# Patient Record
Sex: FEMALE | Race: WHITE | NOT HISPANIC OR LATINO | Employment: OTHER | URBAN - METROPOLITAN AREA
[De-identification: names, ages, dates, MRNs, and addresses within clinical notes are randomized per-mention and may not be internally consistent; named-entity substitution may affect disease eponyms.]

---

## 2019-09-11 ENCOUNTER — TRANSCRIBE ORDERS (OUTPATIENT)
Dept: ADMINISTRATIVE | Facility: HOSPITAL | Age: 74
End: 2019-09-11

## 2019-09-11 DIAGNOSIS — R94.39 ABNORMAL STRESS TEST: Primary | ICD-10-CM

## 2019-09-16 ENCOUNTER — HOSPITAL ENCOUNTER (OUTPATIENT)
Facility: HOSPITAL | Age: 74
Discharge: HOME | End: 2019-09-16
Attending: INTERNAL MEDICINE | Admitting: INTERNAL MEDICINE

## 2019-09-16 VITALS
BODY MASS INDEX: 30.55 KG/M2 | OXYGEN SATURATION: 92 % | RESPIRATION RATE: 16 BRPM | TEMPERATURE: 97.1 F | HEIGHT: 63 IN | WEIGHT: 172.4 LBS | HEART RATE: 75 BPM | SYSTOLIC BLOOD PRESSURE: 129 MMHG | DIASTOLIC BLOOD PRESSURE: 85 MMHG

## 2019-09-16 DIAGNOSIS — R94.39 ABNORMAL STRESS TEST: ICD-10-CM

## 2019-09-16 LAB
ANION GAP SERPL CALCULATED.3IONS-SCNC: 12 MMOL/L (ref 5–15)
BUN BLD-MCNC: 13 MG/DL (ref 8–23)
BUN/CREAT SERPL: 15.9 (ref 7–25)
CALCIUM SPEC-SCNC: 9.9 MG/DL (ref 8.6–10.5)
CHLORIDE SERPL-SCNC: 103 MMOL/L (ref 98–107)
CO2 SERPL-SCNC: 25 MMOL/L (ref 22–29)
CREAT BLD-MCNC: 0.82 MG/DL (ref 0.57–1)
DEPRECATED RDW RBC AUTO: 45.3 FL (ref 37–54)
ERYTHROCYTE [DISTWIDTH] IN BLOOD BY AUTOMATED COUNT: 13 % (ref 12.3–15.4)
GFR SERPL CREATININE-BSD FRML MDRD: 68 ML/MIN/1.73
GLUCOSE BLD-MCNC: 117 MG/DL (ref 65–99)
HCT VFR BLD AUTO: 40.8 % (ref 34–46.6)
HGB BLD-MCNC: 13.1 G/DL (ref 12–15.9)
MCH RBC QN AUTO: 31.2 PG (ref 26.6–33)
MCHC RBC AUTO-ENTMCNC: 32.1 G/DL (ref 31.5–35.7)
MCV RBC AUTO: 97.1 FL (ref 79–97)
PLATELET # BLD AUTO: 225 10*3/MM3 (ref 140–450)
PMV BLD AUTO: 11.8 FL (ref 6–12)
POTASSIUM BLD-SCNC: 4.3 MMOL/L (ref 3.5–5.2)
RBC # BLD AUTO: 4.2 10*6/MM3 (ref 3.77–5.28)
SODIUM BLD-SCNC: 140 MMOL/L (ref 136–145)
WBC NRBC COR # BLD: 8.51 10*3/MM3 (ref 3.4–10.8)

## 2019-09-16 PROCEDURE — C1769 GUIDE WIRE: HCPCS | Performed by: INTERNAL MEDICINE

## 2019-09-16 PROCEDURE — 80048 BASIC METABOLIC PNL TOTAL CA: CPT

## 2019-09-16 PROCEDURE — 0 IOPAMIDOL PER 1 ML: Performed by: INTERNAL MEDICINE

## 2019-09-16 PROCEDURE — 85027 COMPLETE CBC AUTOMATED: CPT

## 2019-09-16 PROCEDURE — 25010000002 HEPARIN (PORCINE) PER 1000 UNITS: Performed by: INTERNAL MEDICINE

## 2019-09-16 PROCEDURE — 25010000002 MIDAZOLAM PER 1 MG: Performed by: INTERNAL MEDICINE

## 2019-09-16 PROCEDURE — 85014 HEMATOCRIT: CPT

## 2019-09-16 PROCEDURE — A9270 NON-COVERED ITEM OR SERVICE: HCPCS | Performed by: PHYSICIAN ASSISTANT

## 2019-09-16 PROCEDURE — 93459 L HRT ART/GRFT ANGIO: CPT | Performed by: INTERNAL MEDICINE

## 2019-09-16 PROCEDURE — 80047 BASIC METABLC PNL IONIZED CA: CPT

## 2019-09-16 PROCEDURE — 36415 COLL VENOUS BLD VENIPUNCTURE: CPT

## 2019-09-16 PROCEDURE — C1894 INTRO/SHEATH, NON-LASER: HCPCS | Performed by: INTERNAL MEDICINE

## 2019-09-16 PROCEDURE — 63710000001 ASPIRIN EC 325 MG TABLET DELAYED-RELEASE: Performed by: PHYSICIAN ASSISTANT

## 2019-09-16 PROCEDURE — 25010000002 FENTANYL CITRATE (PF) 100 MCG/2ML SOLUTION: Performed by: INTERNAL MEDICINE

## 2019-09-16 RX ORDER — LIDOCAINE HYDROCHLORIDE 10 MG/ML
INJECTION, SOLUTION EPIDURAL; INFILTRATION; INTRACAUDAL; PERINEURAL AS NEEDED
Status: DISCONTINUED | OUTPATIENT
Start: 2019-09-16 | End: 2019-09-16 | Stop reason: HOSPADM

## 2019-09-16 RX ORDER — ACETAMINOPHEN 325 MG/1
650 TABLET ORAL EVERY 4 HOURS PRN
Status: DISCONTINUED | OUTPATIENT
Start: 2019-09-16 | End: 2019-09-16 | Stop reason: HOSPADM

## 2019-09-16 RX ORDER — SPIRONOLACTONE 25 MG/1
25 TABLET ORAL DAILY
Qty: 30 TABLET | Refills: 11 | Status: SHIPPED | OUTPATIENT
Start: 2019-09-16

## 2019-09-16 RX ORDER — ASPIRIN 325 MG
325 TABLET, DELAYED RELEASE (ENTERIC COATED) ORAL DAILY
Status: DISCONTINUED | OUTPATIENT
Start: 2019-09-16 | End: 2019-09-16 | Stop reason: HOSPADM

## 2019-09-16 RX ORDER — ALPRAZOLAM 0.25 MG/1
0.25 TABLET ORAL 3 TIMES DAILY PRN
Status: DISCONTINUED | OUTPATIENT
Start: 2019-09-16 | End: 2019-09-16 | Stop reason: HOSPADM

## 2019-09-16 RX ORDER — FENTANYL CITRATE 50 UG/ML
INJECTION, SOLUTION INTRAMUSCULAR; INTRAVENOUS AS NEEDED
Status: DISCONTINUED | OUTPATIENT
Start: 2019-09-16 | End: 2019-09-16 | Stop reason: HOSPADM

## 2019-09-16 RX ORDER — HYDROCODONE BITARTRATE AND ACETAMINOPHEN 5; 325 MG/1; MG/1
1 TABLET ORAL EVERY 4 HOURS PRN
Status: DISCONTINUED | OUTPATIENT
Start: 2019-09-16 | End: 2019-09-16 | Stop reason: HOSPADM

## 2019-09-16 RX ORDER — OXYBUTYNIN CHLORIDE 5 MG/1
5 TABLET ORAL DAILY
COMMUNITY
End: 2020-02-17

## 2019-09-16 RX ORDER — MORPHINE SULFATE 2 MG/ML
1 INJECTION, SOLUTION INTRAMUSCULAR; INTRAVENOUS EVERY 4 HOURS PRN
Status: DISCONTINUED | OUTPATIENT
Start: 2019-09-16 | End: 2019-09-16 | Stop reason: HOSPADM

## 2019-09-16 RX ORDER — CARVEDILOL 3.12 MG/1
3.12 TABLET ORAL 2 TIMES DAILY
Qty: 180 TABLET | Refills: 3 | Status: SHIPPED | OUTPATIENT
Start: 2019-09-16

## 2019-09-16 RX ORDER — MIDAZOLAM HYDROCHLORIDE 1 MG/ML
INJECTION INTRAMUSCULAR; INTRAVENOUS AS NEEDED
Status: DISCONTINUED | OUTPATIENT
Start: 2019-09-16 | End: 2019-09-16 | Stop reason: HOSPADM

## 2019-09-16 RX ORDER — NALOXONE HCL 0.4 MG/ML
0.4 VIAL (ML) INJECTION
Status: DISCONTINUED | OUTPATIENT
Start: 2019-09-16 | End: 2019-09-16 | Stop reason: HOSPADM

## 2019-09-16 RX ORDER — TEMAZEPAM 7.5 MG/1
7.5 CAPSULE ORAL NIGHTLY PRN
Status: DISCONTINUED | OUTPATIENT
Start: 2019-09-16 | End: 2019-09-16 | Stop reason: HOSPADM

## 2019-09-16 RX ORDER — SODIUM CHLORIDE 9 MG/ML
250 INJECTION, SOLUTION INTRAVENOUS CONTINUOUS
Status: ACTIVE | OUTPATIENT
Start: 2019-09-16 | End: 2019-09-16

## 2019-09-16 RX ADMIN — ASPIRIN 325 MG: 325 TABLET, DELAYED RELEASE ORAL at 14:22

## 2019-09-16 NOTE — H&P
Pre-Cardiac Catheterization Report  Cardiovascular Laboratory        Patient:  Alicia Young  :  1945  PCP:  Shamir Morales MD  PHONE:  [phone]    DATE: 2019    BRIEF HPI:  Alicia Young is a 73 y.o. female with hypertension, hypercholesterolemia, known coronary artery disease.  She is post previous three-vessel CABG from .  She is complaining of a one-month history of chest pain which she describes a tightness.  He states it is moderate severity lasting minutes with radiation to her jaw.  Associated symptoms include palpitations, dizziness, and fatigue.  Her symptoms increased with stress and are decreased with rest and nitroglycerin.  She recently underwent an abnormal stress test and now presents for left heart catheterization with possible intervention.    Cardiac Risk Factors:  advanced age (older than 55 for men, 65 for women), dyslipidemia, family history of premature cardiovascular disease, hypertension, obesity (BMI >= 30 kg/m2), sedentary lifestyle    Anginal class in last 2 weeks:  CCS class III    CHF Class in last 2 weeks:  NYHA Class II    Cardiogenic shock:  no    Cardiac arrest <24 hours:  no    Stress test within last 6 months:   yes   Details:    Previous cardiac catheterization:  yes  Details:     Previous CABG:  yes  Details:      Allergies:     IV contrast allergy:  no  No Known Allergies    MEDICATIONS:  Prior to Admission medications    Medication Sig Start Date End Date Taking? Authorizing Provider   aspirin 81 MG chewable tablet Chew 81 mg Daily.    Emergency, Nurse CRISTINO Barrios   FLUoxetine (PROzac) 20 MG capsule Take 20 mg by mouth Daily.    Emergency, Nurse CRISTINO Barrios   HYDROcodone-acetaminophen (NORCO) 5-325 MG per tablet Take 1 tablet by mouth Every 4 (Four) Hours As Needed for pain. 18   Prvaeen Flowers MD   losartan (COZAAR) 50 MG tablet Take 50 mg by mouth Daily.    Emergency, Nurse CRISTINO Barrios   pantoprazole (PROTONIX) 40 MG  EC tablet Take 40 mg by mouth Daily.    Emergency, Nurse Epic, RN       Past medical & surgical history, social and family history reviewed in the electronic medical record.    ROS:  Cardiovascular ROS: positive for - chest pain, dyspnea on exertion, palpitations and shortness of breath    Physical Exam:    Vitals: There were no vitals filed for this visit. There were no vitals filed for this visit.    General Appearance:    Alert, cooperative, in no acute distress   Head:    Normocephalic, without obvious abnormality, atraumatic   Eyes:            Lids and lashes normal, conjunctivae and sclerae normal, no   icterus, no pallor, corneas clear, PERRLA   Ears:    Ears appear intact with no abnormalities noted   Neck:   No adenopathy, supple, trachea midline, no thyromegaly, +   carotid bruits, no JVD   Back:     No kyphosis present, no scoliosis present, range of motion normal   Lungs:     Clear to auscultation,respirations regular, even and                  unlabored    Heart:    Regular rhythm and normal rate, normal S1 and S2, 2/6            murmur, no gallop, no rub, no click   Chest Wall:    No abnormalities observed   Abdomen:     Normal bowel sounds, no masses, no organomegaly, soft        non-tender, non-distended, no guarding, no rebound                tenderness   Rectal:     Deferred   Extremities:   Moves all extremities well, no edema, no cyanosis, no             redness   Pulses:   Pulses palpable and equal bilaterally   Skin:   No bleeding, bruising or rash   Neurologic:   Cranial nerves 2 - 12 grossly intact, sensation intact     Barbaeu Test:  Left: Normal  (oxymetric Allens) Right: Not Assessed             No results found for: CHLPL, TRIG, HDL, LDLDIRECT, AST, ALT        Impression      · Abnormal stress test    Plan     · Procedure to perform: [facility]  · Planned access: Left radial artery              BRIGITTE West  09/16/19  12:13 PM

## 2019-09-17 LAB
BUN BLDA-MCNC: 13 MG/DL (ref 8–26)
CA-I BLDA-SCNC: 1.34 MMOL/L (ref 1.2–1.32)
CHLORIDE BLDA-SCNC: 103 MMOL/L (ref 98–109)
CO2 BLDA-SCNC: 28 MMOL/L (ref 24–29)
CREAT BLDA-MCNC: 0.9 MG/DL (ref 0.6–1.3)
GLUCOSE BLDC GLUCOMTR-MCNC: 114 MG/DL (ref 70–130)
HCT VFR BLDA CALC: 37 % (ref 38–51)
HGB BLDA-MCNC: 12.6 G/DL (ref 12–17)
POTASSIUM BLDA-SCNC: 4.1 MMOL/L (ref 3.5–4.9)
SODIUM BLDA-SCNC: 141 MMOL/L (ref 138–146)

## 2019-09-19 ENCOUNTER — TRANSCRIBE ORDERS (OUTPATIENT)
Dept: CARDIAC REHAB | Facility: HOSPITAL | Age: 74
End: 2019-09-19

## 2019-09-19 ENCOUNTER — DOCUMENTATION (OUTPATIENT)
Dept: CARDIAC REHAB | Facility: HOSPITAL | Age: 74
End: 2019-09-19

## 2019-09-19 DIAGNOSIS — Z00.00 PREVENTATIVE HEALTH CARE: Primary | ICD-10-CM

## 2019-09-19 NOTE — PROGRESS NOTES
Pt.'s daughter called in regards to Phase III Cardiac Rehab. Staff discussed benefits of exercise, program protocol, and educational material provided. Teach back verified.  Patient scheduled for orientation at [facility] on Tuesday, October 8th at 0900. Teach back verified.

## 2019-10-01 ENCOUNTER — TREATMENT (OUTPATIENT)
Dept: CARDIAC REHAB | Facility: HOSPITAL | Age: 74
End: 2019-10-01

## 2019-10-01 DIAGNOSIS — Z00.00 PREVENTATIVE HEALTH CARE: Primary | ICD-10-CM

## 2019-10-01 NOTE — PROGRESS NOTES
2Cardiac Rehab Initial Assessment      Name: Alicia Young  :1945 Allergies:Lipitor [atorvastatin]   MRN: [id number] 73 y.o. Physician: Shamir Morales MD   Primary Diagnosis:    Diagnosis Plan   1. Preventative health care      Event Date: 2019 Specialist: Ronny Hardin MD   Secondary Diagnosis: CAD, HTN, HLP Risk Stratification:High Risk Note Author: Deborah Fuentes RN     Cardiovascular History: Previous CABS     EXERCISE AT HOME  no  N/A    EF:  %      Source:            Ambulatory Status:Independent  Ambulatory Fall Risk Assessed on Initial Visit: yes 6 Minute Walk Pre- Cardiac Rehab:  Distance: ft      RPE:  Max. HR:        SPO2:    MET:   MPH:              RPD:   Resting BP:  LA,  RA    Peak BP:   Recovery BP:   Comments:       NUTRITION  Lipids:yes If yes, labs as follows;  Total: No components found for: CHOLESTEROL 212  HDL: No results found for: HDL Lipids continued:  LDL:No results found for: LDL  Triglyceride: No components found for: TRIGLYCERIDE   Weight Management:                 Weight:   Height:                                    BMI: There is no height or weight on file to calculate BMI.  Waist Circumference:   inches   Alcohol Use:  Diabetes:No    Last HGBA1C with date if applicable:No components found for: A1C         SOCIAL HISTORY  Social History     Socioeconomic History   • Marital status:      Spouse name: Not on file   • Number of children: Not on file   • Years of education: Not on file   • Highest education level: Not on file   Tobacco Use   • Smoking status: Former Smoker     Last attempt to quit: 1988     Years since quittin.7   • Smokeless tobacco: Never Used   Substance and Sexual Activity   • Alcohol use: No   • Drug use: No   • Sexual activity: Defer       Educational Level (choose one that applies) . Learning Barriers:Ready to Learn    Family Support:yes    Living Arrangement: lives with their family    Risk Factors: Clinical Depression  Yes  and Hyperlipidemia  Yes     Tobacco Adjunct: N/A        Comorbidities:      PSYCHOSOCIAL  Clinical Depression: yes    Stress: no     Assess presence or absence of depression using a valid screening tool: yes      PHYSICAL ASSESSMENT  Influenza vaccine: yes  Pneumococcal vaccine: yes          Angina: no    Describe angina scale of 0 - 4: 0 = none    Today are you having incisional pain? N/A. If, Yes, Scale: .        Today are you having any other pain? N/A. If, Yes, Scale: .     Diagnosed with Hypertension:yes    Heart Sounds:  S1 S2     Lung Sounds: normal air entry, lungs clear to auscultation         Assessment:  Orthopedic Problems: knee    Are you being hurt, hit, or frightened by anyone at home or in your life? no    Are you being neglected by a caregiver? N/A Shoulder flexibility/Range of motion: Average     Recommended arm activity: Any    Chair sit and reach within: 0 inches   Leg flexibility: Average    Leg Strength/Balance/Five times sit to stand: 0 seconds.     Chose one: Average    Recommended stretching: Standing    Assessment:     Family attends IA: no Time of arrival: 1300  Time of departure: 1400     Patient Goals: To increase strength and to eat healthier.          10/1/2019  2:30 PM  Deborah Fuentes RN

## 2019-10-03 ENCOUNTER — TREATMENT (OUTPATIENT)
Dept: CARDIAC REHAB | Facility: HOSPITAL | Age: 74
End: 2019-10-03

## 2019-10-03 DIAGNOSIS — Z00.00 PREVENTATIVE HEALTH CARE: Primary | ICD-10-CM

## 2019-10-08 ENCOUNTER — TREATMENT (OUTPATIENT)
Dept: CARDIAC REHAB | Facility: HOSPITAL | Age: 74
End: 2019-10-08

## 2019-10-08 ENCOUNTER — APPOINTMENT (OUTPATIENT)
Dept: CARDIAC REHAB | Facility: HOSPITAL | Age: 74
End: 2019-10-08

## 2019-10-08 DIAGNOSIS — Z00.00 PREVENTATIVE HEALTH CARE: Primary | ICD-10-CM

## 2019-10-10 ENCOUNTER — TREATMENT (OUTPATIENT)
Dept: CARDIAC REHAB | Facility: HOSPITAL | Age: 74
End: 2019-10-10

## 2019-10-10 DIAGNOSIS — Z00.00 PREVENTATIVE HEALTH CARE: Primary | ICD-10-CM

## 2019-10-15 ENCOUNTER — TREATMENT (OUTPATIENT)
Dept: CARDIAC REHAB | Facility: HOSPITAL | Age: 74
End: 2019-10-15

## 2019-10-15 DIAGNOSIS — Z00.00 PREVENTATIVE HEALTH CARE: Primary | ICD-10-CM

## 2019-10-17 ENCOUNTER — TREATMENT (OUTPATIENT)
Dept: CARDIAC REHAB | Facility: HOSPITAL | Age: 74
End: 2019-10-17

## 2019-10-17 DIAGNOSIS — Z00.00 PREVENTATIVE HEALTH CARE: Primary | ICD-10-CM

## 2019-10-22 ENCOUNTER — TREATMENT (OUTPATIENT)
Dept: CARDIAC REHAB | Facility: HOSPITAL | Age: 74
End: 2019-10-22

## 2019-10-22 DIAGNOSIS — Z00.00 PREVENTATIVE HEALTH CARE: Primary | ICD-10-CM

## 2019-10-24 ENCOUNTER — TREATMENT (OUTPATIENT)
Dept: CARDIAC REHAB | Facility: HOSPITAL | Age: 74
End: 2019-10-24

## 2019-10-24 DIAGNOSIS — Z00.00 PREVENTATIVE HEALTH CARE: Primary | ICD-10-CM

## 2019-10-29 ENCOUNTER — TREATMENT (OUTPATIENT)
Dept: CARDIAC REHAB | Facility: HOSPITAL | Age: 74
End: 2019-10-29

## 2019-10-29 DIAGNOSIS — Z00.00 PREVENTATIVE HEALTH CARE: Primary | ICD-10-CM

## 2019-10-31 ENCOUNTER — TREATMENT (OUTPATIENT)
Dept: CARDIAC REHAB | Facility: HOSPITAL | Age: 74
End: 2019-10-31

## 2019-10-31 DIAGNOSIS — Z00.00 PREVENTATIVE HEALTH CARE: Primary | ICD-10-CM

## 2019-11-05 ENCOUNTER — TREATMENT (OUTPATIENT)
Dept: CARDIAC REHAB | Facility: HOSPITAL | Age: 74
End: 2019-11-05

## 2019-11-05 DIAGNOSIS — Z00.00 PREVENTATIVE HEALTH CARE: Primary | ICD-10-CM

## 2019-11-06 ENCOUNTER — TREATMENT (OUTPATIENT)
Dept: CARDIAC REHAB | Facility: HOSPITAL | Age: 74
End: 2019-11-06

## 2019-11-06 DIAGNOSIS — Z00.00 PREVENTATIVE HEALTH CARE: Primary | ICD-10-CM

## 2019-11-06 NOTE — PROGRESS NOTES
"CARDIAC/PULMONARY REHAB NUTRITION EDUCATION/ASSESSMENT      73 y.o.         Height: 63.5  in    Weight: 163  lb     BMI: 28.4 IBW:  117.5lb     %IBW: 139              Time seen: 11:00a-12:00pm   Dx: cath with stent. H/o CAD, GERD, CABG 2011, HF  Cardiac Risk Factors: HTN, HLP Weight Assessment: Obese, based on IBW>130%             Appetite: good   Taste/smell changes:  No Food records reviewed? Yes                Who does the patient live with: alone  Who does the cooking at home:  pt    Patient actively receiving lifestyle support from others at home? Yes; dtr lives near       Pertinent Lab Values:   Total: No components found for: CHOLESTEROL  HDL: No results found for: HDL  LDL:No results found for: LDL  Triglyceride: No components found for: TRIGLYCERIDE  Last HGBA1C with date if applicable:No components found for: A1C  Glucose:   Glucose   Date Value Ref Range Status   09/16/2019 117 (H) 65 - 99 mg/dL Final                      Assessment / Recommendations: Rec that pt follow 1400 calories, and 39-54g total fat/d and no more than 8g saturated fat/d plan. RD encouraged wt management and advised pt that target wt of 130lb would be more in ideal wt range; pt plans to work toward wt loss.  Pt states she has not been cooking as much since she has been living alone, but when she does cook, pt describes many foods she makes/eats as \"Casseroles\" made with creamed soups/sauces etc. RD advised pt of high Na content of many of the foods she is currently using, and suggested food/product alternatives which would support compliance with HF diet guidelines. RD explained rationale for HF diet, and potential consequences of noncompliance. Pt expresses some verbal reluctance to making many of changes suggested by RD, and pt with someone negative attitude towards change at this point. RD assesses pt is pre-contemplation to contemplation stage of change; Pt with approp questions while she considers how/whether she may best move " "toward compliance. Pt able to grasp concepts taught. Pt states she has been taking metameucil recently, which she perceives helps minimize sx of GERD. Pt currently using many canned foods; RD recs [place] versions of canned vegetables. RD encouraged pt to increase fruit/vegetable consumption daily, and rec change from using butter to a vegetable-based spread. Pt states she \"loves salt.\" RD will continue edu with pt at grocery store tour in 2 weeks.  Motivation level toward diet compliance: strong       Education:    Instructed on:  Cardiac/mediterranean diet  Weight management  Lipid management  HF diet  7695-7907 mg/day Na restriction  Label reading for heart health  Written materials given:  yes         Plan: RD avail for f/u prn. Pt plans to attend grocery store tour with RD in November @ [employer].                 1:04 PM  11/6/2019  Yakelin Collins, MS,RD,LD                        "

## 2019-11-11 ENCOUNTER — TREATMENT (OUTPATIENT)
Dept: CARDIAC REHAB | Facility: HOSPITAL | Age: 74
End: 2019-11-11

## 2019-11-11 ENCOUNTER — OFFICE VISIT (OUTPATIENT)
Dept: ORTHOPEDIC SURGERY | Facility: CLINIC | Age: 74
End: 2019-11-11

## 2019-11-11 VITALS — OXYGEN SATURATION: 98 % | HEART RATE: 65 BPM | WEIGHT: 166 LBS | HEIGHT: 63 IN | BODY MASS INDEX: 29.41 KG/M2

## 2019-11-11 DIAGNOSIS — Z00.00 PREVENTATIVE HEALTH CARE: Primary | ICD-10-CM

## 2019-11-11 DIAGNOSIS — M17.12 PRIMARY OSTEOARTHRITIS OF LEFT KNEE: Primary | ICD-10-CM

## 2019-11-11 PROCEDURE — 99203 OFFICE O/P NEW LOW 30 MIN: CPT | Performed by: ORTHOPAEDIC SURGERY

## 2019-11-11 RX ORDER — PHENOL 1.4 %
600 AEROSOL, SPRAY (ML) MUCOUS MEMBRANE DAILY
COMMUNITY
End: 2020-02-17

## 2019-11-11 RX ORDER — MELOXICAM 7.5 MG/1
7.5 TABLET ORAL DAILY
COMMUNITY
End: 2020-05-04

## 2019-11-11 RX ORDER — ALPRAZOLAM 0.5 MG/1
0.5 TABLET ORAL 2 TIMES DAILY PRN
COMMUNITY
End: 2020-02-17

## 2019-11-11 NOTE — PROGRESS NOTES
[facility] [place] [facility] Note    Subjective     Chief Complaint   Patient presents with   • Left Knee - Pain        HPI    Alicia Young is a 74 y.o. female.  She presents today for evaluation of left knee pain.  Pain is been present for over 20 years, following no injury.  The pain is associated with swelling, popping, stiffness and giving way.  It worsens with walking, standing and climbing stairs.  The pain is 2 out of 10 today, but she had a steroid injection last week with Dr. Real.      Patient Active Problem List   Diagnosis   • Abnormal stress test     Past Medical History:   Diagnosis Date   • Arthritis    • Depression    • GERD (gastroesophageal reflux disease)    • Hyperlipidemia    • Hypertension    • Macular degeneration       Past Surgical History:   Procedure Laterality Date   • CARDIAC CATHETERIZATION     • CARDIAC CATHETERIZATION N/A 2019    Procedure: Left Heart Cath (61269);  Surgeon: Ronny Hardin MD;  Location: [facility] CATH INVASIVE LOCATION;  Service: Cardiovascular   • CHOLECYSTECTOMY     • CORONARY ARTERY BYPASS GRAFT        History reviewed. No pertinent family history.  Social History     Socioeconomic History   • Marital status:      Spouse name: Not on file   • Number of children: Not on file   • Years of education: Not on file   • Highest education level: Not on file   Tobacco Use   • Smoking status: Former Smoker     Last attempt to quit: 1988     Years since quittin.8   • Smokeless tobacco: Never Used   Substance and Sexual Activity   • Alcohol use: No   • Drug use: No   • Sexual activity: Defer      Current Outpatient Medications on File Prior to Visit   Medication Sig Dispense Refill   • ALPRAZolam (XANAX) 0.5 MG tablet Take 0.5 mg by mouth 2 (Two) Times a Day As Needed for Anxiety.     • aspirin 81 MG chewable tablet Chew 81 mg Daily.     • calcium carbonate (CALCIUM 600) 600 MG tablet Take 600 mg by mouth Daily.     • carvedilol (COREG)  "3.125 MG tablet Take 1 tablet by mouth 2 (Two) Times a Day. 180 tablet 3   • Evolocumab (REPATHA SC) Inject  under the skin into the appropriate area as directed.     • FLUoxetine (PROzac) 20 MG capsule Take 20 mg by mouth Daily.     • meloxicam (MOBIC) 7.5 MG tablet Take 7.5 mg by mouth Daily.     • Multiple Vitamins-Minerals (MULTIVITAMIN ADULT PO) Take  by mouth.     • Multiple Vitamins-Minerals (PRESERVISION AREDS 2 PO) Take  by mouth 2 (Two) Times a Day.     • NITROGLYCERIN PATCH      • oxybutynin (DITROPAN) 5 MG tablet Take 5 mg by mouth Daily.     • pantoprazole (PROTONIX) 40 MG EC tablet Take 40 mg by mouth Daily.     • sacubitril-valsartan (ENTRESTO) 24-26 MG tablet Take 1 tablet by mouth 2 (Two) Times a Day. 60 tablet 5   • spironolactone (ALDACTONE) 25 MG tablet Take 1 tablet by mouth Daily. 30 tablet 11     No current facility-administered medications on file prior to visit.       Allergies   Allergen Reactions   • Lipitor [Atorvastatin] Myalgia   • Ezetimibe Myalgia   • Statins Myalgia        Review of Systems   Constitutional: Positive for activity change and fatigue.   HENT: Positive for hearing loss, postnasal drip, rhinorrhea and sinus pressure.    Eyes: Positive for visual disturbance.   Respiratory: Negative.    Cardiovascular: Positive for chest pain.   Gastrointestinal: Positive for abdominal distention.   Endocrine: Negative.    Genitourinary: Positive for urgency.   Musculoskeletal: Positive for arthralgias, joint swelling, neck pain and neck stiffness.   Skin: Negative.    Allergic/Immunologic: Negative.    Neurological: Positive for dizziness.   Hematological: Negative.    Psychiatric/Behavioral: Positive for agitation, decreased concentration and sleep disturbance.        Objective      Physical Exam  Pulse 65   Ht 160 cm (62.99\")   Wt 75.3 kg (166 lb)   SpO2 98%   BMI 29.41 kg/m²     Body mass index is 29.41 kg/m².    General:   Mental Status:  Alert   Appearance: Cooperative, in no " acute distress   Build and Nutrition: Well-nourished well-developed female   Orientation: Alert and oriented to person, place and time   Posture: Normal   Gait: Normal    Integument:   Left knee: No skin lesions, no rash, no ecchymosis    Neurologic:   Sensation:    Left foot: Intact to light touch on the dorsal and plantar aspect   Motor:  Left lower extremity: 5/5 quadriceps, hamstrings, ankle dorsiflexors, and ankle plantar flexors  Vascular:   Left lower extremity: 2+ dorsalis pedis pulse, prompt capillary refill    Lower Extremities:   Left Knee:    Tenderness:  None    Effusion:  None    Swelling:  None    Crepitus:  Positive    Atrophy:  None    Range of motion:  Extension: 0°       Flexion: 130°  Instability:  No varus laxity, no valgus laxity, negative anterior drawer  Deformities:  None      Imaging/Studies      Imaging Results (Last 24 Hours)     Procedure Component Value Units Date/Time    XR Knee 4+ View Left [[id number]] Resulted:  11/11/19 1432     Updated:  11/11/19 1432    Narrative:       Left Knee Radiographs  Indication: left knee pain  Views: Standing AP's and skiers of both knees, with lateral and sunrise   views of the left knee    Comparison: no prior studies available    Findings:   Bone-on-bone contact the medial compartment, patellofemoral degeneration   primarily on the medial facet, varus alignment, and diffuse osteopenia.    Impression: Left knee osteoarthritis.          Assessment and Plan     Alicia was seen today for pain.    Diagnoses and all orders for this visit:    Primary osteoarthritis of left knee  -     XR Knee 4+ View Left        1. Primary osteoarthritis of left knee        I reviewed my findings with the patient today.  She does have left knee arthritis, and she just had an injection last week with Dr. Real.  She was interested in possible Visco supplementation injections.  Knee is doing well today, and I will see her back in 3 months, when she returns from [place].   We may consider injections at that time.  Long-term, she may be a candidate for knee replacement surgery depending on her symptoms.    Return in about 3 months (around 2/11/2020).      Medical Decision Making  Data/Risk: radiology tests and independent visualization of imaging, lab tests, or EMG/NCV      Heladio Arroyo MD  11/11/19  2:51 PM

## 2019-11-14 ENCOUNTER — TREATMENT (OUTPATIENT)
Dept: CARDIAC REHAB | Facility: HOSPITAL | Age: 74
End: 2019-11-14

## 2019-11-14 DIAGNOSIS — Z00.00 PREVENTATIVE HEALTH CARE: Primary | ICD-10-CM

## 2019-11-19 ENCOUNTER — TREATMENT (OUTPATIENT)
Dept: CARDIAC REHAB | Facility: HOSPITAL | Age: 74
End: 2019-11-19

## 2019-11-19 DIAGNOSIS — Z00.00 PREVENTATIVE HEALTH CARE: Primary | ICD-10-CM

## 2019-11-21 ENCOUNTER — TREATMENT (OUTPATIENT)
Dept: CARDIAC REHAB | Facility: HOSPITAL | Age: 74
End: 2019-11-21

## 2019-11-21 DIAGNOSIS — Z00.00 PREVENTATIVE HEALTH CARE: Primary | ICD-10-CM

## 2019-11-25 ENCOUNTER — TREATMENT (OUTPATIENT)
Dept: CARDIAC REHAB | Facility: HOSPITAL | Age: 74
End: 2019-11-25

## 2019-11-25 DIAGNOSIS — Z00.00 PREVENTATIVE HEALTH CARE: Primary | ICD-10-CM

## 2019-11-26 ENCOUNTER — TREATMENT (OUTPATIENT)
Dept: CARDIAC REHAB | Facility: HOSPITAL | Age: 74
End: 2019-11-26

## 2019-11-26 DIAGNOSIS — Z00.00 PREVENTATIVE HEALTH CARE: Primary | ICD-10-CM

## 2019-12-05 ENCOUNTER — APPOINTMENT (OUTPATIENT)
Dept: GENERAL RADIOLOGY | Facility: HOSPITAL | Age: 74
End: 2019-12-05

## 2019-12-05 ENCOUNTER — TREATMENT (OUTPATIENT)
Dept: CARDIAC REHAB | Facility: HOSPITAL | Age: 74
End: 2019-12-05

## 2019-12-05 ENCOUNTER — HOSPITAL ENCOUNTER (EMERGENCY)
Facility: HOSPITAL | Age: 74
Discharge: HOME | End: 2019-12-05
Attending: EMERGENCY MEDICINE | Admitting: EMERGENCY MEDICINE

## 2019-12-05 VITALS
OXYGEN SATURATION: 96 % | TEMPERATURE: 97.9 F | DIASTOLIC BLOOD PRESSURE: 70 MMHG | HEART RATE: 64 BPM | RESPIRATION RATE: 18 BRPM | WEIGHT: 160 LBS | SYSTOLIC BLOOD PRESSURE: 140 MMHG | BODY MASS INDEX: 28.35 KG/M2 | HEIGHT: 63 IN

## 2019-12-05 DIAGNOSIS — R53.1 GENERALIZED WEAKNESS: ICD-10-CM

## 2019-12-05 DIAGNOSIS — Z00.00 PREVENTATIVE HEALTH CARE: Primary | ICD-10-CM

## 2019-12-05 DIAGNOSIS — I95.2 HYPOTENSION DUE TO MEDICATION: Primary | ICD-10-CM

## 2019-12-05 LAB
ALBUMIN SERPL-MCNC: 4.1 G/DL (ref 3.5–5.2)
ALBUMIN/GLOB SERPL: 1.5 G/DL
ALP SERPL-CCNC: 72 U/L (ref 39–117)
ALT SERPL W P-5'-P-CCNC: 14 U/L (ref 1–33)
ANION GAP SERPL CALCULATED.3IONS-SCNC: 11 MMOL/L (ref 5–15)
AST SERPL-CCNC: 22 U/L (ref 1–32)
BASOPHILS # BLD AUTO: 0.03 10*3/MM3 (ref 0–0.2)
BASOPHILS NFR BLD AUTO: 0.5 % (ref 0–1.5)
BILIRUB SERPL-MCNC: 0.4 MG/DL (ref 0.2–1.2)
BILIRUB UR QL STRIP: NEGATIVE
BUN BLD-MCNC: 9 MG/DL (ref 8–23)
BUN/CREAT SERPL: 11.3 (ref 7–25)
CALCIUM SPEC-SCNC: 9.9 MG/DL (ref 8.6–10.5)
CHLORIDE SERPL-SCNC: 103 MMOL/L (ref 98–107)
CLARITY UR: CLEAR
CO2 SERPL-SCNC: 23 MMOL/L (ref 22–29)
COLOR UR: YELLOW
CREAT BLD-MCNC: 0.8 MG/DL (ref 0.57–1)
DEPRECATED RDW RBC AUTO: 46.1 FL (ref 37–54)
EOSINOPHIL # BLD AUTO: 0.35 10*3/MM3 (ref 0–0.4)
EOSINOPHIL NFR BLD AUTO: 5.6 % (ref 0.3–6.2)
ERYTHROCYTE [DISTWIDTH] IN BLOOD BY AUTOMATED COUNT: 12.9 % (ref 12.3–15.4)
GFR SERPL CREATININE-BSD FRML MDRD: 70 ML/MIN/1.73
GLOBULIN UR ELPH-MCNC: 2.8 GM/DL
GLUCOSE BLD-MCNC: 124 MG/DL (ref 65–99)
GLUCOSE UR STRIP-MCNC: NEGATIVE MG/DL
HCT VFR BLD AUTO: 41.1 % (ref 34–46.6)
HGB BLD-MCNC: 13.5 G/DL (ref 12–15.9)
HGB UR QL STRIP.AUTO: NEGATIVE
HOLD SPECIMEN: NORMAL
HOLD SPECIMEN: NORMAL
IMM GRANULOCYTES # BLD AUTO: 0.02 10*3/MM3 (ref 0–0.05)
IMM GRANULOCYTES NFR BLD AUTO: 0.3 % (ref 0–0.5)
KETONES UR QL STRIP: NEGATIVE
LEUKOCYTE ESTERASE UR QL STRIP.AUTO: NEGATIVE
LYMPHOCYTES # BLD AUTO: 1.78 10*3/MM3 (ref 0.7–3.1)
LYMPHOCYTES NFR BLD AUTO: 28.5 % (ref 19.6–45.3)
MAGNESIUM SERPL-MCNC: 2.1 MG/DL (ref 1.6–2.4)
MCH RBC QN AUTO: 32.2 PG (ref 26.6–33)
MCHC RBC AUTO-ENTMCNC: 32.8 G/DL (ref 31.5–35.7)
MCV RBC AUTO: 98.1 FL (ref 79–97)
MONOCYTES # BLD AUTO: 0.79 10*3/MM3 (ref 0.1–0.9)
MONOCYTES NFR BLD AUTO: 12.6 % (ref 5–12)
NEUTROPHILS # BLD AUTO: 3.28 10*3/MM3 (ref 1.7–7)
NEUTROPHILS NFR BLD AUTO: 52.5 % (ref 42.7–76)
NITRITE UR QL STRIP: NEGATIVE
NRBC BLD AUTO-RTO: 0 /100 WBC (ref 0–0.2)
PH UR STRIP.AUTO: 7 [PH] (ref 5–8)
PLATELET # BLD AUTO: 203 10*3/MM3 (ref 140–450)
PMV BLD AUTO: 10.9 FL (ref 6–12)
POTASSIUM BLD-SCNC: 4.2 MMOL/L (ref 3.5–5.2)
PROT SERPL-MCNC: 6.9 G/DL (ref 6–8.5)
PROT UR QL STRIP: NEGATIVE
RBC # BLD AUTO: 4.19 10*6/MM3 (ref 3.77–5.28)
SODIUM BLD-SCNC: 137 MMOL/L (ref 136–145)
SP GR UR STRIP: 1.01 (ref 1–1.03)
TROPONIN T SERPL-MCNC: <0.01 NG/ML (ref 0–0.03)
UROBILINOGEN UR QL STRIP: NORMAL
WBC NRBC COR # BLD: 6.25 10*3/MM3 (ref 3.4–10.8)
WHOLE BLOOD HOLD SPECIMEN: NORMAL
WHOLE BLOOD HOLD SPECIMEN: NORMAL

## 2019-12-05 PROCEDURE — 84484 ASSAY OF TROPONIN QUANT: CPT

## 2019-12-05 PROCEDURE — 81003 URINALYSIS AUTO W/O SCOPE: CPT

## 2019-12-05 PROCEDURE — 80053 COMPREHEN METABOLIC PANEL: CPT

## 2019-12-05 PROCEDURE — 83735 ASSAY OF MAGNESIUM: CPT

## 2019-12-05 PROCEDURE — 93005 ELECTROCARDIOGRAM TRACING: CPT | Performed by: EMERGENCY MEDICINE

## 2019-12-05 PROCEDURE — 71045 X-RAY EXAM CHEST 1 VIEW: CPT

## 2019-12-05 PROCEDURE — 85025 COMPLETE CBC W/AUTO DIFF WBC: CPT

## 2019-12-05 PROCEDURE — 99284 EMERGENCY DEPT VISIT MOD MDM: CPT

## 2019-12-05 PROCEDURE — 93005 ELECTROCARDIOGRAM TRACING: CPT

## 2019-12-05 RX ORDER — SODIUM CHLORIDE 0.9 % (FLUSH) 0.9 %
10 SYRINGE (ML) INJECTION AS NEEDED
Status: DISCONTINUED | OUTPATIENT
Start: 2019-12-05 | End: 2019-12-06 | Stop reason: HOSPADM

## 2019-12-06 NOTE — DISCHARGE INSTRUCTIONS
Discontinue carvedilol per Dr. Zee who is on-call for Dr. Hardin.    If you feel weak, lightheaded or otherwise feel poor please lie down and elevate your legs.  Check your blood pressure.    Please check your blood pressure 3 times a day. Keep a chart of these readings and any correlation to symptoms you might be having and bring this chart to the follow up with your primary care physician. If you don't already have a good blood pressure cuff, I recommend the following:    [employer] - LotFancy Blood Pressure Monitor (for upper arm)    or similar upper arm blood pressure cuffs which can be purchased for around $25.

## 2019-12-06 NOTE — ED PROVIDER NOTES
Subjective   Alicia Young is a 74 y.o. female who presents to the ED with c/o generalized weakness. The patient had a cardiac catheterization performed on 09/11/2019 performed by Dr. Hardin, cardiology. She was prescribed 3.125 mg of Carvedilol twice daily and 24-26 mg tablet of Entresto twice daily since 09/11/2019. Since then her blood pressure was running low in the 80's and 90's systolic but improved at some point then her blood pressure dropped again. 2 days ago the patient had a colonoscopy with normal results but after the procedure her blood pressure dropped to 67/37. At 1630 today she was performing normal activities when she felt weak, dizzy, and fatigued. The patient checked her blood pressure at this time and it was 75/56. She checked it again at 1700 and it was 99/56 but her heart rate dropped, prompting her to present to the ED. The patient reports having a normal intake of fluids. She has not taken her nightly dosages of Carvedilol or Entresto today. She has a past medical history of hypertension, hyperlipidemia, and GERD. Her surgical history includes CABG. There are no other acute complaints at this time.        History provided by:  Patient and relative  Weakness - Generalized   Severity:  Moderate  Onset quality:  Sudden  Duration:  4 hours  Timing:  Constant  Progression:  Improving  Chronicity:  Recurrent  Relieved by:  None tried  Worsened by:  Nothing  Ineffective treatments:  None tried  Associated symptoms: dizziness    Associated symptoms: no anorexia, no numbness in extremities, no falls, no loss of consciousness, no syncope and no vomiting    Risk factors: coronary artery disease and new medications    Risk factors: no congestive heart failure, no diabetes, no excessive menstruation and no family hx of stroke        Review of Systems   Constitutional: Positive for fatigue.        Patient has had normal fluid intake.   Cardiovascular: Negative for syncope.        The patient has had  lower blood pressure readings.   Gastrointestinal: Negative for anorexia and vomiting.   Musculoskeletal: Negative for falls.   Neurological: Positive for dizziness and weakness. Negative for loss of consciousness.   All other systems reviewed and are negative.      Past Medical History:   Diagnosis Date   • Arthritis    • Depression    • GERD (gastroesophageal reflux disease)    • Hyperlipidemia    • Hypertension    • Macular degeneration        Allergies   Allergen Reactions   • Lipitor [Atorvastatin] Myalgia   • Ezetimibe Myalgia   • Statins Myalgia       Past Surgical History:   Procedure Laterality Date   • CARDIAC CATHETERIZATION     • CARDIAC CATHETERIZATION N/A 2019    Procedure: Left Heart Cath (12300);  Surgeon: Ronny Hardin MD;  [facility];  Service: Cardiovascular   • CHOLECYSTECTOMY     • CORONARY ARTERY BYPASS GRAFT         No family history on file.    Social History     Socioeconomic History   • Marital status:      Spouse name: Not on file   • Number of children: Not on file   • Years of education: Not on file   • Highest education level: Not on file   Tobacco Use   • Smoking status: Former Smoker     Last attempt to quit: 1988     Years since quittin.9   • Smokeless tobacco: Never Used   Substance and Sexual Activity   • Alcohol use: No   • Drug use: No   • Sexual activity: Defer         Objective   Physical Exam   Constitutional: She is oriented to person, place, and time. She appears well-developed and well-nourished. No distress.   The patient is a pleasant female in no acute distress.   HENT:   Head: Normocephalic and atraumatic.   Eyes: Conjunctivae are normal. No scleral icterus.   Neck: Normal range of motion. Neck supple.   Cardiovascular: Normal rate, regular rhythm and normal heart sounds.   No murmur heard.  Pulmonary/Chest: Effort normal and breath sounds normal. No respiratory distress.   Abdominal: Soft. There is no  tenderness.   Musculoskeletal: Normal range of motion. She exhibits no edema.   Neurological: She is alert and oriented to person, place, and time.   Skin: Skin is warm and dry.   Psychiatric: She has a normal mood and affect. Her behavior is normal.   Nursing note and vitals reviewed.      Procedures         ED Course  ED Course as of Dec 05 2314   Thu Dec 05, 2019   2213 Dr. Callahan has paged Dr. Zee, cardiology, who is on-call for Dr. Hardin, cardiology.  [BS]   2220 Dr. Callahan has discussed the case in detail with Dr. Zee. The patient's medications were discussed in detail and Dr. Zee recommends stopping her Carvedilol medication and to schedule an office follow up.  [BS]      ED Course User Index  [BS] Mukesh Law       Recent Results (from the past 24 hour(s))   Comprehensive Metabolic Panel    Collection Time: 12/05/19  7:21 PM   Result Value Ref Range    Glucose 124 (H) 65 - 99 mg/dL    BUN 9 8 - 23 mg/dL    Creatinine 0.80 0.57 - 1.00 mg/dL    Sodium 137 136 - 145 mmol/L    Potassium 4.2 3.5 - 5.2 mmol/L    Chloride 103 98 - 107 mmol/L    CO2 23.0 22.0 - 29.0 mmol/L    Calcium 9.9 8.6 - 10.5 mg/dL    Total Protein 6.9 6.0 - 8.5 g/dL    Albumin 4.10 3.50 - 5.20 g/dL    ALT (SGPT) 14 1 - 33 U/L    AST (SGOT) 22 1 - 32 U/L    Alkaline Phosphatase 72 39 - 117 U/L    Total Bilirubin 0.4 0.2 - 1.2 mg/dL    eGFR Non African Amer 70 >60 mL/min/1.73    Globulin 2.8 gm/dL    A/G Ratio 1.5 g/dL    BUN/Creatinine Ratio 11.3 7.0 - 25.0    Anion Gap 11.0 5.0 - 15.0 mmol/L   Troponin    Collection Time: 12/05/19  7:21 PM   Result Value Ref Range    Troponin T <0.010 0.000 - 0.030 ng/mL   Magnesium    Collection Time: 12/05/19  7:21 PM   Result Value Ref Range    Magnesium 2.1 1.6 - 2.4 mg/dL   Light Blue Top    Collection Time: 12/05/19  7:21 PM   Result Value Ref Range    Extra Tube hold for add-on    Green Top (Gel)    Collection Time: 12/05/19  7:21 PM   Result Value Ref Range    Extra Tube Hold for  add-ons.    Lavender Top    Collection Time: 12/05/19  7:21 PM   Result Value Ref Range    Extra Tube hold for add-on    Gold Top - SST    Collection Time: 12/05/19  7:21 PM   Result Value Ref Range    Extra Tube Hold for add-ons.    CBC Auto Differential    Collection Time: 12/05/19  7:21 PM   Result Value Ref Range    WBC 6.25 3.40 - 10.80 10*3/mm3    RBC 4.19 3.77 - 5.28 10*6/mm3    Hemoglobin 13.5 12.0 - 15.9 g/dL    Hematocrit 41.1 34.0 - 46.6 %    MCV 98.1 (H) 79.0 - 97.0 fL    MCH 32.2 26.6 - 33.0 pg    MCHC 32.8 31.5 - 35.7 g/dL    RDW 12.9 12.3 - 15.4 %    RDW-SD 46.1 37.0 - 54.0 fl    MPV 10.9 6.0 - 12.0 fL    Platelets 203 140 - 450 10*3/mm3    Neutrophil % 52.5 42.7 - 76.0 %    Lymphocyte % 28.5 19.6 - 45.3 %    Monocyte % 12.6 (H) 5.0 - 12.0 %    Eosinophil % 5.6 0.3 - 6.2 %    Basophil % 0.5 0.0 - 1.5 %    Immature Grans % 0.3 0.0 - 0.5 %    Neutrophils, Absolute 3.28 1.70 - 7.00 10*3/mm3    Lymphocytes, Absolute 1.78 0.70 - 3.10 10*3/mm3    Monocytes, Absolute 0.79 0.10 - 0.90 10*3/mm3    Eosinophils, Absolute 0.35 0.00 - 0.40 10*3/mm3    Basophils, Absolute 0.03 0.00 - 0.20 10*3/mm3    Immature Grans, Absolute 0.02 0.00 - 0.05 10*3/mm3    nRBC 0.0 0.0 - 0.2 /100 WBC   Urinalysis With Microscopic If Indicated (No Culture) - Urine, Clean Catch    Collection Time: 12/05/19  7:37 PM   Result Value Ref Range    Color, UA Yellow Yellow, Straw    Appearance, UA Clear Clear    pH, UA 7.0 5.0 - 8.0    Specific Gravity, UA 1.006 1.001 - 1.030    Glucose, UA Negative Negative    Ketones, UA Negative Negative    Bilirubin, UA Negative Negative    Blood, UA Negative Negative    Protein, UA Negative Negative    Leuk Esterase, UA Negative Negative    Nitrite, UA Negative Negative    Urobilinogen, UA 0.2 E.U./dL 0.2 - 1.0 E.U./dL     Note: In addition to lab results from this visit, the labs listed above may include labs taken at another facility or during a different encounter within the last 24 hours. Please  correlate lab times with ED admission and discharge times for further clarification of the services performed during this visit.    XR Chest 1 View   Final Result   No acute cardiopulmonary findings.      Signer Name: Torey Kim MD    Signed: 12/5/2019 9:32 PM    Workstation Name: [id number]     [facility]        Vitals:    12/05/19 2145 12/05/19 2200 12/05/19 2215 12/05/19 2236   BP: 146/64 126/75 144/71 140/70   BP Location:       Patient Position:    Sitting   Pulse: 68 67 64 64   Resp:    18   Temp:    97.9 °F (36.6 °C)   TempSrc:    Oral   SpO2: 99% 98% 94% 96%   Weight:       Height:         Medications   sodium chloride 0.9 % flush 10 mL (not administered)     ECG/EMG Results (last 24 hours)     Procedure Component Value Units Date/Time    ECG 12 Lead [[id number]] Collected:  12/05/19 1936     Updated:  12/05/19 2057    Narrative:       Test Reason : Weak/Dizzy/AMS protocol  Blood Pressure : **/** mmHG  Vent. Rate : 071 BPM     Atrial Rate : 071 BPM     P-R Int : 148 ms          QRS Dur : 094 ms      QT Int : 442 ms       P-R-T Axes : 043 043 131 degrees     QTc Int : 480 ms    Sinus rhythm with premature supraventricular complexes and with frequent   premature ventricular complexes  Possible Left atrial enlargement  Inferior infarct , age undetermined  ST & T wave abnormality, consider lateral ischemia  Abnormal ECG  No previous ECGs available  Confirmed by MARK JAIMES MD ([id number]) on 12/5/2019 8:57:49 PM    Referred By:  EDMD           Confirmed By:MARK JAIMES MD        ECG 12 Lead   Final Result   Test Reason : Weak/Dizzy/AMS protocol   Blood Pressure : **/** mmHG   Vent. Rate : 071 BPM     Atrial Rate : 071 BPM      P-R Int : 148 ms          QRS Dur : 094 ms       QT Int : 442 ms       P-R-T Axes : 043 043 131 degrees      QTc Int : 480 ms      Sinus rhythm with premature supraventricular complexes and with frequent    premature ventricular complexes   Possible Left atrial  enlargement   Inferior infarct , age undetermined   ST & T wave abnormality, consider lateral ischemia   Abnormal ECG   No previous ECGs available   Confirmed by MARK CALLAHAN MD ([id number]) on 12/5/2019 8:57:49 PM      Referred By:  EDMD           Confirmed By:MARK CALLAHAN MD                        [facility]    Final diagnoses:   Hypotension due to medication   Generalized weakness       Documentation assistance provided by violette Law.  Information recorded by the scribe was done at my direction and has been verified and validated by me.     Mukesh Law  12/05/19 1262       Mark Callahan MD  12/08/19 9579

## 2019-12-09 ENCOUNTER — TREATMENT (OUTPATIENT)
Dept: CARDIAC REHAB | Facility: HOSPITAL | Age: 74
End: 2019-12-09

## 2019-12-09 DIAGNOSIS — Z00.00 PREVENTATIVE HEALTH CARE: Primary | ICD-10-CM

## 2019-12-13 ENCOUNTER — TREATMENT (OUTPATIENT)
Dept: CARDIAC REHAB | Facility: HOSPITAL | Age: 74
End: 2019-12-13

## 2019-12-13 DIAGNOSIS — Z00.00 PREVENTATIVE HEALTH CARE: Primary | ICD-10-CM

## 2019-12-18 ENCOUNTER — TREATMENT (OUTPATIENT)
Dept: CARDIAC REHAB | Facility: HOSPITAL | Age: 74
End: 2019-12-18

## 2019-12-18 DIAGNOSIS — Z00.00 PREVENTATIVE HEALTH CARE: Primary | ICD-10-CM

## 2019-12-20 ENCOUNTER — TREATMENT (OUTPATIENT)
Dept: CARDIAC REHAB | Facility: HOSPITAL | Age: 74
End: 2019-12-20

## 2019-12-20 DIAGNOSIS — Z00.00 PREVENTATIVE HEALTH CARE: Primary | ICD-10-CM

## 2019-12-26 ENCOUNTER — TREATMENT (OUTPATIENT)
Dept: CARDIAC REHAB | Facility: HOSPITAL | Age: 74
End: 2019-12-26

## 2019-12-26 DIAGNOSIS — Z00.00 PREVENTATIVE HEALTH CARE: Primary | ICD-10-CM

## 2019-12-27 ENCOUNTER — TREATMENT (OUTPATIENT)
Dept: CARDIAC REHAB | Facility: HOSPITAL | Age: 74
End: 2019-12-27

## 2019-12-27 DIAGNOSIS — Z00.00 PREVENTATIVE HEALTH CARE: Primary | ICD-10-CM

## 2019-12-30 ENCOUNTER — TREATMENT (OUTPATIENT)
Dept: CARDIAC REHAB | Facility: HOSPITAL | Age: 74
End: 2019-12-30

## 2019-12-30 DIAGNOSIS — Z00.00 PREVENTATIVE HEALTH CARE: Primary | ICD-10-CM

## 2020-01-08 ENCOUNTER — TREATMENT (OUTPATIENT)
Dept: CARDIAC REHAB | Facility: HOSPITAL | Age: 75
End: 2020-01-08

## 2020-01-08 DIAGNOSIS — Z00.00 PREVENTATIVE HEALTH CARE: Primary | ICD-10-CM

## 2020-01-10 ENCOUNTER — TREATMENT (OUTPATIENT)
Dept: CARDIAC REHAB | Facility: HOSPITAL | Age: 75
End: 2020-01-10

## 2020-01-10 DIAGNOSIS — Z00.00 PREVENTATIVE HEALTH CARE: Primary | ICD-10-CM

## 2020-01-14 ENCOUNTER — TREATMENT (OUTPATIENT)
Dept: CARDIAC REHAB | Facility: HOSPITAL | Age: 75
End: 2020-01-14

## 2020-01-14 DIAGNOSIS — Z00.00 PREVENTATIVE HEALTH CARE: Primary | ICD-10-CM

## 2020-01-16 ENCOUNTER — TREATMENT (OUTPATIENT)
Dept: CARDIAC REHAB | Facility: HOSPITAL | Age: 75
End: 2020-01-16

## 2020-01-16 DIAGNOSIS — Z00.00 PREVENTATIVE HEALTH CARE: Primary | ICD-10-CM

## 2020-01-21 ENCOUNTER — TREATMENT (OUTPATIENT)
Dept: CARDIAC REHAB | Facility: HOSPITAL | Age: 75
End: 2020-01-21

## 2020-01-21 DIAGNOSIS — Z00.00 PREVENTATIVE HEALTH CARE: Primary | ICD-10-CM

## 2020-01-23 ENCOUNTER — TREATMENT (OUTPATIENT)
Dept: CARDIAC REHAB | Facility: HOSPITAL | Age: 75
End: 2020-01-23

## 2020-01-23 DIAGNOSIS — Z00.00 PREVENTATIVE HEALTH CARE: Primary | ICD-10-CM

## 2020-02-11 ENCOUNTER — TREATMENT (OUTPATIENT)
Dept: CARDIAC REHAB | Facility: HOSPITAL | Age: 75
End: 2020-02-11

## 2020-02-11 DIAGNOSIS — Z00.00 PREVENTATIVE HEALTH CARE: Primary | ICD-10-CM

## 2020-02-14 ENCOUNTER — TREATMENT (OUTPATIENT)
Dept: CARDIAC REHAB | Facility: HOSPITAL | Age: 75
End: 2020-02-14

## 2020-02-14 DIAGNOSIS — Z00.00 PREVENTATIVE HEALTH CARE: Primary | ICD-10-CM

## 2020-02-15 ENCOUNTER — HOSPITAL ENCOUNTER (EMERGENCY)
Facility: HOSPITAL | Age: 75
Discharge: HOME | End: 2020-02-15
Attending: EMERGENCY MEDICINE | Admitting: EMERGENCY MEDICINE

## 2020-02-15 ENCOUNTER — APPOINTMENT (OUTPATIENT)
Dept: GENERAL RADIOLOGY | Facility: HOSPITAL | Age: 75
End: 2020-02-15

## 2020-02-15 VITALS
WEIGHT: 160 LBS | SYSTOLIC BLOOD PRESSURE: 162 MMHG | BODY MASS INDEX: 28.35 KG/M2 | RESPIRATION RATE: 18 BRPM | OXYGEN SATURATION: 98 % | HEART RATE: 65 BPM | DIASTOLIC BLOOD PRESSURE: 77 MMHG | TEMPERATURE: 97.6 F | HEIGHT: 63 IN

## 2020-02-15 DIAGNOSIS — I49.8 BIGEMINY: Primary | ICD-10-CM

## 2020-02-15 DIAGNOSIS — R53.1 GENERALIZED WEAKNESS: ICD-10-CM

## 2020-02-15 DIAGNOSIS — R42 DIZZINESS: ICD-10-CM

## 2020-02-15 LAB
ALBUMIN SERPL-MCNC: 4.2 G/DL (ref 3.5–5.2)
ALBUMIN/GLOB SERPL: 1.5 G/DL
ALP SERPL-CCNC: 74 U/L (ref 39–117)
ALT SERPL W P-5'-P-CCNC: 13 U/L (ref 1–33)
ANION GAP SERPL CALCULATED.3IONS-SCNC: 13 MMOL/L (ref 5–15)
AST SERPL-CCNC: 18 U/L (ref 1–32)
BASOPHILS # BLD AUTO: 0.02 10*3/MM3 (ref 0–0.2)
BASOPHILS NFR BLD AUTO: 0.4 % (ref 0–1.5)
BILIRUB SERPL-MCNC: 0.5 MG/DL (ref 0.2–1.2)
BUN BLD-MCNC: 7 MG/DL (ref 8–23)
BUN/CREAT SERPL: 9.1 (ref 7–25)
CALCIUM SPEC-SCNC: 9.3 MG/DL (ref 8.6–10.5)
CHLORIDE SERPL-SCNC: 99 MMOL/L (ref 98–107)
CO2 SERPL-SCNC: 24 MMOL/L (ref 22–29)
CREAT BLD-MCNC: 0.77 MG/DL (ref 0.57–1)
DEPRECATED RDW RBC AUTO: 45.7 FL (ref 37–54)
EOSINOPHIL # BLD AUTO: 0.2 10*3/MM3 (ref 0–0.4)
EOSINOPHIL NFR BLD AUTO: 3.6 % (ref 0.3–6.2)
ERYTHROCYTE [DISTWIDTH] IN BLOOD BY AUTOMATED COUNT: 12.5 % (ref 12.3–15.4)
GFR SERPL CREATININE-BSD FRML MDRD: 73 ML/MIN/1.73
GLOBULIN UR ELPH-MCNC: 2.8 GM/DL
GLUCOSE BLD-MCNC: 120 MG/DL (ref 65–99)
HCT VFR BLD AUTO: 39.4 % (ref 34–46.6)
HGB BLD-MCNC: 12.8 G/DL (ref 12–15.9)
HOLD SPECIMEN: NORMAL
HOLD SPECIMEN: NORMAL
IMM GRANULOCYTES # BLD AUTO: 0.01 10*3/MM3 (ref 0–0.05)
IMM GRANULOCYTES NFR BLD AUTO: 0.2 % (ref 0–0.5)
LYMPHOCYTES # BLD AUTO: 1.53 10*3/MM3 (ref 0.7–3.1)
LYMPHOCYTES NFR BLD AUTO: 27.5 % (ref 19.6–45.3)
MAGNESIUM SERPL-MCNC: 1.9 MG/DL (ref 1.6–2.4)
MCH RBC QN AUTO: 32 PG (ref 26.6–33)
MCHC RBC AUTO-ENTMCNC: 32.5 G/DL (ref 31.5–35.7)
MCV RBC AUTO: 98.5 FL (ref 79–97)
MONOCYTES # BLD AUTO: 0.65 10*3/MM3 (ref 0.1–0.9)
MONOCYTES NFR BLD AUTO: 11.7 % (ref 5–12)
NEUTROPHILS # BLD AUTO: 3.16 10*3/MM3 (ref 1.7–7)
NEUTROPHILS NFR BLD AUTO: 56.6 % (ref 42.7–76)
NRBC BLD AUTO-RTO: 0 /100 WBC (ref 0–0.2)
NT-PROBNP SERPL-MCNC: 1599 PG/ML (ref 5–900)
PLATELET # BLD AUTO: 188 10*3/MM3 (ref 140–450)
PMV BLD AUTO: 11.5 FL (ref 6–12)
POTASSIUM BLD-SCNC: 4.3 MMOL/L (ref 3.5–5.2)
PROT SERPL-MCNC: 7 G/DL (ref 6–8.5)
RBC # BLD AUTO: 4 10*6/MM3 (ref 3.77–5.28)
SODIUM BLD-SCNC: 136 MMOL/L (ref 136–145)
TROPONIN T SERPL-MCNC: <0.01 NG/ML (ref 0–0.03)
TROPONIN T SERPL-MCNC: <0.01 NG/ML (ref 0–0.03)
TSH SERPL DL<=0.05 MIU/L-ACNC: 2 UIU/ML (ref 0.27–4.2)
WBC NRBC COR # BLD: 5.57 10*3/MM3 (ref 3.4–10.8)
WHOLE BLOOD HOLD SPECIMEN: NORMAL
WHOLE BLOOD HOLD SPECIMEN: NORMAL

## 2020-02-15 PROCEDURE — 83735 ASSAY OF MAGNESIUM: CPT | Performed by: EMERGENCY MEDICINE

## 2020-02-15 PROCEDURE — 99284 EMERGENCY DEPT VISIT MOD MDM: CPT

## 2020-02-15 PROCEDURE — 83880 ASSAY OF NATRIURETIC PEPTIDE: CPT | Performed by: EMERGENCY MEDICINE

## 2020-02-15 PROCEDURE — 80053 COMPREHEN METABOLIC PANEL: CPT | Performed by: EMERGENCY MEDICINE

## 2020-02-15 PROCEDURE — 85025 COMPLETE CBC W/AUTO DIFF WBC: CPT | Performed by: EMERGENCY MEDICINE

## 2020-02-15 PROCEDURE — 93005 ELECTROCARDIOGRAM TRACING: CPT | Performed by: EMERGENCY MEDICINE

## 2020-02-15 PROCEDURE — 84443 ASSAY THYROID STIM HORMONE: CPT | Performed by: EMERGENCY MEDICINE

## 2020-02-15 PROCEDURE — 71045 X-RAY EXAM CHEST 1 VIEW: CPT

## 2020-02-15 PROCEDURE — 84484 ASSAY OF TROPONIN QUANT: CPT | Performed by: EMERGENCY MEDICINE

## 2020-02-15 RX ORDER — SODIUM CHLORIDE 0.9 % (FLUSH) 0.9 %
10 SYRINGE (ML) INJECTION AS NEEDED
Status: DISCONTINUED | OUTPATIENT
Start: 2020-02-15 | End: 2020-02-15 | Stop reason: HOSPADM

## 2020-02-15 NOTE — DISCHARGE INSTRUCTIONS
Rest.  Continue your current meds.  Call Dr. Hardin on Monday for follow up in office.  Return to ER if worse.  Plenty of fluids.

## 2020-02-15 NOTE — ED PROVIDER NOTES
"Subjective   Ms. Alicia Young is a 74 y.o. female who presents to the ED with c/o dizziness. The patient checks her heart rate and blood pressure at home and notes that her heart rate typically affects her blood pressure. Mid-September 2019, she had a heart catheterization by Dr. Hardin, cardiologist, and was put on numerous medications to help decrease her blood pressure. Her blood pressure and heart rate eventually lowered, and she notes that she began to feel better after her body \"leveled out\". She notes that she has been experiencing episodes of dizziness since August 2019, when her blood pressure and heart rate are lowered too far. She notes dizziness and headaches during the episodes, but denies any body pain, fever, cough, difficulty breathing, shortness of breath, rash, nausea, vomiting, diarrhea. She reports a history of hypertension, hyperlipidemia, heart attack in August 2019 and congestive heart failure. She notes a history of coronary artery bypass in 2011 (2 or 3 arteries). She was also seen by the urgent care today for her symptoms. She denies a history of smoking tobacco and alcohol consumption. There are no other acute complaints at this time.      History provided by:  Patient   used: No    Dizziness   Quality:  Unable to specify  Severity:  Moderate  Onset quality:  Gradual  Duration:  7 months  Timing:  Intermittent  Progression:  Unchanged  Chronicity:  Recurrent  Relieved by:  None tried  Worsened by:  Nothing  Ineffective treatments:  None tried  Associated symptoms: headaches    Associated symptoms: no diarrhea, no nausea, no shortness of breath and no vomiting    Risk factors: heart disease and new medications        Review of Systems   Constitutional: Negative for fever.   Eyes: Negative for visual disturbance.   Respiratory: Negative for cough and shortness of breath.         Negative for difficulty breathing.   Gastrointestinal: Negative for abdominal pain, " diarrhea, nausea and vomiting.   Genitourinary: Negative for dysuria.   Musculoskeletal: Negative for back pain and neck pain.   Skin: Negative for rash.   Neurological: Positive for dizziness and headaches.   Hematological: Negative.    Psychiatric/Behavioral: Negative.    All other systems reviewed and are negative.      Past Medical History:   Diagnosis Date   • Arthritis    • Depression    • GERD (gastroesophageal reflux disease)    • Hyperlipidemia    • Hypertension    • Macular degeneration        Allergies   Allergen Reactions   • Lipitor [Atorvastatin] Myalgia   • Ezetimibe Myalgia   • Statins Myalgia       Past Surgical History:   Procedure Laterality Date   • CARDIAC CATHETERIZATION     • CARDIAC CATHETERIZATION N/A 2019    Procedure: Left Heart Cath (64080);  Surgeon: Ronny Hardin MD;  Location: [facility] CATH INVASIVE LOCATION;  Service: Cardiovascular   • CHOLECYSTECTOMY     • CORONARY ARTERY BYPASS GRAFT         History reviewed. No pertinent family history.    Social History     Socioeconomic History   • Marital status:      Spouse name: Not on file   • Number of children: Not on file   • Years of education: Not on file   • Highest education level: Not on file   Tobacco Use   • Smoking status: Former Smoker     Last attempt to quit: 1988     Years since quittin.1   • Smokeless tobacco: Never Used   Substance and Sexual Activity   • Alcohol use: No   • Drug use: No   • Sexual activity: Defer         Objective   Physical Exam   Constitutional: She is oriented to person, place, and time. She appears well-developed and well-nourished. No distress.   HENT:   Head: Normocephalic and atraumatic.   Nose: Nose normal.   Eyes: Pupils are equal, round, and reactive to light. Conjunctivae are normal. No scleral icterus.   Neck: Normal range of motion. Neck supple.   Cardiovascular: Normal rate and intact distal pulses. An irregular rhythm present.   No murmur heard.  An irregular rhythm  with bigeminal PVCs present.    There is no edema of the lower extremities bilaterally.   Pulmonary/Chest: Effort normal and breath sounds normal. No respiratory distress.   Abdominal: Soft. Bowel sounds are normal. There is no tenderness.   Musculoskeletal: Normal range of motion.   Neurological: She is alert and oriented to person, place, and time.   Skin: Skin is warm and dry.   Psychiatric: She has a normal mood and affect. Her behavior is normal.   Nursing note and vitals reviewed.      Procedures         ED Course    Pt appears in no distress.  She was sent over due to slow heart rate of 36 but was in fact in bigeminy with rate in the 70s.  The PVCs were just not being felt.  I reviewed her prior records and see that she was in bigeminy in Dec 2019 as well. Two sets of negative troponins.  Pt reverted to all sinus rhythm while in ER. Will d/c home to f/u with Dr. Hardin.    ED Course as of Feb 15 1925   Sat Feb 15, 2020   1023 Don is consulting with Dr. Lowe about the patient.     [KO]   1830 Don is at bedside reevaluating and updating the patient.    [KO]      ED Course User Index  [KO] Domi Vazquez     Recent Results (from the past 24 hour(s))   Comprehensive Metabolic Panel    Collection Time: 02/15/20  2:35 PM   Result Value Ref Range    Glucose 120 (H) 65 - 99 mg/dL    BUN 7 (L) 8 - 23 mg/dL    Creatinine 0.77 0.57 - 1.00 mg/dL    Sodium 136 136 - 145 mmol/L    Potassium 4.3 3.5 - 5.2 mmol/L    Chloride 99 98 - 107 mmol/L    CO2 24.0 22.0 - 29.0 mmol/L    Calcium 9.3 8.6 - 10.5 mg/dL    Total Protein 7.0 6.0 - 8.5 g/dL    Albumin 4.20 3.50 - 5.20 g/dL    ALT (SGPT) 13 1 - 33 U/L    AST (SGOT) 18 1 - 32 U/L    Alkaline Phosphatase 74 39 - 117 U/L    Total Bilirubin 0.5 0.2 - 1.2 mg/dL    eGFR Non African Amer 73 >60 mL/min/1.73    Globulin 2.8 gm/dL    A/G Ratio 1.5 g/dL    BUN/Creatinine Ratio 9.1 7.0 - 25.0    Anion Gap 13.0 5.0 - 15.0 mmol/L   Magnesium    Collection Time: 02/15/20  2:35  PM   Result Value Ref Range    Magnesium 1.9 1.6 - 2.4 mg/dL   Troponin    Collection Time: 02/15/20  2:35 PM   Result Value Ref Range    Troponin T <0.010 0.000 - 0.030 ng/mL   TSH    Collection Time: 02/15/20  2:35 PM   Result Value Ref Range    TSH 2.000 0.270 - 4.200 uIU/mL   BNP    Collection Time: 02/15/20  2:35 PM   Result Value Ref Range    proBNP 1,599.0 (H) 5.0 - 900.0 pg/mL   Light Blue Top    Collection Time: 02/15/20  2:35 PM   Result Value Ref Range    Extra Tube hold for add-on    Green Top (Gel)    Collection Time: 02/15/20  2:35 PM   Result Value Ref Range    Extra Tube Hold for add-ons.    Lavender Top    Collection Time: 02/15/20  2:35 PM   Result Value Ref Range    Extra Tube hold for add-on    Gold Top - SST    Collection Time: 02/15/20  2:35 PM   Result Value Ref Range    Extra Tube Hold for add-ons.    CBC Auto Differential    Collection Time: 02/15/20  2:35 PM   Result Value Ref Range    WBC 5.57 3.40 - 10.80 10*3/mm3    RBC 4.00 3.77 - 5.28 10*6/mm3    Hemoglobin 12.8 12.0 - 15.9 g/dL    Hematocrit 39.4 34.0 - 46.6 %    MCV 98.5 (H) 79.0 - 97.0 fL    MCH 32.0 26.6 - 33.0 pg    MCHC 32.5 31.5 - 35.7 g/dL    RDW 12.5 12.3 - 15.4 %    RDW-SD 45.7 37.0 - 54.0 fl    MPV 11.5 6.0 - 12.0 fL    Platelets 188 140 - 450 10*3/mm3    Neutrophil % 56.6 42.7 - 76.0 %    Lymphocyte % 27.5 19.6 - 45.3 %    Monocyte % 11.7 5.0 - 12.0 %    Eosinophil % 3.6 0.3 - 6.2 %    Basophil % 0.4 0.0 - 1.5 %    Immature Grans % 0.2 0.0 - 0.5 %    Neutrophils, Absolute 3.16 1.70 - 7.00 10*3/mm3    Lymphocytes, Absolute 1.53 0.70 - 3.10 10*3/mm3    Monocytes, Absolute 0.65 0.10 - 0.90 10*3/mm3    Eosinophils, Absolute 0.20 0.00 - 0.40 10*3/mm3    Basophils, Absolute 0.02 0.00 - 0.20 10*3/mm3    Immature Grans, Absolute 0.01 0.00 - 0.05 10*3/mm3    nRBC 0.0 0.0 - 0.2 /100 WBC   Troponin    Collection Time: 02/15/20  5:45 PM   Result Value Ref Range    Troponin T <0.010 0.000 - 0.030 ng/mL     Note: In addition to lab  results from this visit, the labs listed above may include labs taken at another facility or during a different encounter within the last 24 hours. Please correlate lab times with ED admission and discharge times for further clarification of the services performed during this visit.    XR Chest 1 View   Preliminary Result   No new chest disease.       DICTATED:   02/15/2020   EDITED/ls :   02/15/2020             Vitals:    02/15/20 1615 02/15/20 1630 02/15/20 1645 02/15/20 1815   BP: 122/63 115/62 132/64 162/77   BP Location:       Patient Position:       Pulse: 62 61 62 65   Resp:       Temp:       TempSrc:       SpO2: 97% 97% 98% 98%   Weight:       Height:         Medications   sodium chloride 0.9 % flush 10 mL (has no administration in time range)     ECG/EMG Results (last 24 hours)     ** No results found for the last 24 hours. **        ECG 12 Lead         ECG 12 Lead                                                        MDM    Final diagnoses:   Bigeminy   Generalized weakness   Dizziness       Documentation assistance provided by violette Vazquez.  Information recorded by the violette was done at my direction and has been verified and validated by me.     Domi Vazquez  02/15/20 1428       Domi Vazquez  02/15/20 1705       Domi Vazquez  02/15/20 5470       Don Ulloa PA  02/15/20 1925

## 2020-02-17 ENCOUNTER — OFFICE VISIT (OUTPATIENT)
Dept: ORTHOPEDIC SURGERY | Facility: CLINIC | Age: 75
End: 2020-02-17

## 2020-02-17 VITALS — HEART RATE: 76 BPM | HEIGHT: 63 IN | WEIGHT: 160.05 LBS | BODY MASS INDEX: 28.36 KG/M2 | OXYGEN SATURATION: 99 %

## 2020-02-17 DIAGNOSIS — M17.12 PRIMARY OSTEOARTHRITIS OF LEFT KNEE: Primary | ICD-10-CM

## 2020-02-17 PROCEDURE — 20610 DRAIN/INJ JOINT/BURSA W/O US: CPT | Performed by: ORTHOPAEDIC SURGERY

## 2020-02-17 NOTE — PROGRESS NOTES
[facility] [place] Orthopaedic Surgery Clinic Note    Subjective     Chief Complaint   Patient presents with   • Follow-up     3 months follow up for Primary osteoarthritis of left knee         HPI    It has been 3  month(s) since Ms. Young's last visit. She returns to clinic today for follow-up of left knee arthritis. She rates her pain a 3/10 on the pain scale. Previous/current treatments: cane/walker and weight loss. Current symptoms: pain, swelling, popping, grinding, stiffness and giving way/buckling. The pain is worse with walking and climbing stairs; resting improve the pain. Overall, she is doing the same.  She would like to consider Visco supplementation injections at this time.    I have reviewed the following portions of the patient's history:History of Present Illness        Patient Active Problem List   Diagnosis   • Abnormal stress test     Past Medical History:   Diagnosis Date   • Arthritis    • Depression    • GERD (gastroesophageal reflux disease)    • Hyperlipidemia    • Hypertension    • Macular degeneration       Past Surgical History:   Procedure Laterality Date   • CARDIAC CATHETERIZATION     • CARDIAC CATHETERIZATION N/A 2019    Procedure: Left Heart Cath (28969);  Surgeon: Ronny Hardin MD;  Location: [facility] INVASIVE LOCATION;  Service: Cardiovascular   • CHOLECYSTECTOMY     • CORONARY ARTERY BYPASS GRAFT        No family history on file.  Social History     Socioeconomic History   • Marital status:      Spouse name: Not on file   • Number of children: Not on file   • Years of education: Not on file   • Highest education level: Not on file   Tobacco Use   • Smoking status: Former Smoker     Last attempt to quit: 1988     Years since quittin.1   • Smokeless tobacco: Never Used   Substance and Sexual Activity   • Alcohol use: No   • Drug use: No   • Sexual activity: Defer      Current Outpatient Medications on File Prior to Visit   Medication Sig Dispense Refill   •  aspirin 81 MG chewable tablet Chew 81 mg Daily.     • carvedilol (COREG) 3.125 MG tablet Take 1 tablet by mouth 2 (Two) Times a Day. 180 tablet 3   • Evolocumab (REPATHA SC) Inject  under the skin into the appropriate area as directed.     • FLUoxetine (PROzac) 20 MG capsule Take 20 mg by mouth Daily.     • meloxicam (MOBIC) 7.5 MG tablet Take 7.5 mg by mouth Daily.     • Multiple Vitamins-Minerals (PRESERVISION AREDS PO) PreserVision AREDS   one tablet daily     • NITROGLYCERIN PATCH      • pantoprazole (PROTONIX) 40 MG EC tablet Take 40 mg by mouth Daily.     • sacubitril-valsartan (ENTRESTO) 24-26 MG tablet Take 1 tablet by mouth 2 (Two) Times a Day. 60 tablet 5   • spironolactone (ALDACTONE) 25 MG tablet Take 1 tablet by mouth Daily. 30 tablet 11   • [DISCONTINUED] ALPRAZolam (XANAX) 0.5 MG tablet Take 0.5 mg by mouth 2 (Two) Times a Day As Needed for Anxiety.     • [DISCONTINUED] calcium carbonate (CALCIUM 600) 600 MG tablet Take 600 mg by mouth Daily.     • [DISCONTINUED] Multiple Vitamins-Minerals (MULTIVITAMIN ADULT PO) Take  by mouth.     • [DISCONTINUED] Multiple Vitamins-Minerals (PRESERVISION AREDS 2 PO) Take  by mouth 2 (Two) Times a Day.     • [DISCONTINUED] oxybutynin (DITROPAN) 5 MG tablet Take 5 mg by mouth Daily.       No current facility-administered medications on file prior to visit.       Allergies   Allergen Reactions   • Lipitor [Atorvastatin] Myalgia   • Ezetimibe Myalgia   • Statins Myalgia        Review of Systems   Constitutional: Positive for activity change.   HENT: Positive for hearing loss, postnasal drip, sneezing and tinnitus.    Eyes: Positive for visual disturbance.   Respiratory: Negative.    Cardiovascular: Positive for palpitations.   Gastrointestinal: Negative.    Endocrine: Positive for heat intolerance.   Genitourinary: Positive for urgency.   Musculoskeletal: Positive for arthralgias, gait problem, joint swelling, neck pain and neck stiffness.   Skin: Negative.     "  Allergic/Immunologic: Positive for environmental allergies.   Neurological: Positive for dizziness, weakness, light-headedness and headaches.   Hematological: Negative.    Psychiatric/Behavioral: Positive for agitation, confusion and decreased concentration.        Objective      Physical Exam  Pulse 76   Ht 160 cm (62.99\")   Wt 72.6 kg (160 lb 0.9 oz)   SpO2 99%   BMI 28.36 kg/m²     Body mass index is 28.36 kg/m².    General:   Mental Status:  Alert   Appearance: Cooperative, in no acute distress   Build and Nutrition: Well-nourished well-developed female   Orientation: Alert and oriented to person, place and time   Posture: Normal   Gait: Normal    Lower Extremities:              Left Knee:                          Tenderness:    Mild medial joint line tenderness                          Effusion:          None                          Swelling:          None                          Crepitus:          Positive                          Atrophy:           None                          Range of motion:        Extension:       0°                                                              Flexion:           130°  Instability:        No varus laxity, no valgus laxity, negative anterior drawer  Deformities:     None      Assessment and Plan     Alicia was seen today for follow-up.    Diagnoses and all orders for this visit:    Primary osteoarthritis of left knee  -     Large Joint Arthrocentesis: L knee        1. Primary osteoarthritis of left knee        I reviewed my findings with patient today.  Left knee continues to bother her, particular with stair climbing.  She would like to consider Visco supplementation injections at this time, we will submit for approval.  I will see her back once they are ready for administration.    Addendum: Injections were approved today, and therefore were started.  Please see my procedure note for details.    Return in about 1 week (around 2/24/2020) for Injection.        Medical " Decision Making  Management Options : prescription/IM medicine      Heladio Arroyo MD  02/17/20  5:57 PM

## 2020-02-17 NOTE — PROGRESS NOTES

## 2020-02-18 ENCOUNTER — TREATMENT (OUTPATIENT)
Dept: CARDIAC REHAB | Facility: HOSPITAL | Age: 75
End: 2020-02-18

## 2020-02-18 DIAGNOSIS — Z00.00 PREVENTATIVE HEALTH CARE: Primary | ICD-10-CM

## 2020-02-18 NOTE — PROGRESS NOTES
"Pt states she was in the ED over the weekend for low heart rate and dizziness. Pt states her HR was in the 30's but was informed she was having PVCs. Troponins were negative. Pt was discharged with a follow up scheduled. Pt met with Dr. Hardin this morning who discontinued her Entresto. Pt states an echo was performed and she was told she has \"a leaky valve.\" She has already taken her dose today but plans to discontinue in the future. CR staff will continue to follow.  "

## 2020-02-20 ENCOUNTER — TREATMENT (OUTPATIENT)
Dept: CARDIAC REHAB | Facility: HOSPITAL | Age: 75
End: 2020-02-20

## 2020-02-20 DIAGNOSIS — Z00.00 PREVENTATIVE HEALTH CARE: Primary | ICD-10-CM

## 2020-02-25 ENCOUNTER — CLINICAL SUPPORT (OUTPATIENT)
Dept: ORTHOPEDIC SURGERY | Facility: CLINIC | Age: 75
End: 2020-02-25

## 2020-02-25 ENCOUNTER — TREATMENT (OUTPATIENT)
Dept: CARDIAC REHAB | Facility: HOSPITAL | Age: 75
End: 2020-02-25

## 2020-02-25 DIAGNOSIS — Z00.00 PREVENTATIVE HEALTH CARE: Primary | ICD-10-CM

## 2020-02-25 DIAGNOSIS — M17.12 PRIMARY OSTEOARTHRITIS OF LEFT KNEE: Primary | ICD-10-CM

## 2020-02-25 PROCEDURE — 20610 DRAIN/INJ JOINT/BURSA W/O US: CPT | Performed by: ORTHOPAEDIC SURGERY

## 2020-02-25 NOTE — PROGRESS NOTES
[facility] [place] [place] Orthopaedic Surgery Clinic Note    Subjective     Chief Complaint   Patient presents with   • Injections     left knee orthovisc injection #2         HPI    Alicia Young is a 74 y.o. female who presents for the second Orthovisc injection in the left knee.  Of note, she has seen some improvement so far.    Patient Active Problem List   Diagnosis   • Abnormal stress test     Past Medical History:   Diagnosis Date   • Arthritis    • Depression    • GERD (gastroesophageal reflux disease)    • Hyperlipidemia    • Hypertension    • Macular degeneration       Past Surgical History:   Procedure Laterality Date   • CARDIAC CATHETERIZATION     • CARDIAC CATHETERIZATION N/A 2019    Procedure: Left Heart Cath ([id number]);  Surgeon: Ronny Hardin MD;  Location: [facility] CATH INVASIVE LOCATION;  Service: Cardiovascular   • CHOLECYSTECTOMY     • CORONARY ARTERY BYPASS GRAFT        History reviewed. No pertinent family history.  Social History     Socioeconomic History   • Marital status:      Spouse name: Not on file   • Number of children: Not on file   • Years of education: Not on file   • Highest education level: Not on file   Tobacco Use   • Smoking status: Former Smoker     Last attempt to quit: 1988     Years since quittin.1   • Smokeless tobacco: Never Used   Substance and Sexual Activity   • Alcohol use: No   • Drug use: No   • Sexual activity: Defer      Current Outpatient Medications on File Prior to Visit   Medication Sig Dispense Refill   • aspirin 81 MG chewable tablet Chew 81 mg Daily.     • carvedilol (COREG) 3.125 MG tablet Take 1 tablet by mouth 2 (Two) Times a Day. 180 tablet 3   • Evolocumab (REPATHA SC) Inject  under the skin into the appropriate area as directed.     • FLUoxetine (PROzac) 20 MG capsule Take 20 mg by mouth Daily.     • meloxicam (MOBIC) 7.5 MG tablet Take 7.5 mg by mouth Daily.     • Multiple Vitamins-Minerals (PRESERVISION AREDS PO) PreserVision AREDS   one  tablet daily     • NITROGLYCERIN PATCH      • pantoprazole (PROTONIX) 40 MG EC tablet Take 40 mg by mouth Daily.     • spironolactone (ALDACTONE) 25 MG tablet Take 1 tablet by mouth Daily. 30 tablet 11   • [DISCONTINUED] sacubitril-valsartan (ENTRESTO) 24-26 MG tablet Take 1 tablet by mouth 2 (Two) Times a Day. 60 tablet 5     No current facility-administered medications on file prior to visit.       Allergies   Allergen Reactions   • Lipitor [Atorvastatin] Myalgia   • Ezetimibe Myalgia   • Statins Myalgia        Review of Systems     Objective      Physical Exam  There were no vitals taken for this visit.    There is no height or weight on file to calculate BMI.    General:   Mental Status:  Alert   Appearance: Cooperative, in no acute distress   Posture: Normal    Assessment and Plan     Alicia was seen today for injections.    Diagnoses and all orders for this visit:    Primary osteoarthritis of left knee  -     Large Joint Arthrocentesis: L knee        1. Primary osteoarthritis of left knee        Administration of viscosupplementation today.  Please see my procedure note for details.    Return in about 1 week (around 3/3/2020) for Injection.    Heladio Arroyo MD  02/25/20  10:08 AM

## 2020-02-25 NOTE — PROGRESS NOTES
Procedure   Large Joint Arthrocentesis: L knee  Date/Time: 2/25/2020 9:49 AM  Consent given by: patient  Site marked: site marked  Timeout: Immediately prior to procedure a time out was called to verify the correct patient, procedure, equipment, support staff and site/side marked as required   Supporting Documentation  Indications: pain   Procedure Details  Location: knee - L knee  Preparation: Patient was prepped and draped in the usual sterile fashion  Needle size: 22 G  Approach: anterolateral  Medications administered: 30 mg Hyaluronan 30 MG/2ML  Patient tolerance: patient tolerated the procedure well with no immediate complications

## 2020-02-27 ENCOUNTER — TREATMENT (OUTPATIENT)
Dept: CARDIAC REHAB | Facility: HOSPITAL | Age: 75
End: 2020-02-27

## 2020-02-27 DIAGNOSIS — Z00.00 PREVENTATIVE HEALTH CARE: Primary | ICD-10-CM

## 2020-03-03 ENCOUNTER — CLINICAL SUPPORT (OUTPATIENT)
Dept: ORTHOPEDIC SURGERY | Facility: CLINIC | Age: 75
End: 2020-03-03

## 2020-03-03 ENCOUNTER — TREATMENT (OUTPATIENT)
Dept: CARDIAC REHAB | Facility: HOSPITAL | Age: 75
End: 2020-03-03

## 2020-03-03 DIAGNOSIS — Z00.00 PREVENTATIVE HEALTH CARE: Primary | ICD-10-CM

## 2020-03-03 DIAGNOSIS — M17.12 PRIMARY OSTEOARTHRITIS OF LEFT KNEE: Primary | ICD-10-CM

## 2020-03-03 PROCEDURE — 20610 DRAIN/INJ JOINT/BURSA W/O US: CPT | Performed by: ORTHOPAEDIC SURGERY

## 2020-03-03 NOTE — PROGRESS NOTES
Procedure   Large Joint Arthrocentesis: L knee  Date/Time: 3/3/2020 9:45 AM  Consent given by: patient  Site marked: site marked  Timeout: Immediately prior to procedure a time out was called to verify the correct patient, procedure, equipment, support staff and site/side marked as required   Supporting Documentation  Indications: pain   Procedure Details  Location: knee - L knee  Preparation: Patient was prepped and draped in the usual sterile fashion  Needle size: 22 G  Approach: anterolateral  Medications administered: 30 mg Hyaluronan 30 MG/2ML  Patient tolerance: patient tolerated the procedure well with no immediate complications

## 2020-03-03 NOTE — PROGRESS NOTES
[facility] [place] Orthopaedic Surgery Clinic Note    Subjective     Chief Complaint   Patient presents with   • Injections     Left Knee Orthovisc injection #3        HPI    Alicia Young is a 74 y.o. female who presents for the third and final Orthovisc injection into the left knee.  Of note, she has not seen much improvement to this point.    Patient Active Problem List   Diagnosis   • Abnormal stress test     Past Medical History:   Diagnosis Date   • Arthritis    • Depression    • GERD (gastroesophageal reflux disease)    • Hyperlipidemia    • Hypertension    • Macular degeneration       Past Surgical History:   Procedure Laterality Date   • CARDIAC CATHETERIZATION     • CARDIAC CATHETERIZATION N/A 2019    Procedure: Left Heart Cath (44045);  Surgeon: Ronny Hardin MD;  [facility];  Service: Cardiovascular   • CHOLECYSTECTOMY     • CORONARY ARTERY BYPASS GRAFT        History reviewed. No pertinent family history.  Social History     Socioeconomic History   • Marital status:      Spouse name: Not on file   • Number of children: Not on file   • Years of education: Not on file   • Highest education level: Not on file   Tobacco Use   • Smoking status: Former Smoker     Last attempt to quit: 1988     Years since quittin.1   • Smokeless tobacco: Never Used   Substance and Sexual Activity   • Alcohol use: No   • Drug use: No   • Sexual activity: Defer      Current Outpatient Medications on File Prior to Visit   Medication Sig Dispense Refill   • aspirin 81 MG chewable tablet Chew 81 mg Daily.     • carvedilol (COREG) 3.125 MG tablet Take 1 tablet by mouth 2 (Two) Times a Day. 180 tablet 3   • Evolocumab (REPATHA SC) Inject  under the skin into the appropriate area as directed.     • FLUoxetine (PROzac) 20 MG capsule Take 20 mg by mouth Daily.     • meloxicam (MOBIC) 7.5 MG tablet Take 7.5 mg by mouth Daily.     • Multiple Vitamins-Minerals (PRESERVISION AREDS PO)  PreserVision AREDS   one tablet daily     • NITROGLYCERIN PATCH      • pantoprazole (PROTONIX) 40 MG EC tablet Take 40 mg by mouth Daily.     • spironolactone (ALDACTONE) 25 MG tablet Take 1 tablet by mouth Daily. 30 tablet 11     No current facility-administered medications on file prior to visit.       Allergies   Allergen Reactions   • Lipitor [Atorvastatin] Myalgia   • Ezetimibe Myalgia   • Statins Myalgia        Review of Systems   Constitutional: Negative.    HENT: Negative.    Eyes: Negative.    Respiratory: Negative.    Cardiovascular: Negative.    Gastrointestinal: Negative.    Endocrine: Negative.    Genitourinary: Negative.    Musculoskeletal: Positive for arthralgias.   Skin: Negative.    Allergic/Immunologic: Negative.    Neurological: Negative.    Hematological: Negative.    Psychiatric/Behavioral: Negative.         Objective      Physical Exam  There were no vitals taken for this visit.    There is no height or weight on file to calculate BMI.    General:   Mental Status:  Alert   Appearance: Cooperative, in no acute distress   Posture: Normal    Assessment and Plan     Alicia was seen today for injections.    Diagnoses and all orders for this visit:    Primary osteoarthritis of left knee  -     Large Joint Arthrocentesis: L knee        1. Primary osteoarthritis of left knee        Administration of viscosupplementation today.  Please see my procedure note for details.    Return in about 2 months (around 5/3/2020).    Heladio Arroyo MD  03/03/20  9:51 AM

## 2020-03-06 ENCOUNTER — TREATMENT (OUTPATIENT)
Dept: CARDIAC REHAB | Facility: HOSPITAL | Age: 75
End: 2020-03-06

## 2020-03-06 DIAGNOSIS — Z00.00 PREVENTATIVE HEALTH CARE: Primary | ICD-10-CM

## 2020-03-11 ENCOUNTER — TREATMENT (OUTPATIENT)
Dept: CARDIAC REHAB | Facility: HOSPITAL | Age: 75
End: 2020-03-11

## 2020-03-11 DIAGNOSIS — Z00.00 PREVENTATIVE HEALTH CARE: Primary | ICD-10-CM

## 2020-03-12 ENCOUNTER — APPOINTMENT (OUTPATIENT)
Dept: CARDIAC REHAB | Facility: HOSPITAL | Age: 75
End: 2020-03-12

## 2020-03-13 ENCOUNTER — APPOINTMENT (OUTPATIENT)
Dept: CARDIAC REHAB | Facility: HOSPITAL | Age: 75
End: 2020-03-13

## 2020-03-16 ENCOUNTER — APPOINTMENT (OUTPATIENT)
Dept: CARDIAC REHAB | Facility: HOSPITAL | Age: 75
End: 2020-03-16

## 2020-03-17 ENCOUNTER — APPOINTMENT (OUTPATIENT)
Dept: CARDIAC REHAB | Facility: HOSPITAL | Age: 75
End: 2020-03-17

## 2020-03-18 ENCOUNTER — APPOINTMENT (OUTPATIENT)
Dept: CARDIAC REHAB | Facility: HOSPITAL | Age: 75
End: 2020-03-18

## 2020-03-19 ENCOUNTER — APPOINTMENT (OUTPATIENT)
Dept: CARDIAC REHAB | Facility: HOSPITAL | Age: 75
End: 2020-03-19

## 2020-03-20 ENCOUNTER — APPOINTMENT (OUTPATIENT)
Dept: CARDIAC REHAB | Facility: HOSPITAL | Age: 75
End: 2020-03-20

## 2020-03-23 ENCOUNTER — APPOINTMENT (OUTPATIENT)
Dept: CARDIAC REHAB | Facility: HOSPITAL | Age: 75
End: 2020-03-23

## 2020-03-24 ENCOUNTER — APPOINTMENT (OUTPATIENT)
Dept: CARDIAC REHAB | Facility: HOSPITAL | Age: 75
End: 2020-03-24

## 2020-03-25 ENCOUNTER — APPOINTMENT (OUTPATIENT)
Dept: CARDIAC REHAB | Facility: HOSPITAL | Age: 75
End: 2020-03-25

## 2020-03-26 ENCOUNTER — APPOINTMENT (OUTPATIENT)
Dept: CARDIAC REHAB | Facility: HOSPITAL | Age: 75
End: 2020-03-26

## 2020-03-27 ENCOUNTER — APPOINTMENT (OUTPATIENT)
Dept: CARDIAC REHAB | Facility: HOSPITAL | Age: 75
End: 2020-03-27

## 2020-03-30 ENCOUNTER — APPOINTMENT (OUTPATIENT)
Dept: CARDIAC REHAB | Facility: HOSPITAL | Age: 75
End: 2020-03-30

## 2020-03-31 ENCOUNTER — APPOINTMENT (OUTPATIENT)
Dept: CARDIAC REHAB | Facility: HOSPITAL | Age: 75
End: 2020-03-31

## 2020-04-01 ENCOUNTER — APPOINTMENT (OUTPATIENT)
Dept: CARDIAC REHAB | Facility: HOSPITAL | Age: 75
End: 2020-04-01

## 2020-04-02 ENCOUNTER — APPOINTMENT (OUTPATIENT)
Dept: CARDIAC REHAB | Facility: HOSPITAL | Age: 75
End: 2020-04-02

## 2020-04-03 ENCOUNTER — APPOINTMENT (OUTPATIENT)
Dept: CARDIAC REHAB | Facility: HOSPITAL | Age: 75
End: 2020-04-03

## 2020-04-06 ENCOUNTER — APPOINTMENT (OUTPATIENT)
Dept: CARDIAC REHAB | Facility: HOSPITAL | Age: 75
End: 2020-04-06

## 2020-04-07 ENCOUNTER — APPOINTMENT (OUTPATIENT)
Dept: CARDIAC REHAB | Facility: HOSPITAL | Age: 75
End: 2020-04-07

## 2020-04-08 ENCOUNTER — APPOINTMENT (OUTPATIENT)
Dept: CARDIAC REHAB | Facility: HOSPITAL | Age: 75
End: 2020-04-08

## 2020-04-09 ENCOUNTER — APPOINTMENT (OUTPATIENT)
Dept: CARDIAC REHAB | Facility: HOSPITAL | Age: 75
End: 2020-04-09

## 2020-04-10 ENCOUNTER — APPOINTMENT (OUTPATIENT)
Dept: CARDIAC REHAB | Facility: HOSPITAL | Age: 75
End: 2020-04-10

## 2020-04-13 ENCOUNTER — APPOINTMENT (OUTPATIENT)
Dept: CARDIAC REHAB | Facility: HOSPITAL | Age: 75
End: 2020-04-13

## 2020-04-14 ENCOUNTER — APPOINTMENT (OUTPATIENT)
Dept: CARDIAC REHAB | Facility: HOSPITAL | Age: 75
End: 2020-04-14

## 2020-04-15 ENCOUNTER — APPOINTMENT (OUTPATIENT)
Dept: CARDIAC REHAB | Facility: HOSPITAL | Age: 75
End: 2020-04-15

## 2020-04-16 ENCOUNTER — APPOINTMENT (OUTPATIENT)
Dept: CARDIAC REHAB | Facility: HOSPITAL | Age: 75
End: 2020-04-16

## 2020-04-17 ENCOUNTER — APPOINTMENT (OUTPATIENT)
Dept: CARDIAC REHAB | Facility: HOSPITAL | Age: 75
End: 2020-04-17

## 2020-04-20 ENCOUNTER — APPOINTMENT (OUTPATIENT)
Dept: CARDIAC REHAB | Facility: HOSPITAL | Age: 75
End: 2020-04-20

## 2020-04-21 ENCOUNTER — APPOINTMENT (OUTPATIENT)
Dept: CARDIAC REHAB | Facility: HOSPITAL | Age: 75
End: 2020-04-21

## 2020-04-22 ENCOUNTER — APPOINTMENT (OUTPATIENT)
Dept: CARDIAC REHAB | Facility: HOSPITAL | Age: 75
End: 2020-04-22

## 2020-04-23 ENCOUNTER — APPOINTMENT (OUTPATIENT)
Dept: CARDIAC REHAB | Facility: HOSPITAL | Age: 75
End: 2020-04-23

## 2020-04-24 ENCOUNTER — APPOINTMENT (OUTPATIENT)
Dept: CARDIAC REHAB | Facility: HOSPITAL | Age: 75
End: 2020-04-24

## 2020-04-27 ENCOUNTER — APPOINTMENT (OUTPATIENT)
Dept: CARDIAC REHAB | Facility: HOSPITAL | Age: 75
End: 2020-04-27

## 2020-04-28 ENCOUNTER — APPOINTMENT (OUTPATIENT)
Dept: CARDIAC REHAB | Facility: HOSPITAL | Age: 75
End: 2020-04-28

## 2020-04-29 ENCOUNTER — APPOINTMENT (OUTPATIENT)
Dept: CARDIAC REHAB | Facility: HOSPITAL | Age: 75
End: 2020-04-29

## 2020-04-30 ENCOUNTER — APPOINTMENT (OUTPATIENT)
Dept: CARDIAC REHAB | Facility: HOSPITAL | Age: 75
End: 2020-04-30

## 2020-05-01 ENCOUNTER — APPOINTMENT (OUTPATIENT)
Dept: CARDIAC REHAB | Facility: HOSPITAL | Age: 75
End: 2020-05-01

## 2020-05-04 ENCOUNTER — APPOINTMENT (OUTPATIENT)
Dept: CARDIAC REHAB | Facility: HOSPITAL | Age: 75
End: 2020-05-04

## 2020-05-04 ENCOUNTER — OFFICE VISIT (OUTPATIENT)
Dept: ORTHOPEDIC SURGERY | Facility: CLINIC | Age: 75
End: 2020-05-04

## 2020-05-04 DIAGNOSIS — M17.12 PRIMARY OSTEOARTHRITIS OF LEFT KNEE: Primary | ICD-10-CM

## 2020-05-04 PROCEDURE — 99441 PR PHYS/QHP TELEPHONE EVALUATION 5-10 MIN: CPT | Performed by: ORTHOPAEDIC SURGERY

## 2020-05-04 RX ORDER — MELOXICAM 15 MG/1
TABLET ORAL DAILY
Status: ON HOLD | COMMUNITY
Start: 2020-03-16 | End: 2020-10-02 | Stop reason: SDUPTHER

## 2020-05-04 NOTE — PROGRESS NOTES
[facility] [place] Orthopaedic Surgery Clinic Note    Subjective     Chief Complaint   Patient presents with   • Follow-up     2 month follow up; Primary osteoarthritis of left knee-OrthoVisc injection given 3/3/20      You have chosen to receive care through a telephone visit. Do you consent to use a telephone visit for your medical care today? Yes    HPI    It has been 2  month(s) since Ms. Young's last visit. She returns to clinic today for follow-up of left knee arthritis. She rates her pain a 3/10 on the pain scale. Previous/current treatments: cane/walker, NSAIDS and viscosupplementation (last injection 03/3/20). Current symptoms: pain, swelling, popping, grinding, stiffness and giving way/buckling. The pain is worse with walking, climbing stairs and sleeping; resting, heat, assistive device (cane/walker) and pain medication and/or NSAID improve the pain. Overall, she is doing better.  She noted relief with the Visco supplementation injections for several weeks, but seems to be wearing off slowly.  She had to use a cane starting last Thursday.  Steroid injections have not helped out significantly in the recent past.    I have reviewed the following portions of the patient's history:History of Present Illness     Patient Active Problem List   Diagnosis   • Abnormal stress test     Past Medical History:   Diagnosis Date   • Arthritis    • Depression    • GERD (gastroesophageal reflux disease)    • Hyperlipidemia    • Hypertension    • Macular degeneration       Past Surgical History:   Procedure Laterality Date   • CARDIAC CATHETERIZATION     • CARDIAC CATHETERIZATION N/A 9/16/2019    Procedure: Left Heart Cath ([id number]);  Surgeon: Ronny Hardin MD;  Location: [facility] CATH INVASIVE LOCATION;  Service: Cardiovascular   • CHOLECYSTECTOMY     • CORONARY ARTERY BYPASS GRAFT        History reviewed. No pertinent family history.  Social History     Socioeconomic History   • Marital status:      Spouse name: Not on  file   • Number of children: Not on file   • Years of education: Not on file   • Highest education level: Not on file   Tobacco Use   • Smoking status: Former Smoker     Last attempt to quit: 1988     Years since quittin.3   • Smokeless tobacco: Never Used   Substance and Sexual Activity   • Alcohol use: No   • Drug use: No   • Sexual activity: Defer      Current Outpatient Medications on File Prior to Visit   Medication Sig Dispense Refill   • aspirin 81 MG chewable tablet Chew 81 mg Daily.     • carvedilol (COREG) 3.125 MG tablet Take 1 tablet by mouth 2 (Two) Times a Day. 180 tablet 3   • Evolocumab (REPATHA SC) Inject  under the skin into the appropriate area as directed.     • FLUoxetine (PROzac) 20 MG capsule Take 20 mg by mouth Daily.     • meloxicam (MOBIC) 15 MG tablet TK 1 T PO QD PRN     • Multiple Vitamins-Minerals (PRESERVISION AREDS PO) PreserVision AREDS   one tablet daily     • NITROGLYCERIN PATCH      • pantoprazole (PROTONIX) 40 MG EC tablet Take 40 mg by mouth Daily.     • spironolactone (ALDACTONE) 25 MG tablet Take 1 tablet by mouth Daily. 30 tablet 11   • [DISCONTINUED] meloxicam (MOBIC) 7.5 MG tablet Take 7.5 mg by mouth Daily.       No current facility-administered medications on file prior to visit.       Allergies   Allergen Reactions   • Lipitor [Atorvastatin] Myalgia   • Ezetimibe Myalgia   • Statins Myalgia        Review of Systems   Constitutional: Negative.    HENT: Negative.    Eyes: Negative.    Respiratory: Negative.    Cardiovascular: Negative.    Gastrointestinal: Negative.    Endocrine: Negative.    Genitourinary: Negative.    Musculoskeletal: Positive for arthralgias.   Skin: Negative.    Allergic/Immunologic: Negative.    Neurological: Negative.    Hematological: Negative.    Psychiatric/Behavioral: Negative.         Objective      Physical Exam  There were no vitals taken for this visit.    There is no height or weight on file to calculate BMI.    No physical  examination (telephone visit).      Assessment and Plan     Alicia was seen today for follow-up.    Diagnoses and all orders for this visit:    Primary osteoarthritis of left knee        1. Primary osteoarthritis of left knee        I reviewed my findings with the patient.  She did have some relief with the Visco supplementation injections, more so than she has with steroid injections.  However, the pain is starting to come back.  She has maximized conservative treatment.  She may be a candidate for knee replacement surgery in the future.  I will see her in 4 months, but sooner for any problems.  Repeat Visco supplementation injections may be considered at that time, versus discussion of knee replacement surgery.  If she does have worsening between now and then, I will be happy to see her in the office to discuss further.    Return in about 4 months (around 9/4/2020).       This visit has been rescheduled as a phone visit to comply with patient safety concerns in accordance with CDC recommendations. Total time of discussion was 9 minutes.      Heladio Arroyo MD  05/04/20  11:11    Dragon disclaimer:  Much of this encounter note is an electronic transcription/translation of spoken language to printed text. The electronic translation of spoken language may permit erroneous, or at times, nonsensical words or phrases to be inadvertently transcribed; Although I have reviewed the note for such errors, some may still exist.

## 2020-05-05 ENCOUNTER — APPOINTMENT (OUTPATIENT)
Dept: CARDIAC REHAB | Facility: HOSPITAL | Age: 75
End: 2020-05-05

## 2020-05-06 ENCOUNTER — APPOINTMENT (OUTPATIENT)
Dept: CARDIAC REHAB | Facility: HOSPITAL | Age: 75
End: 2020-05-06

## 2020-05-08 ENCOUNTER — APPOINTMENT (OUTPATIENT)
Dept: CARDIAC REHAB | Facility: HOSPITAL | Age: 75
End: 2020-05-08

## 2020-05-11 ENCOUNTER — APPOINTMENT (OUTPATIENT)
Dept: CARDIAC REHAB | Facility: HOSPITAL | Age: 75
End: 2020-05-11

## 2020-05-13 ENCOUNTER — APPOINTMENT (OUTPATIENT)
Dept: CARDIAC REHAB | Facility: HOSPITAL | Age: 75
End: 2020-05-13

## 2020-05-15 ENCOUNTER — APPOINTMENT (OUTPATIENT)
Dept: CARDIAC REHAB | Facility: HOSPITAL | Age: 75
End: 2020-05-15

## 2020-05-18 ENCOUNTER — APPOINTMENT (OUTPATIENT)
Dept: CARDIAC REHAB | Facility: HOSPITAL | Age: 75
End: 2020-05-18

## 2020-05-20 ENCOUNTER — APPOINTMENT (OUTPATIENT)
Dept: CARDIAC REHAB | Facility: HOSPITAL | Age: 75
End: 2020-05-20

## 2020-05-22 ENCOUNTER — APPOINTMENT (OUTPATIENT)
Dept: CARDIAC REHAB | Facility: HOSPITAL | Age: 75
End: 2020-05-22

## 2020-05-27 ENCOUNTER — APPOINTMENT (OUTPATIENT)
Dept: CARDIAC REHAB | Facility: HOSPITAL | Age: 75
End: 2020-05-27

## 2020-05-29 ENCOUNTER — APPOINTMENT (OUTPATIENT)
Dept: CARDIAC REHAB | Facility: HOSPITAL | Age: 75
End: 2020-05-29

## 2020-06-01 ENCOUNTER — APPOINTMENT (OUTPATIENT)
Dept: CARDIAC REHAB | Facility: HOSPITAL | Age: 75
End: 2020-06-01

## 2020-06-03 ENCOUNTER — APPOINTMENT (OUTPATIENT)
Dept: CARDIAC REHAB | Facility: HOSPITAL | Age: 75
End: 2020-06-03

## 2020-06-05 ENCOUNTER — APPOINTMENT (OUTPATIENT)
Dept: CARDIAC REHAB | Facility: HOSPITAL | Age: 75
End: 2020-06-05

## 2020-06-08 ENCOUNTER — APPOINTMENT (OUTPATIENT)
Dept: CARDIAC REHAB | Facility: HOSPITAL | Age: 75
End: 2020-06-08

## 2020-06-10 ENCOUNTER — APPOINTMENT (OUTPATIENT)
Dept: CARDIAC REHAB | Facility: HOSPITAL | Age: 75
End: 2020-06-10

## 2020-06-12 ENCOUNTER — APPOINTMENT (OUTPATIENT)
Dept: CARDIAC REHAB | Facility: HOSPITAL | Age: 75
End: 2020-06-12

## 2020-06-15 ENCOUNTER — APPOINTMENT (OUTPATIENT)
Dept: CARDIAC REHAB | Facility: HOSPITAL | Age: 75
End: 2020-06-15

## 2020-06-17 ENCOUNTER — APPOINTMENT (OUTPATIENT)
Dept: CARDIAC REHAB | Facility: HOSPITAL | Age: 75
End: 2020-06-17

## 2020-06-19 ENCOUNTER — APPOINTMENT (OUTPATIENT)
Dept: CARDIAC REHAB | Facility: HOSPITAL | Age: 75
End: 2020-06-19

## 2020-06-22 ENCOUNTER — APPOINTMENT (OUTPATIENT)
Dept: CARDIAC REHAB | Facility: HOSPITAL | Age: 75
End: 2020-06-22

## 2020-06-24 ENCOUNTER — APPOINTMENT (OUTPATIENT)
Dept: CARDIAC REHAB | Facility: HOSPITAL | Age: 75
End: 2020-06-24

## 2020-06-26 ENCOUNTER — APPOINTMENT (OUTPATIENT)
Dept: CARDIAC REHAB | Facility: HOSPITAL | Age: 75
End: 2020-06-26

## 2020-06-29 ENCOUNTER — APPOINTMENT (OUTPATIENT)
Dept: CARDIAC REHAB | Facility: HOSPITAL | Age: 75
End: 2020-06-29

## 2020-09-09 ENCOUNTER — OFFICE VISIT (OUTPATIENT)
Dept: ORTHOPEDIC SURGERY | Facility: CLINIC | Age: 75
End: 2020-09-09

## 2020-09-09 VITALS — OXYGEN SATURATION: 99 % | HEIGHT: 63 IN | WEIGHT: 155 LBS | HEART RATE: 76 BPM | BODY MASS INDEX: 27.46 KG/M2

## 2020-09-09 DIAGNOSIS — M17.12 PRIMARY OSTEOARTHRITIS OF LEFT KNEE: Primary | ICD-10-CM

## 2020-09-09 PROCEDURE — 99213 OFFICE O/P EST LOW 20 MIN: CPT | Performed by: ORTHOPAEDIC SURGERY

## 2020-09-09 RX ORDER — MELOXICAM 7.5 MG/1
15 TABLET ORAL ONCE
Status: CANCELLED | OUTPATIENT
Start: 2020-09-09 | End: 2020-09-09

## 2020-09-09 RX ORDER — PREGABALIN 150 MG/1
150 CAPSULE ORAL ONCE
Status: CANCELLED | OUTPATIENT
Start: 2020-09-09 | End: 2020-09-09

## 2020-09-09 RX ORDER — EVOLOCUMAB 140 MG/ML
INJECTION, SOLUTION SUBCUTANEOUS
COMMUNITY
Start: 2020-08-19 | End: 2020-10-02 | Stop reason: HOSPADM

## 2020-09-09 RX ORDER — ACETAMINOPHEN 325 MG/1
1000 TABLET ORAL ONCE
Status: CANCELLED | OUTPATIENT
Start: 2020-09-09 | End: 2020-09-09

## 2020-09-09 NOTE — PROGRESS NOTES
[facility] [place] [facility] Note    Subjective     Chief Complaint   Patient presents with   • Follow-up     4 month follow up-Primary osteoarthritis of left knee, Orthovisc completed 03.2020        HPI    It has been 4  month(s) since Ms. Young's last visit. She returns to clinic today for follow-up of left knee pain. She rates her pain a 8/10 on the pain scale. Previous/current treatments: bracing, cane/walker, NSAIDS and weight loss. Current symptoms: pain, swelling, popping, grinding, stiffness and giving way/buckling. The pain is worse with walking, standing, climbing stairs, sleeping, working and leisure; resting, ice and heat improve the pain. Overall, she is doing the same.  She has reached a point where she would like to consider left total knee arthroplasty surgery.  She has exhausted conservative treatment options, with injections in the past as well as anti-inflammatories, bracing and weight loss.  She has had a history of a clot in 1 of her legs in the past.  She was on a blood thinner many years ago.  She has a heart condition, and sees Dr. Hardin.    I have reviewed the following portions of the patient's history and agree with: History of Present Illness and Review of Systems    Patient Active Problem List   Diagnosis   • Abnormal stress test   • Primary osteoarthritis of left knee     Past Medical History:   Diagnosis Date   • Arthritis    • Depression    • GERD (gastroesophageal reflux disease)    • Hyperlipidemia    • Hypertension    • Macular degeneration       Past Surgical History:   Procedure Laterality Date   • CARDIAC CATHETERIZATION     • CARDIAC CATHETERIZATION N/A 9/16/2019    Procedure: Left Heart Cath (73981);  Surgeon: Ronny Hardin MD;  Location: [facility] INVASIVE LOCATION;  Service: Cardiovascular   • CHOLECYSTECTOMY     • CORONARY ARTERY BYPASS GRAFT        History reviewed. No pertinent family history.  Social History     Socioeconomic History   • Marital  status:      Spouse name: Not on file   • Number of children: Not on file   • Years of education: Not on file   • Highest education level: Not on file   Tobacco Use   • Smoking status: Former Smoker     Last attempt to quit: 1988     Years since quittin.7   • Smokeless tobacco: Never Used   Substance and Sexual Activity   • Alcohol use: No   • Drug use: No   • Sexual activity: Defer      Current Outpatient Medications on File Prior to Visit   Medication Sig Dispense Refill   • aspirin 81 MG chewable tablet Chew 81 mg Daily.     • carvedilol (COREG) 3.125 MG tablet Take 1 tablet by mouth 2 (Two) Times a Day. 180 tablet 3   • Evolocumab (REPATHA SC) Inject  under the skin into the appropriate area as directed.     • FLUoxetine (PROzac) 20 MG capsule Take 20 mg by mouth Daily.     • meloxicam (MOBIC) 15 MG tablet TK 1 T PO QD PRN     • Multiple Vitamins-Minerals (PRESERVISION AREDS PO) PreserVision AREDS   one tablet daily     • NITROGLYCERIN PATCH      • pantoprazole (PROTONIX) 40 MG EC tablet Take 40 mg by mouth Daily.     • REPATHA SURECLICK 140 MG/ML solution auto-injector      • spironolactone (ALDACTONE) 25 MG tablet Take 1 tablet by mouth Daily. 30 tablet 11     No current facility-administered medications on file prior to visit.       Allergies   Allergen Reactions   • Lipitor [Atorvastatin] Myalgia   • Ezetimibe Myalgia   • Statins Myalgia        Review of Systems   Constitutional: Negative.    HENT: Positive for hearing loss, nosebleeds, postnasal drip, sinus pressure and sneezing.    Eyes: Positive for itching.   Respiratory: Positive for choking and wheezing.    Cardiovascular: Positive for palpitations.   Gastrointestinal: Negative.    Endocrine: Negative.    Genitourinary: Negative.    Musculoskeletal: Positive for arthralgias, back pain, joint swelling, neck pain and neck stiffness.   Skin: Negative.    Allergic/Immunologic: Negative.    Neurological: Positive for dizziness,  "light-headedness and numbness.   Hematological: Bruises/bleeds easily.   Psychiatric/Behavioral: Positive for decreased concentration.        Objective      Physical Exam  Pulse 76   Ht 160 cm (62.99\")   Wt 70.3 kg (155 lb)   SpO2 99%   BMI 27.46 kg/m²     Body mass index is 27.46 kg/m².    General:   Mental Status:  Alert   Appearance: Cooperative, in no acute distress   Build and Nutrition: Well-nourished well-developed female   Orientation: Alert and oriented to person, place and time   Posture: Normal   Gait: Limping on the left     Lower Extremities:              Left Knee:                          Tenderness:     Mild medial joint line tenderness, positive lateral joint line tenderness                          Effusion:          1+                          Swelling:          None                          Crepitus:          Positive                          Atrophy:           None                          Range of motion:        Extension:       0°                                                              Flexion:           130°  Instability:        No varus laxity, no valgus laxity, negative anterior drawer  Deformities:     None    Imaging/Studies  Imaging Results (Last 24 Hours)     Procedure Component Value Units Date/Time    XR Knee 4+ View Left [[id number]] Resulted:  09/09/20 1412     Updated:  09/09/20 1412    Narrative:       Left Knee Radiographs  Indication: left knee pain  Views: Standing AP's and skiers of both knees, with lateral and sunrise   views of the left knee    Comparison: 11/11/2019    Findings:   Bone-on-bone contact medial compartment, patellofemoral degeneration,   varus alignment, no acute bony abnormalities.  Mild worsening compared to   previous films.            Assessment and Plan     Alicia was seen today for follow-up.    Diagnoses and all orders for this visit:    Primary osteoarthritis of left knee  -     XR Knee 4+ View Left  -     Case Request; Standing  -     " Instructions on coughing, deep breathing, and incentive spirometry.; Future  -     CBC and Differential; Future  -     Basic metabolic panel; Future  -     Protime-INR; Future  -     APTT; Future  -     Sedimentation rate; Future  -     C-reactive protein; Future  -     Case Request    Other orders  -     Follow Anesthesia Guidelines / Standing Orders; Future  -     Obtain informed consent  -     Provide instructions to patient regarding NPO status  -     Chlorhexidine Skin Prep - Educate and Review With Patient; Future  -     Provide Patient With ERAS Hydration Instructions  -     Provide Patient With Enhanced Recovery Booklet(s) or Handout  -     Provide Instructions/Handout For Benzoyl Peroxide 5% Wash If Having Shoulder/Arm Surgery (If Prescribed)  -     Provide Instructions/Handout For Bactroban And Chlorhexidine Shower (If Prescribed)  -     Perform A Memory Screening On All Hip/Knee Replacement Patients >Or Equal To 65 Years Or Older  -     Complete A PROMIS And HOOS Or KOOS Survey If Having Hip Or Knee Replacement  -     Provide Patient With Carbo Loading Instructions  -     Provide Patient With ERAS Booklet(s)/Handout  -     mupirocin (BACTROBAN) 2 % ointment; Apply into the nostril(s) as directed by provider 2 (Two) Times a Day.  -     Chlorhexidine Gluconate 4 % solution; Shower with hibiclens solution as directed for 5 days prior to surgery        1. Primary osteoarthritis of left knee        I reviewed my findings with the patient today.  Her left knee pain has progressed to the point where she would like to seed with left total knee arthroplasty surgery.  We discussed the surgery in detail today, she understands risk, benefits, and alternatives.  Please see my counseling note for details.  She does have a heart condition, sees Dr. Hardin, and will require preoperative clearance.    Surgical Counseling     I have informed the patient of the diagnosis and the prognosis.  Exhaustive conservative  treatment modalities have not resulted in long term pain relief.  The symptoms have progressed to the point of daily pain and inability to perform activities of daily living without significant pain. The patient has reached the point of desiring to proceed with total knee arthroplasty after discussing the risks, benefits and alternatives to the procedure.  The surgical procedure itself was discussed in detail. Risks of the procedure were discussed, which included but are not limited to, bleeding, infection, damage to blood vessels and nerves, incomplete pain relief, loosening of the prosthesis (early or late), deep infection (early or late), need for further surgery, loss of limb, deep venous thrombosis, pulmonary embolus, death, heart attack, stroke, kidney failure, liver failure, and anesthetic complications.  In addition, the potential for deep infection developing in the future was discussed, which could require further surgery.  The knee would have to be re-opened, debrided, and potentially remove the prosthesis, which may or may not be replaced in the future.  Also, the possibility for loosening of the prosthesis has been mentioned.  If the prosthesis loosened, a revision arthroplasty could be performed, with results that are not as predictable compared to the original procedure.  The typical rehabilitative course has also been discussed, and full recovery may take up to a year to see the maximum benefit.  The importance of patient cooperation in the rehabilitative efforts has also been discussed.  No guarantees were given.  The patient understands the potential risks versus the benefits and desires to proceed with total knee arthroplasty at a mutually convenient time.    Return for surgery.    Medical Decision Making  Management Options : major surgery with risk factors  Data/Risk: radiology tests and independent visualization of imaging, lab tests, or EMG/NCV      Heladio Arroyo,  MD  09/09/20  14:13    Dragon disclaimer:  Much of this encounter note is an electronic transcription/translation of spoken language to printed text. The electronic translation of spoken language may permit erroneous, or at times, nonsensical words or phrases to be inadvertently transcribed; Although I have reviewed the note for such errors, some may still exist.

## 2020-09-17 ENCOUNTER — APPOINTMENT (OUTPATIENT)
Dept: PREADMISSION TESTING | Facility: HOSPITAL | Age: 75
End: 2020-09-17

## 2020-09-17 VITALS — BODY MASS INDEX: 27.1 KG/M2 | WEIGHT: 158.73 LBS | HEIGHT: 64 IN

## 2020-09-17 DIAGNOSIS — M17.12 PRIMARY OSTEOARTHRITIS OF LEFT KNEE: ICD-10-CM

## 2020-09-17 LAB
ALBUMIN SERPL-MCNC: 4.5 G/DL (ref 3.5–5.2)
ALBUMIN/GLOB SERPL: 2.4 G/DL
ALP SERPL-CCNC: 76 U/L (ref 39–117)
ALT SERPL W P-5'-P-CCNC: 13 U/L (ref 1–33)
ANION GAP SERPL CALCULATED.3IONS-SCNC: 8 MMOL/L (ref 5–15)
APTT PPP: 38.6 SECONDS (ref 24–37)
AST SERPL-CCNC: 22 U/L (ref 1–32)
BASOPHILS # BLD AUTO: 0.02 10*3/MM3 (ref 0–0.2)
BASOPHILS NFR BLD AUTO: 0.3 % (ref 0–1.5)
BILIRUB SERPL-MCNC: 0.6 MG/DL (ref 0–1.2)
BILIRUB UR QL STRIP: NEGATIVE
BUN SERPL-MCNC: 13 MG/DL (ref 8–23)
BUN/CREAT SERPL: 12.9 (ref 7–25)
CALCIUM SPEC-SCNC: 9.4 MG/DL (ref 8.6–10.5)
CHLORIDE SERPL-SCNC: 96 MMOL/L (ref 98–107)
CHOLEST SERPL-MCNC: 151 MG/DL (ref 0–200)
CLARITY UR: CLEAR
CO2 SERPL-SCNC: 30 MMOL/L (ref 22–29)
COLOR UR: YELLOW
CREAT SERPL-MCNC: 1.01 MG/DL (ref 0.57–1)
CRP SERPL-MCNC: 0.1 MG/DL (ref 0–0.5)
DEPRECATED RDW RBC AUTO: 45.4 FL (ref 37–54)
EOSINOPHIL # BLD AUTO: 0.19 10*3/MM3 (ref 0–0.4)
EOSINOPHIL NFR BLD AUTO: 3.2 % (ref 0.3–6.2)
ERYTHROCYTE [DISTWIDTH] IN BLOOD BY AUTOMATED COUNT: 12.8 % (ref 12.3–15.4)
ERYTHROCYTE [SEDIMENTATION RATE] IN BLOOD: 11 MM/HR (ref 0–30)
GFR SERPL CREATININE-BSD FRML MDRD: 54 ML/MIN/1.73
GLOBULIN UR ELPH-MCNC: 1.9 GM/DL
GLUCOSE SERPL-MCNC: 105 MG/DL (ref 65–99)
GLUCOSE UR STRIP-MCNC: NEGATIVE MG/DL
HBA1C MFR BLD: 5.6 % (ref 4.8–5.6)
HCT VFR BLD AUTO: 37.7 % (ref 34–46.6)
HDLC SERPL-MCNC: 64 MG/DL (ref 40–60)
HGB BLD-MCNC: 12.3 G/DL (ref 12–15.9)
HGB UR QL STRIP.AUTO: NEGATIVE
IMM GRANULOCYTES # BLD AUTO: 0.02 10*3/MM3 (ref 0–0.05)
IMM GRANULOCYTES NFR BLD AUTO: 0.3 % (ref 0–0.5)
INR PPP: 1.09 (ref 0.85–1.16)
KETONES UR QL STRIP: NEGATIVE
LDLC SERPL CALC-MCNC: 60 MG/DL (ref 0–100)
LDLC/HDLC SERPL: 0.94 {RATIO}
LEUKOCYTE ESTERASE UR QL STRIP.AUTO: NEGATIVE
LYMPHOCYTES # BLD AUTO: 1.41 10*3/MM3 (ref 0.7–3.1)
LYMPHOCYTES NFR BLD AUTO: 23.4 % (ref 19.6–45.3)
MCH RBC QN AUTO: 31.9 PG (ref 26.6–33)
MCHC RBC AUTO-ENTMCNC: 32.6 G/DL (ref 31.5–35.7)
MCV RBC AUTO: 97.7 FL (ref 79–97)
MONOCYTES # BLD AUTO: 0.67 10*3/MM3 (ref 0.1–0.9)
MONOCYTES NFR BLD AUTO: 11.1 % (ref 5–12)
NEUTROPHILS NFR BLD AUTO: 3.72 10*3/MM3 (ref 1.7–7)
NEUTROPHILS NFR BLD AUTO: 61.7 % (ref 42.7–76)
NITRITE UR QL STRIP: NEGATIVE
NRBC BLD AUTO-RTO: 0 /100 WBC (ref 0–0.2)
PH UR STRIP.AUTO: 6 [PH] (ref 5–8)
PLATELET # BLD AUTO: 187 10*3/MM3 (ref 140–450)
PMV BLD AUTO: 11.2 FL (ref 6–12)
POTASSIUM SERPL-SCNC: 4.7 MMOL/L (ref 3.5–5.2)
PROT SERPL-MCNC: 6.4 G/DL (ref 6–8.5)
PROT UR QL STRIP: NEGATIVE
PROTHROMBIN TIME: 13.8 SECONDS (ref 11.5–14)
RBC # BLD AUTO: 3.86 10*6/MM3 (ref 3.77–5.28)
SODIUM SERPL-SCNC: 134 MMOL/L (ref 136–145)
SP GR UR STRIP: <=1.005 (ref 1–1.03)
TRIGL SERPL-MCNC: 133 MG/DL (ref 0–150)
UROBILINOGEN UR QL STRIP: NORMAL
VLDLC SERPL-MCNC: 26.6 MG/DL
WBC # BLD AUTO: 6.03 10*3/MM3 (ref 3.4–10.8)

## 2020-09-17 PROCEDURE — 80061 LIPID PANEL: CPT | Performed by: INTERNAL MEDICINE

## 2020-09-17 PROCEDURE — 85025 COMPLETE CBC W/AUTO DIFF WBC: CPT | Performed by: ORTHOPAEDIC SURGERY

## 2020-09-17 PROCEDURE — 83036 HEMOGLOBIN GLYCOSYLATED A1C: CPT | Performed by: ORTHOPAEDIC SURGERY

## 2020-09-17 PROCEDURE — 86140 C-REACTIVE PROTEIN: CPT | Performed by: ORTHOPAEDIC SURGERY

## 2020-09-17 PROCEDURE — 81003 URINALYSIS AUTO W/O SCOPE: CPT | Performed by: ORTHOPAEDIC SURGERY

## 2020-09-17 PROCEDURE — 93010 ELECTROCARDIOGRAM REPORT: CPT | Performed by: INTERNAL MEDICINE

## 2020-09-17 PROCEDURE — 85730 THROMBOPLASTIN TIME PARTIAL: CPT | Performed by: ORTHOPAEDIC SURGERY

## 2020-09-17 PROCEDURE — 93005 ELECTROCARDIOGRAM TRACING: CPT

## 2020-09-17 PROCEDURE — 85652 RBC SED RATE AUTOMATED: CPT | Performed by: ORTHOPAEDIC SURGERY

## 2020-09-17 PROCEDURE — 85610 PROTHROMBIN TIME: CPT | Performed by: ORTHOPAEDIC SURGERY

## 2020-09-17 PROCEDURE — 36415 COLL VENOUS BLD VENIPUNCTURE: CPT

## 2020-09-17 PROCEDURE — 80053 COMPREHEN METABOLIC PANEL: CPT | Performed by: INTERNAL MEDICINE

## 2020-09-17 ASSESSMENT — KOOS JR
KOOS JR SCORE: 15
KOOS JR SCORE: 50.012

## 2020-09-17 NOTE — PAT
Patient to apply Chlorhexadine wipes  to surgical area (as instructed) the night before procedure and the AM of procedure. Wipes provided.  Patient instructed to drink 20 ounces (or until full) of Gatorade and it needs to be completed 1 hour before given arrival time for procedure (NO RED Gatorade)    Patient verbalized understanding.  Patient did the one on one joint replacement class in [facility].  Memory screen in PAT passed with no problems-documents on chart.  Verified with patient that [facility] is sending  a prescription for Bactroban and Chlorhexidine shower from the office.  Reinforced with them to fill the prescription if they haven't already.  Verbal and written instructions given regarding proper use of the Bactroban and Chlorhexidine to patient and/or famlily during PAT visit. Patient/family also instructed to complete checklist and return it to Pre-op on the day of surgery.  Patient and/or family verbalized understanding.

## 2020-09-27 ENCOUNTER — APPOINTMENT (OUTPATIENT)
Dept: PREADMISSION TESTING | Facility: HOSPITAL | Age: 75
End: 2020-09-27

## 2020-09-27 PROCEDURE — C9803 HOPD COVID-19 SPEC COLLECT: HCPCS

## 2020-09-27 PROCEDURE — U0004 COV-19 TEST NON-CDC HGH THRU: HCPCS

## 2020-09-28 LAB — SARS-COV-2 RNA NOSE QL NAA+PROBE: NOT DETECTED

## 2020-09-29 ENCOUNTER — ANESTHESIA (OUTPATIENT)
Dept: PERIOP | Facility: HOSPITAL | Age: 75
End: 2020-09-29

## 2020-09-29 ENCOUNTER — HOSPITAL ENCOUNTER (OUTPATIENT)
Facility: HOSPITAL | Age: 75
Discharge: HOME | End: 2020-10-02
Attending: ORTHOPAEDIC SURGERY | Admitting: ORTHOPAEDIC SURGERY

## 2020-09-29 ENCOUNTER — ANESTHESIA EVENT (OUTPATIENT)
Dept: PERIOP | Facility: HOSPITAL | Age: 75
End: 2020-09-29

## 2020-09-29 ENCOUNTER — APPOINTMENT (OUTPATIENT)
Dept: GENERAL RADIOLOGY | Facility: HOSPITAL | Age: 75
End: 2020-09-29

## 2020-09-29 DIAGNOSIS — M17.12 PRIMARY OSTEOARTHRITIS OF LEFT KNEE: ICD-10-CM

## 2020-09-29 DIAGNOSIS — Z96.652 STATUS POST TOTAL LEFT KNEE REPLACEMENT: Primary | ICD-10-CM

## 2020-09-29 PROBLEM — I50.9 CHF (CONGESTIVE HEART FAILURE) (HCC): Status: ACTIVE | Noted: 2020-09-29

## 2020-09-29 PROBLEM — I10 HTN (HYPERTENSION): Status: ACTIVE | Noted: 2020-09-29

## 2020-09-29 PROBLEM — E78.5 HYPERLIPIDEMIA: Status: ACTIVE | Noted: 2020-09-29

## 2020-09-29 LAB
ANION GAP SERPL CALCULATED.3IONS-SCNC: 9 MMOL/L (ref 5–15)
BUN SERPL-MCNC: 6 MG/DL (ref 8–23)
BUN/CREAT SERPL: 8.3 (ref 7–25)
CALCIUM SPEC-SCNC: 9.2 MG/DL (ref 8.6–10.5)
CHLORIDE SERPL-SCNC: 104 MMOL/L (ref 98–107)
CO2 SERPL-SCNC: 27 MMOL/L (ref 22–29)
CREAT SERPL-MCNC: 0.72 MG/DL (ref 0.57–1)
GFR SERPL CREATININE-BSD FRML MDRD: 79 ML/MIN/1.73
GLUCOSE SERPL-MCNC: 80 MG/DL (ref 65–99)
POTASSIUM SERPL-SCNC: 3.5 MMOL/L (ref 3.5–5.2)
SODIUM SERPL-SCNC: 140 MMOL/L (ref 136–145)

## 2020-09-29 PROCEDURE — A9270 NON-COVERED ITEM OR SERVICE: HCPCS | Performed by: NURSE PRACTITIONER

## 2020-09-29 PROCEDURE — 63710000001 ACETAMINOPHEN 325 MG TABLET: Performed by: ORTHOPAEDIC SURGERY

## 2020-09-29 PROCEDURE — 25010000003 CEFAZOLIN IN DEXTROSE 2-4 GM/100ML-% SOLUTION: Performed by: ORTHOPAEDIC SURGERY

## 2020-09-29 PROCEDURE — 97161 PT EVAL LOW COMPLEX 20 MIN: CPT

## 2020-09-29 PROCEDURE — 63710000001 FLUOXETINE 20 MG CAPSULE: Performed by: NURSE PRACTITIONER

## 2020-09-29 PROCEDURE — 25010000002 DEXAMETHASONE PER 1 MG: Performed by: NURSE ANESTHETIST, CERTIFIED REGISTERED

## 2020-09-29 PROCEDURE — 25010000002 ONDANSETRON PER 1 MG: Performed by: NURSE ANESTHETIST, CERTIFIED REGISTERED

## 2020-09-29 PROCEDURE — A9270 NON-COVERED ITEM OR SERVICE: HCPCS | Performed by: ORTHOPAEDIC SURGERY

## 2020-09-29 PROCEDURE — C1713 ANCHOR/SCREW BN/BN,TIS/BN: HCPCS | Performed by: ORTHOPAEDIC SURGERY

## 2020-09-29 PROCEDURE — S0260 H&P FOR SURGERY: HCPCS | Performed by: ORTHOPAEDIC SURGERY

## 2020-09-29 PROCEDURE — 63710000001 MUPIROCIN 2 % OINTMENT 1 G TUBE: Performed by: ORTHOPAEDIC SURGERY

## 2020-09-29 PROCEDURE — 25010000002 PROPOFOL 10 MG/ML EMULSION: Performed by: NURSE ANESTHETIST, CERTIFIED REGISTERED

## 2020-09-29 PROCEDURE — 80048 BASIC METABOLIC PNL TOTAL CA: CPT | Performed by: ANESTHESIOLOGY

## 2020-09-29 PROCEDURE — 27447 TOTAL KNEE ARTHROPLASTY: CPT | Performed by: PHYSICIAN ASSISTANT

## 2020-09-29 PROCEDURE — 63710000001 PREGABALIN 150 MG CAPSULE: Performed by: ORTHOPAEDIC SURGERY

## 2020-09-29 PROCEDURE — 63710000001 MELOXICAM 15 MG TABLET: Performed by: ORTHOPAEDIC SURGERY

## 2020-09-29 PROCEDURE — C1776 JOINT DEVICE (IMPLANTABLE): HCPCS | Performed by: ORTHOPAEDIC SURGERY

## 2020-09-29 PROCEDURE — 63710000001 CARVEDILOL 3.125 MG TABLET: Performed by: NURSE PRACTITIONER

## 2020-09-29 PROCEDURE — 73560 X-RAY EXAM OF KNEE 1 OR 2: CPT

## 2020-09-29 PROCEDURE — 63710000001 ACETAMINOPHEN 500 MG TABLET: Performed by: ORTHOPAEDIC SURGERY

## 2020-09-29 PROCEDURE — 25010000002 ROPIVACAINE PER 1 MG: Performed by: ORTHOPAEDIC SURGERY

## 2020-09-29 PROCEDURE — 25010000002 ROPIVACAINE PER 1 MG: Performed by: NURSE ANESTHETIST, CERTIFIED REGISTERED

## 2020-09-29 PROCEDURE — 27447 TOTAL KNEE ARTHROPLASTY: CPT | Performed by: ORTHOPAEDIC SURGERY

## 2020-09-29 PROCEDURE — 63710000001 PANTOPRAZOLE 40 MG TABLET DELAYED-RELEASE: Performed by: NURSE PRACTITIONER

## 2020-09-29 DEVICE — GEN II 7.5MM RESUR PAT 32MM
Type: IMPLANTABLE DEVICE | Site: KNEE | Status: FUNCTIONAL
Brand: GENESIS II

## 2020-09-29 DEVICE — GENESIS II NON-POROUS TIBIAL                                    BASEPLATE SIZE 3 LEFT
Type: IMPLANTABLE DEVICE | Site: KNEE | Status: FUNCTIONAL
Brand: GENESIS II

## 2020-09-29 DEVICE — LEGION CRUCIATE RETAINING OXINIUM                                    FEMORAL SIZE 4 LEFT
Type: IMPLANTABLE DEVICE | Site: KNEE | Status: FUNCTIONAL
Brand: LEGION

## 2020-09-29 DEVICE — IMPLANTABLE DEVICE: Type: IMPLANTABLE DEVICE | Site: KNEE | Status: FUNCTIONAL

## 2020-09-29 DEVICE — LEGION CRUCIATE RETAINING HIGH                                    FLEX HIGHLY CROSS LINKED                                    POLYETHYLENE SIZE 3-4 11MM
Type: IMPLANTABLE DEVICE | Site: KNEE | Status: FUNCTIONAL
Brand: LEGION

## 2020-09-29 RX ORDER — MIDAZOLAM HYDROCHLORIDE 1 MG/ML
1 INJECTION INTRAMUSCULAR; INTRAVENOUS
Status: DISCONTINUED | OUTPATIENT
Start: 2020-09-29 | End: 2020-09-29 | Stop reason: HOSPADM

## 2020-09-29 RX ORDER — PANTOPRAZOLE SODIUM 40 MG/1
40 TABLET, DELAYED RELEASE ORAL 2 TIMES DAILY
Status: DISCONTINUED | OUTPATIENT
Start: 2020-09-29 | End: 2020-10-02 | Stop reason: HOSPADM

## 2020-09-29 RX ORDER — ACETAMINOPHEN 325 MG/1
650 TABLET ORAL EVERY 4 HOURS PRN
Status: DISCONTINUED | OUTPATIENT
Start: 2020-09-29 | End: 2020-10-02 | Stop reason: HOSPADM

## 2020-09-29 RX ORDER — FENTANYL CITRATE 50 UG/ML
50 INJECTION, SOLUTION INTRAMUSCULAR; INTRAVENOUS
Status: DISCONTINUED | OUTPATIENT
Start: 2020-09-29 | End: 2020-09-29 | Stop reason: HOSPADM

## 2020-09-29 RX ORDER — BUPIVACAINE HYDROCHLORIDE 5 MG/ML
INJECTION, SOLUTION PERINEURAL
Status: COMPLETED | OUTPATIENT
Start: 2020-09-29 | End: 2020-09-29

## 2020-09-29 RX ORDER — MELOXICAM 7.5 MG/1
15 TABLET ORAL DAILY
Status: DISCONTINUED | OUTPATIENT
Start: 2020-09-30 | End: 2020-10-02 | Stop reason: HOSPADM

## 2020-09-29 RX ORDER — ROPIVACAINE HYDROCHLORIDE 5 MG/ML
INJECTION, SOLUTION EPIDURAL; INFILTRATION; PERINEURAL AS NEEDED
Status: DISCONTINUED | OUTPATIENT
Start: 2020-09-29 | End: 2020-09-29 | Stop reason: HOSPADM

## 2020-09-29 RX ORDER — ONDANSETRON 4 MG/1
4 TABLET, FILM COATED ORAL EVERY 6 HOURS PRN
Status: DISCONTINUED | OUTPATIENT
Start: 2020-09-29 | End: 2020-10-02 | Stop reason: HOSPADM

## 2020-09-29 RX ORDER — SODIUM CHLORIDE, SODIUM LACTATE, POTASSIUM CHLORIDE, CALCIUM CHLORIDE 600; 310; 30; 20 MG/100ML; MG/100ML; MG/100ML; MG/100ML
9 INJECTION, SOLUTION INTRAVENOUS CONTINUOUS PRN
Status: DISCONTINUED | OUTPATIENT
Start: 2020-09-29 | End: 2020-09-29 | Stop reason: HOSPADM

## 2020-09-29 RX ORDER — LIDOCAINE HYDROCHLORIDE 10 MG/ML
0.5 INJECTION, SOLUTION EPIDURAL; INFILTRATION; INTRACAUDAL; PERINEURAL ONCE AS NEEDED
Status: COMPLETED | OUTPATIENT
Start: 2020-09-29 | End: 2020-09-29

## 2020-09-29 RX ORDER — LIDOCAINE HYDROCHLORIDE 10 MG/ML
INJECTION, SOLUTION EPIDURAL; INFILTRATION; INTRACAUDAL; PERINEURAL AS NEEDED
Status: DISCONTINUED | OUTPATIENT
Start: 2020-09-29 | End: 2020-09-29 | Stop reason: SURG

## 2020-09-29 RX ORDER — NALOXONE HCL 0.4 MG/ML
0.4 VIAL (ML) INJECTION
Status: DISCONTINUED | OUTPATIENT
Start: 2020-09-29 | End: 2020-10-02 | Stop reason: HOSPADM

## 2020-09-29 RX ORDER — OXYCODONE HYDROCHLORIDE 5 MG/1
5 TABLET ORAL EVERY 4 HOURS PRN
Status: DISCONTINUED | OUTPATIENT
Start: 2020-09-29 | End: 2020-10-02 | Stop reason: HOSPADM

## 2020-09-29 RX ORDER — SODIUM CHLORIDE 0.9 % (FLUSH) 0.9 %
10 SYRINGE (ML) INJECTION EVERY 12 HOURS SCHEDULED
Status: DISCONTINUED | OUTPATIENT
Start: 2020-09-29 | End: 2020-09-29 | Stop reason: HOSPADM

## 2020-09-29 RX ORDER — PREGABALIN 150 MG/1
150 CAPSULE ORAL ONCE
Status: COMPLETED | OUTPATIENT
Start: 2020-09-29 | End: 2020-09-29

## 2020-09-29 RX ORDER — CEFAZOLIN SODIUM 2 G/100ML
2 INJECTION, SOLUTION INTRAVENOUS EVERY 8 HOURS
Status: COMPLETED | OUTPATIENT
Start: 2020-09-29 | End: 2020-09-30

## 2020-09-29 RX ORDER — ASPIRIN 325 MG
325 TABLET, DELAYED RELEASE (ENTERIC COATED) ORAL DAILY
Status: DISCONTINUED | OUTPATIENT
Start: 2020-09-30 | End: 2020-09-29

## 2020-09-29 RX ORDER — SODIUM CHLORIDE 0.9 % (FLUSH) 0.9 %
10 SYRINGE (ML) INJECTION AS NEEDED
Status: DISCONTINUED | OUTPATIENT
Start: 2020-09-29 | End: 2020-09-29 | Stop reason: HOSPADM

## 2020-09-29 RX ORDER — LABETALOL HYDROCHLORIDE 5 MG/ML
10 INJECTION, SOLUTION INTRAVENOUS EVERY 4 HOURS PRN
Status: DISCONTINUED | OUTPATIENT
Start: 2020-09-29 | End: 2020-10-02 | Stop reason: HOSPADM

## 2020-09-29 RX ORDER — HYDROMORPHONE HYDROCHLORIDE 1 MG/ML
0.5 INJECTION, SOLUTION INTRAMUSCULAR; INTRAVENOUS; SUBCUTANEOUS
Status: DISCONTINUED | OUTPATIENT
Start: 2020-09-29 | End: 2020-10-02 | Stop reason: HOSPADM

## 2020-09-29 RX ORDER — CARVEDILOL 3.12 MG/1
3.12 TABLET ORAL 2 TIMES DAILY
Status: DISCONTINUED | OUTPATIENT
Start: 2020-09-29 | End: 2020-10-02 | Stop reason: HOSPADM

## 2020-09-29 RX ORDER — MAGNESIUM HYDROXIDE 1200 MG/15ML
LIQUID ORAL AS NEEDED
Status: DISCONTINUED | OUTPATIENT
Start: 2020-09-29 | End: 2020-09-29 | Stop reason: HOSPADM

## 2020-09-29 RX ORDER — ACETAMINOPHEN 500 MG
1000 TABLET ORAL ONCE
Status: COMPLETED | OUTPATIENT
Start: 2020-09-29 | End: 2020-09-29

## 2020-09-29 RX ORDER — ONDANSETRON 2 MG/ML
4 INJECTION INTRAMUSCULAR; INTRAVENOUS EVERY 6 HOURS PRN
Status: DISCONTINUED | OUTPATIENT
Start: 2020-09-29 | End: 2020-10-02 | Stop reason: HOSPADM

## 2020-09-29 RX ORDER — NALOXONE HCL 0.4 MG/ML
0.1 VIAL (ML) INJECTION
Status: DISCONTINUED | OUTPATIENT
Start: 2020-09-29 | End: 2020-10-02 | Stop reason: HOSPADM

## 2020-09-29 RX ORDER — SODIUM CHLORIDE 0.9 % (FLUSH) 0.9 %
3 SYRINGE (ML) INJECTION EVERY 12 HOURS SCHEDULED
Status: DISCONTINUED | OUTPATIENT
Start: 2020-09-29 | End: 2020-10-02 | Stop reason: HOSPADM

## 2020-09-29 RX ORDER — CEFAZOLIN SODIUM 2 G/100ML
2 INJECTION, SOLUTION INTRAVENOUS ONCE
Status: COMPLETED | OUTPATIENT
Start: 2020-09-29 | End: 2020-09-29

## 2020-09-29 RX ORDER — ACETAMINOPHEN 650 MG/1
650 SUPPOSITORY RECTAL EVERY 4 HOURS PRN
Status: DISCONTINUED | OUTPATIENT
Start: 2020-09-29 | End: 2020-10-02 | Stop reason: HOSPADM

## 2020-09-29 RX ORDER — BUPIVACAINE HYDROCHLORIDE 2.5 MG/ML
INJECTION, SOLUTION EPIDURAL; INFILTRATION; INTRACAUDAL
Status: COMPLETED | OUTPATIENT
Start: 2020-09-29 | End: 2020-09-29

## 2020-09-29 RX ORDER — SODIUM CHLORIDE 0.9 % (FLUSH) 0.9 %
1-10 SYRINGE (ML) INJECTION AS NEEDED
Status: DISCONTINUED | OUTPATIENT
Start: 2020-09-29 | End: 2020-10-02 | Stop reason: HOSPADM

## 2020-09-29 RX ORDER — ONDANSETRON 2 MG/ML
INJECTION INTRAMUSCULAR; INTRAVENOUS AS NEEDED
Status: DISCONTINUED | OUTPATIENT
Start: 2020-09-29 | End: 2020-09-29 | Stop reason: SURG

## 2020-09-29 RX ORDER — FLUOXETINE HYDROCHLORIDE 20 MG/1
20 CAPSULE ORAL DAILY
Status: DISCONTINUED | OUTPATIENT
Start: 2020-09-29 | End: 2020-10-02 | Stop reason: HOSPADM

## 2020-09-29 RX ORDER — SODIUM CHLORIDE 9 MG/ML
120 INJECTION, SOLUTION INTRAVENOUS CONTINUOUS
Status: DISCONTINUED | OUTPATIENT
Start: 2020-09-29 | End: 2020-10-02 | Stop reason: HOSPADM

## 2020-09-29 RX ORDER — DEXAMETHASONE SODIUM PHOSPHATE 4 MG/ML
INJECTION, SOLUTION INTRA-ARTICULAR; INTRALESIONAL; INTRAMUSCULAR; INTRAVENOUS; SOFT TISSUE AS NEEDED
Status: DISCONTINUED | OUTPATIENT
Start: 2020-09-29 | End: 2020-09-29 | Stop reason: SURG

## 2020-09-29 RX ORDER — FAMOTIDINE 20 MG/1
20 TABLET, FILM COATED ORAL
Status: DISCONTINUED | OUTPATIENT
Start: 2020-09-29 | End: 2020-09-29 | Stop reason: HOSPADM

## 2020-09-29 RX ORDER — HYDROMORPHONE HYDROCHLORIDE 1 MG/ML
0.5 INJECTION, SOLUTION INTRAMUSCULAR; INTRAVENOUS; SUBCUTANEOUS
Status: DISCONTINUED | OUTPATIENT
Start: 2020-09-29 | End: 2020-09-29 | Stop reason: HOSPADM

## 2020-09-29 RX ORDER — MELOXICAM 15 MG/1
15 TABLET ORAL ONCE
Status: COMPLETED | OUTPATIENT
Start: 2020-09-29 | End: 2020-09-29

## 2020-09-29 RX ORDER — PROPOFOL 10 MG/ML
VIAL (ML) INTRAVENOUS AS NEEDED
Status: DISCONTINUED | OUTPATIENT
Start: 2020-09-29 | End: 2020-09-29 | Stop reason: SURG

## 2020-09-29 RX ORDER — MORPHINE SULFATE 4 MG/ML
4 INJECTION, SOLUTION INTRAMUSCULAR; INTRAVENOUS
Status: DISCONTINUED | OUTPATIENT
Start: 2020-09-29 | End: 2020-10-02 | Stop reason: HOSPADM

## 2020-09-29 RX ADMIN — FLUOXETINE HYDROCHLORIDE 20 MG: 20 CAPSULE ORAL at 16:10

## 2020-09-29 RX ADMIN — TRANEXAMIC ACID 1000 MG: 100 INJECTION, SOLUTION INTRAVENOUS at 11:31

## 2020-09-29 RX ADMIN — CEFAZOLIN SODIUM 2 G: 2 INJECTION, SOLUTION INTRAVENOUS at 17:22

## 2020-09-29 RX ADMIN — ACETAMINOPHEN 650 MG: 325 TABLET, FILM COATED ORAL at 17:32

## 2020-09-29 RX ADMIN — PROPOFOL 40 MG: 10 INJECTION, EMULSION INTRAVENOUS at 09:48

## 2020-09-29 RX ADMIN — PROPOFOL 50 MCG/KG/MIN: 10 INJECTION, EMULSION INTRAVENOUS at 09:59

## 2020-09-29 RX ADMIN — CEFAZOLIN SODIUM 2 G: 2 INJECTION, SOLUTION INTRAVENOUS at 09:45

## 2020-09-29 RX ADMIN — LIDOCAINE HYDROCHLORIDE 0.5 ML: 10 INJECTION, SOLUTION EPIDURAL; INFILTRATION; INTRACAUDAL; PERINEURAL at 08:13

## 2020-09-29 RX ADMIN — LIDOCAINE HYDROCHLORIDE 50 MG: 10 INJECTION, SOLUTION EPIDURAL; INFILTRATION; INTRACAUDAL; PERINEURAL at 09:48

## 2020-09-29 RX ADMIN — PANTOPRAZOLE SODIUM 40 MG: 40 TABLET, DELAYED RELEASE ORAL at 16:10

## 2020-09-29 RX ADMIN — CARVEDILOL 3.12 MG: 3.12 TABLET, FILM COATED ORAL at 19:33

## 2020-09-29 RX ADMIN — MELOXICAM 15 MG: 15 TABLET ORAL at 08:11

## 2020-09-29 RX ADMIN — TRANEXAMIC ACID 1000 MG: 100 INJECTION, SOLUTION INTRAVENOUS at 10:02

## 2020-09-29 RX ADMIN — SODIUM CHLORIDE, POTASSIUM CHLORIDE, SODIUM LACTATE AND CALCIUM CHLORIDE 9 ML/HR: 600; 310; 30; 20 INJECTION, SOLUTION INTRAVENOUS at 08:14

## 2020-09-29 RX ADMIN — ROPIVACAINE HYDROCHLORIDE 10 ML/HR: 5 INJECTION, SOLUTION EPIDURAL; INFILTRATION; PERINEURAL at 12:49

## 2020-09-29 RX ADMIN — MUPIROCIN 1 APPLICATION: 20 OINTMENT TOPICAL at 08:13

## 2020-09-29 RX ADMIN — BUPIVACAINE HYDROCHLORIDE 30 ML: 2.5 INJECTION, SOLUTION EPIDURAL; INFILTRATION; INTRACAUDAL; PERINEURAL at 12:43

## 2020-09-29 RX ADMIN — ONDANSETRON 4 MG: 2 INJECTION INTRAMUSCULAR; INTRAVENOUS at 12:12

## 2020-09-29 RX ADMIN — DEXAMETHASONE SODIUM PHOSPHATE 8 MG: 4 INJECTION, SOLUTION INTRAMUSCULAR; INTRAVENOUS at 11:02

## 2020-09-29 RX ADMIN — SODIUM CHLORIDE 120 ML/HR: 9 INJECTION, SOLUTION INTRAVENOUS at 14:43

## 2020-09-29 RX ADMIN — BUPIVACAINE HYDROCHLORIDE 2 ML: 5 INJECTION, SOLUTION PERINEURAL at 09:50

## 2020-09-29 RX ADMIN — ACETAMINOPHEN 1000 MG: 500 TABLET ORAL at 08:11

## 2020-09-29 RX ADMIN — PREGABALIN 150 MG: 150 CAPSULE ORAL at 08:11

## 2020-09-29 RX ADMIN — PROPOFOL 40 MG: 10 INJECTION, EMULSION INTRAVENOUS at 09:56

## 2020-09-29 RX ADMIN — CEFAZOLIN SODIUM 1 G: 2 INJECTION, SOLUTION INTRAVENOUS at 10:59

## 2020-09-29 NOTE — ANESTHESIA PREPROCEDURE EVALUATION
Anesthesia Evaluation     Patient summary reviewed and Nursing notes reviewed   no history of anesthetic complications:  NPO Solid Status: > 8 hours  NPO Liquid Status: > 2 hours           Airway   Mallampati: I  TM distance: >3 FB  Neck ROM: full  No difficulty expected  Dental - normal exam     Pulmonary    (+) decreased breath sounds,   Cardiovascular   Exercise tolerance: poor (<4 METS)    Rhythm: regular  Rate: normal    (+) hypertension well controlled less than 2 medications, CABG >6 Months, CHF Systolic <55%, hyperlipidemia,       Neuro/Psych  (+) psychiatric history Depression,     GI/Hepatic/Renal/Endo    (+)  GERD well controlled,      Musculoskeletal     Abdominal   (+) obese,     Abdomen: soft.   Substance History      OB/GYN          Other   arthritis,                      Anesthesia Plan    ASA 3     spinal and regional     intravenous induction     Anesthetic plan, all risks, benefits, and alternatives have been provided, discussed and informed consent has been obtained with: patient.    Plan discussed with CRNA.

## 2020-09-29 NOTE — ANESTHESIA POSTPROCEDURE EVALUATION
Patient: Alicia Young    Procedure Summary     Date: 09/29/20 Room / [facility]:  [facility]    Anesthesia Start: 0943 Anesthesia Stop:     Procedure: TOTAL KNEE ARTHROPLASTY LEFT (Left Knee) Diagnosis:       Primary osteoarthritis of left knee      (Primary osteoarthritis of left knee [M17.12])    Surgeon: Heladio Arroyo MD Provider: Kenneth Stallings MD    Anesthesia Type: spinal, regional ASA Status: 3          Anesthesia Type: spinal, regional    Vitals  Vitals Value Taken Time   /54 09/29/20 1245   Temp 97.3 °F (36.3 °C) 09/29/20 1237   Pulse 62 09/29/20 1251   Resp 15 09/29/20 1245   SpO2 100 % 09/29/20 1251   Vitals shown include unvalidated device data.        Post Anesthesia Care and Evaluation    Patient location during evaluation: PACU  Patient participation: complete - patient participated  Level of consciousness: responsive to verbal stimuli  Pain score: 0  Pain management: adequate  Airway patency: patent  Anesthetic complications: No anesthetic complications  PONV Status: none  Cardiovascular status: stable  Respiratory status: acceptable and spontaneous ventilation (Partial nonrebreatheter)  Hydration status: stable    Comments: Pt transferred to PACU with O2. Vital signs stable. Report to PACU RN and care accepted.

## 2020-09-29 NOTE — ANESTHESIA PROCEDURE NOTES
Spinal Block      Patient reassessed immediately prior to procedure    Patient location during procedure: OR  Indication:at surgeon's request  Performed By  CRNA: Sridevi Montenegro CRNA  Preanesthetic Checklist  Completed: patient identified, site marked, surgical consent, pre-op evaluation, timeout performed, IV checked, risks and benefits discussed and monitors and equipment checked  Spinal Block Prep:  Patient Position:sitting  Sterile Tech:cap, gloves, sterile barriers and mask  Prep:Chloraprep  Patient Monitoring:continuous pulse oximetry  Spinal Block Procedure  Approach:midline  Guidance:landmark technique and palpation technique  Location:L4-L5  Needle Type:Quincke  Needle Gauge:22 G  Placement of Spinal needle event:cerebrospinal fluid aspirated  Paresthesia: no  Fluid Appearance:clear  Medications: bupivacaine (MARCAINE) 0.5 % injection, 2 mL  Med Administered at 9/29/2020 9:50 AM   Post Assessment  Patient Tolerance:patient tolerated the procedure well with no apparent complications  Complications no  Additional Notes  Procedure:  Pt assisted to sitting position, with legs in position of comfort over side of bed.  Pt. instructed in optimal spine presentation, the spine was prepped/ Draped and the skin at insertion site was anesthetized with 1% Lidocaine 2 ml.  The spinal needle was then advanced until CSF flow was obtained and LA was injected:

## 2020-09-29 NOTE — ANESTHESIA PROCEDURE NOTES
Left Adductor Canal Catheter      Patient reassessed immediately prior to procedure    Patient location during procedure: post-op  Reason for block: at surgeon's request and post-op pain management  Performed by  CRNA: rSidevi Montenegro CRNA  Assisted by: Elizabeth Crews RN  Preanesthetic Checklist  Completed: patient identified, site marked, surgical consent, pre-op evaluation, timeout performed, IV checked, risks and benefits discussed and monitors and equipment checked  Prep:  Pt Position: supine  Sterile barriers:cap, gloves, mask and sterile barriers  Prep: ChloraPrep  Patient monitoring: blood pressure monitoring, continuous pulse oximetry and EKG  Procedure  Performed under: spinal  Guidance:ultrasound guided  Images:still images obtained, printed/placed on chart    Laterality:left  Block Type:adductor canal block  Injection Technique:catheter  Needle Type:Tuohy and echogenic  Needle Gauge:18 G  Resistance on Injection: none  Catheter Size:20 G (20g)  Cath Depth at skin: 9 cm    Medications Used: bupivacaine PF (MARCAINE) 0.25 % injection, 30 mL  Med admintered at 9/29/2020 12:43 PM      Post Assessment  Injection Assessment: negative aspiration for heme, incremental injection and no paresthesia on injection  Patient Tolerance:comfortable throughout block  Complications:no  Additional Notes  Procedure:             The pt was placed in the Supine position.  The Insertion site was  prepped and Draped in sterile fashion.  The pt was anesthetized with  IV Sedation( see meds).  Skin and cutaneous tissue was infiltrated and anesthetized with 1% Lidocaine 3 mls via a 25g needle.  A BBraun 4 inch 18g echogenic needle was then  inserted approximately midline, mid-thigh and advanced In-plane with Ultrasound guidance.  Normal Saline PSF was utilized for hydrodissection of tissue.  The Vastus medialis and Sartorius muscle where visualized and the needle tip was placed in the adductor canal,  lateral to the femoral  artery.  LA injection spread was visualized, injection was incremental 1-5ml, injection pressure was normal or little, no intraneural injection, no vascular injection.  LA dose was injected thru the needle(see dose above).  A BBraun 20g wire stylet catheter was placed via the needle with ultrasound visualization and confirmation with NS fluid bolus. The labeled Catheter was then secured to skin at insertion site with skin afix and steristrips to curled catheter and CHG transparent dressing.  Thank you.

## 2020-09-30 PROBLEM — D72.829 LEUKOCYTOSIS: Status: ACTIVE | Noted: 2020-09-30

## 2020-09-30 PROBLEM — G89.18 ACUTE POSTOPERATIVE PAIN: Status: ACTIVE | Noted: 2020-09-30

## 2020-09-30 LAB
ANION GAP SERPL CALCULATED.3IONS-SCNC: 7 MMOL/L (ref 5–15)
BUN SERPL-MCNC: 8 MG/DL (ref 8–23)
BUN/CREAT SERPL: 11.4 (ref 7–25)
CALCIUM SPEC-SCNC: 8.6 MG/DL (ref 8.6–10.5)
CHLORIDE SERPL-SCNC: 106 MMOL/L (ref 98–107)
CO2 SERPL-SCNC: 25 MMOL/L (ref 22–29)
CREAT SERPL-MCNC: 0.7 MG/DL (ref 0.57–1)
DEPRECATED RDW RBC AUTO: 45.6 FL (ref 37–54)
ERYTHROCYTE [DISTWIDTH] IN BLOOD BY AUTOMATED COUNT: 12.7 % (ref 12.3–15.4)
GFR SERPL CREATININE-BSD FRML MDRD: 82 ML/MIN/1.73
GLUCOSE SERPL-MCNC: 132 MG/DL (ref 65–99)
HCT VFR BLD AUTO: 31.6 % (ref 34–46.6)
HGB BLD-MCNC: 10.5 G/DL (ref 12–15.9)
MCH RBC QN AUTO: 32.6 PG (ref 26.6–33)
MCHC RBC AUTO-ENTMCNC: 33.2 G/DL (ref 31.5–35.7)
MCV RBC AUTO: 98.1 FL (ref 79–97)
PLATELET # BLD AUTO: 143 10*3/MM3 (ref 140–450)
PMV BLD AUTO: 11.2 FL (ref 6–12)
POTASSIUM SERPL-SCNC: 4 MMOL/L (ref 3.5–5.2)
RBC # BLD AUTO: 3.22 10*6/MM3 (ref 3.77–5.28)
SODIUM SERPL-SCNC: 138 MMOL/L (ref 136–145)
WBC # BLD AUTO: 11.97 10*3/MM3 (ref 3.4–10.8)

## 2020-09-30 PROCEDURE — 63710000001 ACETAMINOPHEN 325 MG TABLET: Performed by: ORTHOPAEDIC SURGERY

## 2020-09-30 PROCEDURE — 63710000001 PANTOPRAZOLE 40 MG TABLET DELAYED-RELEASE: Performed by: NURSE PRACTITIONER

## 2020-09-30 PROCEDURE — 63710000001 APIXABAN 2.5 MG TABLET: Performed by: NURSE PRACTITIONER

## 2020-09-30 PROCEDURE — 97110 THERAPEUTIC EXERCISES: CPT

## 2020-09-30 PROCEDURE — 97530 THERAPEUTIC ACTIVITIES: CPT

## 2020-09-30 PROCEDURE — A9270 NON-COVERED ITEM OR SERVICE: HCPCS | Performed by: NURSE PRACTITIONER

## 2020-09-30 PROCEDURE — 63710000001 FLUOXETINE 20 MG CAPSULE: Performed by: NURSE PRACTITIONER

## 2020-09-30 PROCEDURE — A9270 NON-COVERED ITEM OR SERVICE: HCPCS | Performed by: ORTHOPAEDIC SURGERY

## 2020-09-30 PROCEDURE — 97116 GAIT TRAINING THERAPY: CPT

## 2020-09-30 PROCEDURE — 80048 BASIC METABOLIC PNL TOTAL CA: CPT | Performed by: NURSE PRACTITIONER

## 2020-09-30 PROCEDURE — 85027 COMPLETE CBC AUTOMATED: CPT | Performed by: ORTHOPAEDIC SURGERY

## 2020-09-30 PROCEDURE — 25010000003 CEFAZOLIN IN DEXTROSE 2-4 GM/100ML-% SOLUTION: Performed by: ORTHOPAEDIC SURGERY

## 2020-09-30 PROCEDURE — 63710000001 MELOXICAM 7.5 MG TABLET: Performed by: ORTHOPAEDIC SURGERY

## 2020-09-30 PROCEDURE — 99024 POSTOP FOLLOW-UP VISIT: CPT | Performed by: ORTHOPAEDIC SURGERY

## 2020-09-30 PROCEDURE — 63710000001 OXYCODONE 5 MG TABLET: Performed by: ORTHOPAEDIC SURGERY

## 2020-09-30 RX ORDER — OXYCODONE HYDROCHLORIDE 5 MG/1
5 TABLET ORAL EVERY 4 HOURS PRN
Qty: 20 TABLET | Refills: 0 | Status: SHIPPED | OUTPATIENT
Start: 2020-09-30 | End: 2020-10-09

## 2020-09-30 RX ADMIN — FLUOXETINE HYDROCHLORIDE 20 MG: 20 CAPSULE ORAL at 09:02

## 2020-09-30 RX ADMIN — PANTOPRAZOLE SODIUM 40 MG: 40 TABLET, DELAYED RELEASE ORAL at 21:02

## 2020-09-30 RX ADMIN — ACETAMINOPHEN 650 MG: 325 TABLET, FILM COATED ORAL at 00:30

## 2020-09-30 RX ADMIN — OXYCODONE 5 MG: 5 TABLET ORAL at 09:03

## 2020-09-30 RX ADMIN — MELOXICAM 15 MG: 7.5 TABLET ORAL at 09:02

## 2020-09-30 RX ADMIN — OXYCODONE 5 MG: 5 TABLET ORAL at 22:28

## 2020-09-30 RX ADMIN — APIXABAN 2.5 MG: 2.5 TABLET, FILM COATED ORAL at 09:03

## 2020-09-30 RX ADMIN — CEFAZOLIN SODIUM 2 G: 2 INJECTION, SOLUTION INTRAVENOUS at 01:50

## 2020-09-30 RX ADMIN — SODIUM CHLORIDE, PRESERVATIVE FREE 3 ML: 5 INJECTION INTRAVENOUS at 21:03

## 2020-09-30 RX ADMIN — OXYCODONE 5 MG: 5 TABLET ORAL at 01:47

## 2020-09-30 RX ADMIN — SODIUM CHLORIDE, PRESERVATIVE FREE 3 ML: 5 INJECTION INTRAVENOUS at 09:12

## 2020-09-30 RX ADMIN — PANTOPRAZOLE SODIUM 40 MG: 40 TABLET, DELAYED RELEASE ORAL at 09:02

## 2020-09-30 RX ADMIN — SODIUM CHLORIDE 120 ML/HR: 9 INJECTION, SOLUTION INTRAVENOUS at 00:30

## 2020-09-30 RX ADMIN — APIXABAN 2.5 MG: 2.5 TABLET, FILM COATED ORAL at 21:02

## 2020-10-01 ENCOUNTER — APPOINTMENT (OUTPATIENT)
Dept: CARDIOLOGY | Facility: HOSPITAL | Age: 75
End: 2020-10-01

## 2020-10-01 PROBLEM — E83.39 HYPOPHOSPHATEMIA: Status: ACTIVE | Noted: 2020-10-01

## 2020-10-01 PROBLEM — E83.42 HYPOMAGNESEMIA: Status: ACTIVE | Noted: 2020-10-01

## 2020-10-01 PROBLEM — R07.89 CHEST PAIN, ATYPICAL: Status: ACTIVE | Noted: 2020-10-01

## 2020-10-01 LAB
ALBUMIN SERPL-MCNC: 3.6 G/DL (ref 3.5–5.2)
ALBUMIN/GLOB SERPL: 1.5 G/DL
ALP SERPL-CCNC: 62 U/L (ref 39–117)
ALT SERPL W P-5'-P-CCNC: 7 U/L (ref 1–33)
ANION GAP SERPL CALCULATED.3IONS-SCNC: 8 MMOL/L (ref 5–15)
AST SERPL-CCNC: 18 U/L (ref 1–32)
BH CV ECHO MEAS - AI DEC SLOPE: 239.9 CM/SEC^2
BH CV ECHO MEAS - AI MAX PG: 63 MMHG
BH CV ECHO MEAS - AI MAX VEL: 396.7 CM/SEC
BH CV ECHO MEAS - AI P1/2T: 484.4 MSEC
BH CV ECHO MEAS - AO MAX PG (FULL): 8 MMHG
BH CV ECHO MEAS - AO MAX PG: 15.9 MMHG
BH CV ECHO MEAS - AO MEAN PG (FULL): 5.1 MMHG
BH CV ECHO MEAS - AO MEAN PG: 9.3 MMHG
BH CV ECHO MEAS - AO ROOT AREA (BSA CORRECTED): 1.8
BH CV ECHO MEAS - AO ROOT AREA: 8 CM^2
BH CV ECHO MEAS - AO ROOT DIAM: 3.2 CM
BH CV ECHO MEAS - AO V2 MAX: 199.1 CM/SEC
BH CV ECHO MEAS - AO V2 MEAN: 140.9 CM/SEC
BH CV ECHO MEAS - AO V2 VTI: 35.7 CM
BH CV ECHO MEAS - ASC AORTA: 3.5 CM
BH CV ECHO MEAS - AVA(I,A): 2.2 CM^2
BH CV ECHO MEAS - AVA(I,D): 2.2 CM^2
BH CV ECHO MEAS - AVA(V,A): 2.2 CM^2
BH CV ECHO MEAS - AVA(V,D): 2.2 CM^2
BH CV ECHO MEAS - BSA(HAYCOCK): 1.8 M^2
BH CV ECHO MEAS - BSA: 1.7 M^2
BH CV ECHO MEAS - BZI_BMI: 28 KILOGRAMS/M^2
BH CV ECHO MEAS - BZI_METRIC_HEIGHT: 160 CM
BH CV ECHO MEAS - BZI_METRIC_WEIGHT: 71.7 KG
BH CV ECHO MEAS - EDV(CUBED): 124.1 ML
BH CV ECHO MEAS - EDV(MOD-SP2): 133 ML
BH CV ECHO MEAS - EDV(MOD-SP4): 125 ML
BH CV ECHO MEAS - EDV(TEICH): 117.6 ML
BH CV ECHO MEAS - EF(CUBED): 39.2 %
BH CV ECHO MEAS - EF(MOD-BP): 42 %
BH CV ECHO MEAS - EF(MOD-SP2): 46.6 %
BH CV ECHO MEAS - EF(MOD-SP4): 42.4 %
BH CV ECHO MEAS - EF(TEICH): 32.2 %
BH CV ECHO MEAS - ESV(CUBED): 75.5 ML
BH CV ECHO MEAS - ESV(MOD-SP2): 71 ML
BH CV ECHO MEAS - ESV(MOD-SP4): 72 ML
BH CV ECHO MEAS - ESV(TEICH): 79.7 ML
BH CV ECHO MEAS - FS: 15.3 %
BH CV ECHO MEAS - IVS/LVPW: 1
BH CV ECHO MEAS - IVSD: 1.1 CM
BH CV ECHO MEAS - LA DIMENSION: 4.5 CM
BH CV ECHO MEAS - LA/AO: 1.4
BH CV ECHO MEAS - LAD MAJOR: 5.4 CM
BH CV ECHO MEAS - LAT PEAK E' VEL: 11 CM/SEC
BH CV ECHO MEAS - LATERAL E/E' RATIO: 12.3
BH CV ECHO MEAS - LV DIASTOLIC VOL/BSA (35-75): 71.5 ML/M^2
BH CV ECHO MEAS - LV IVRT: 0.08 SEC
BH CV ECHO MEAS - LV MASS(C)D: 211 GRAMS
BH CV ECHO MEAS - LV MASS(C)DI: 120.6 GRAMS/M^2
BH CV ECHO MEAS - LV MAX PG: 7.9 MMHG
BH CV ECHO MEAS - LV MEAN PG: 4.2 MMHG
BH CV ECHO MEAS - LV SYSTOLIC VOL/BSA (12-30): 41.2 ML/M^2
BH CV ECHO MEAS - LV V1 MAX: 140.5 CM/SEC
BH CV ECHO MEAS - LV V1 MEAN: 94.3 CM/SEC
BH CV ECHO MEAS - LV V1 VTI: 25.2 CM
BH CV ECHO MEAS - LVIDD: 5 CM
BH CV ECHO MEAS - LVIDS: 4.2 CM
BH CV ECHO MEAS - LVLD AP2: 8.5 CM
BH CV ECHO MEAS - LVLD AP4: 8.3 CM
BH CV ECHO MEAS - LVLS AP2: 8.2 CM
BH CV ECHO MEAS - LVLS AP4: 7.5 CM
BH CV ECHO MEAS - LVOT AREA (M): 3.1 CM^2
BH CV ECHO MEAS - LVOT AREA: 3.1 CM^2
BH CV ECHO MEAS - LVOT DIAM: 2 CM
BH CV ECHO MEAS - LVPWD: 1.1 CM
BH CV ECHO MEAS - MED PEAK E' VEL: 7.2 CM/SEC
BH CV ECHO MEAS - MEDIAL E/E' RATIO: 18.6
BH CV ECHO MEAS - MR MAX PG: 102 MMHG
BH CV ECHO MEAS - MR MAX VEL: 504.9 CM/SEC
BH CV ECHO MEAS - MR MEAN PG: 65.8 MMHG
BH CV ECHO MEAS - MR MEAN VEL: 379.9 CM/SEC
BH CV ECHO MEAS - MR VTI: 141.8 CM
BH CV ECHO MEAS - MV A MAX VEL: 124.9 CM/SEC
BH CV ECHO MEAS - MV DEC SLOPE: 820.3 CM/SEC^2
BH CV ECHO MEAS - MV DEC TIME: 0.29 SEC
BH CV ECHO MEAS - MV E MAX VEL: 137.3 CM/SEC
BH CV ECHO MEAS - MV E/A: 1.1
BH CV ECHO MEAS - MV MAX PG: 12.8 MMHG
BH CV ECHO MEAS - MV MEAN PG: 7 MMHG
BH CV ECHO MEAS - MV P1/2T MAX VEL: 184.8 CM/SEC
BH CV ECHO MEAS - MV P1/2T: 66 MSEC
BH CV ECHO MEAS - MV V2 MAX: 179.1 CM/SEC
BH CV ECHO MEAS - MV V2 MEAN: 127.7 CM/SEC
BH CV ECHO MEAS - MV V2 VTI: 54.9 CM
BH CV ECHO MEAS - MVA P1/2T LCG: 1.2 CM^2
BH CV ECHO MEAS - MVA(P1/2T): 3.3 CM^2
BH CV ECHO MEAS - MVA(VTI): 1.4 CM^2
BH CV ECHO MEAS - PA ACC SLOPE: 998.8 CM/SEC^2
BH CV ECHO MEAS - PA ACC TIME: 0.08 SEC
BH CV ECHO MEAS - PA MAX PG: 3.9 MMHG
BH CV ECHO MEAS - PA PR(ACCEL): 41 MMHG
BH CV ECHO MEAS - PA V2 MAX: 99.1 CM/SEC
BH CV ECHO MEAS - RAP SYSTOLE: 8 MMHG
BH CV ECHO MEAS - RVSP: 52 MMHG
BH CV ECHO MEAS - SI(AO): 162.5 ML/M^2
BH CV ECHO MEAS - SI(CUBED): 27.8 ML/M^2
BH CV ECHO MEAS - SI(LVOT): 44.8 ML/M^2
BH CV ECHO MEAS - SI(MOD-SP2): 35.4 ML/M^2
BH CV ECHO MEAS - SI(MOD-SP4): 30.3 ML/M^2
BH CV ECHO MEAS - SI(TEICH): 21.6 ML/M^2
BH CV ECHO MEAS - SV(AO): 284.2 ML
BH CV ECHO MEAS - SV(CUBED): 48.6 ML
BH CV ECHO MEAS - SV(LVOT): 78.4 ML
BH CV ECHO MEAS - SV(MOD-SP2): 62 ML
BH CV ECHO MEAS - SV(MOD-SP4): 53 ML
BH CV ECHO MEAS - SV(TEICH): 37.8 ML
BH CV ECHO MEAS - TAPSE (>1.6): 1.5 CM
BH CV ECHO MEAS - TR MAX PG: 44 MMHG
BH CV ECHO MEAS - TR MAX VEL: 328.2 CM/SEC
BH CV ECHO MEASUREMENTS AVERAGE E/E' RATIO: 15.09
BH CV VAS BP RIGHT ARM: NORMAL MMHG
BH CV XLRA - RV BASE: 3.6 CM
BH CV XLRA - RV LENGTH: 6.9 CM
BH CV XLRA - RV MID: 2.6 CM
BH CV XLRA - TDI S': 9.34 CM/SEC
BILIRUB SERPL-MCNC: 0.3 MG/DL (ref 0–1.2)
BUN SERPL-MCNC: 11 MG/DL (ref 8–23)
BUN/CREAT SERPL: 18.3 (ref 7–25)
CALCIUM SPEC-SCNC: 8.6 MG/DL (ref 8.6–10.5)
CHLORIDE SERPL-SCNC: 101 MMOL/L (ref 98–107)
CO2 SERPL-SCNC: 25 MMOL/L (ref 22–29)
CREAT SERPL-MCNC: 0.6 MG/DL (ref 0.57–1)
DEPRECATED RDW RBC AUTO: 48 FL (ref 37–54)
ERYTHROCYTE [DISTWIDTH] IN BLOOD BY AUTOMATED COUNT: 13.2 % (ref 12.3–15.4)
GFR SERPL CREATININE-BSD FRML MDRD: 98 ML/MIN/1.73
GLOBULIN UR ELPH-MCNC: 2.4 GM/DL
GLUCOSE BLDC GLUCOMTR-MCNC: 125 MG/DL (ref 70–130)
GLUCOSE SERPL-MCNC: 128 MG/DL (ref 65–99)
HCT VFR BLD AUTO: 30.6 % (ref 34–46.6)
HGB BLD-MCNC: 9.8 G/DL (ref 12–15.9)
LEFT ATRIUM VOLUME INDEX: 30.3 ML/M^2
LEFT ATRIUM VOLUME: 53 ML
LV EF 2D ECHO EST: 45 %
MAGNESIUM SERPL-MCNC: 1.8 MG/DL (ref 1.6–2.4)
MAXIMAL PREDICTED HEART RATE: 146 BPM
MCH RBC QN AUTO: 32 PG (ref 26.6–33)
MCHC RBC AUTO-ENTMCNC: 32 G/DL (ref 31.5–35.7)
MCV RBC AUTO: 100 FL (ref 79–97)
PHOSPHATE SERPL-MCNC: 1.8 MG/DL (ref 2.5–4.5)
PHOSPHATE SERPL-MCNC: 2.4 MG/DL (ref 2.5–4.5)
PLATELET # BLD AUTO: 122 10*3/MM3 (ref 140–450)
PMV BLD AUTO: 11.3 FL (ref 6–12)
POTASSIUM SERPL-SCNC: 3.8 MMOL/L (ref 3.5–5.2)
PROT SERPL-MCNC: 6 G/DL (ref 6–8.5)
RBC # BLD AUTO: 3.06 10*6/MM3 (ref 3.77–5.28)
SODIUM SERPL-SCNC: 134 MMOL/L (ref 136–145)
STRESS TARGET HR: 124 BPM
TROPONIN T SERPL-MCNC: <0.01 NG/ML (ref 0–0.03)
WBC # BLD AUTO: 10.49 10*3/MM3 (ref 3.4–10.8)

## 2020-10-01 PROCEDURE — 83735 ASSAY OF MAGNESIUM: CPT | Performed by: INTERNAL MEDICINE

## 2020-10-01 PROCEDURE — 63710000001 ISOSORBIDE MONONITRATE 30 MG TABLET SUSTAINED-RELEASE 24 HOUR: Performed by: PHYSICIAN ASSISTANT

## 2020-10-01 PROCEDURE — 93010 ELECTROCARDIOGRAM REPORT: CPT | Performed by: INTERNAL MEDICINE

## 2020-10-01 PROCEDURE — 93005 ELECTROCARDIOGRAM TRACING: CPT | Performed by: INTERNAL MEDICINE

## 2020-10-01 PROCEDURE — A9270 NON-COVERED ITEM OR SERVICE: HCPCS | Performed by: NURSE PRACTITIONER

## 2020-10-01 PROCEDURE — A9270 NON-COVERED ITEM OR SERVICE: HCPCS | Performed by: INTERNAL MEDICINE

## 2020-10-01 PROCEDURE — 84484 ASSAY OF TROPONIN QUANT: CPT | Performed by: INTERNAL MEDICINE

## 2020-10-01 PROCEDURE — A9270 NON-COVERED ITEM OR SERVICE: HCPCS | Performed by: PHYSICIAN ASSISTANT

## 2020-10-01 PROCEDURE — 63710000001 NITROGLYCERIN 0.4 MG SUBLINGUAL TABLET 25 EACH BOTTLE: Performed by: INTERNAL MEDICINE

## 2020-10-01 PROCEDURE — 97110 THERAPEUTIC EXERCISES: CPT

## 2020-10-01 PROCEDURE — A9270 NON-COVERED ITEM OR SERVICE: HCPCS | Performed by: ORTHOPAEDIC SURGERY

## 2020-10-01 PROCEDURE — 63710000001 ACETAMINOPHEN 325 MG TABLET: Performed by: ORTHOPAEDIC SURGERY

## 2020-10-01 PROCEDURE — 63710000001 FLUOXETINE 20 MG CAPSULE: Performed by: NURSE PRACTITIONER

## 2020-10-01 PROCEDURE — 63710000001 ASPIRIN 81 MG CHEWABLE TABLET: Performed by: INTERNAL MEDICINE

## 2020-10-01 PROCEDURE — 93005 ELECTROCARDIOGRAM TRACING: CPT | Performed by: NURSE PRACTITIONER

## 2020-10-01 PROCEDURE — 63710000001 MELOXICAM 7.5 MG TABLET: Performed by: ORTHOPAEDIC SURGERY

## 2020-10-01 PROCEDURE — 85027 COMPLETE CBC AUTOMATED: CPT | Performed by: ORTHOPAEDIC SURGERY

## 2020-10-01 PROCEDURE — 97116 GAIT TRAINING THERAPY: CPT

## 2020-10-01 PROCEDURE — 63710000001 PSYLLIUM 58.12 % PACK: Performed by: NURSE PRACTITIONER

## 2020-10-01 PROCEDURE — 97165 OT EVAL LOW COMPLEX 30 MIN: CPT

## 2020-10-01 PROCEDURE — 63710000001 PANTOPRAZOLE 40 MG TABLET DELAYED-RELEASE: Performed by: NURSE PRACTITIONER

## 2020-10-01 PROCEDURE — 99024 POSTOP FOLLOW-UP VISIT: CPT | Performed by: ORTHOPAEDIC SURGERY

## 2020-10-01 PROCEDURE — 84100 ASSAY OF PHOSPHORUS: CPT | Performed by: NURSE PRACTITIONER

## 2020-10-01 PROCEDURE — 97535 SELF CARE MNGMENT TRAINING: CPT

## 2020-10-01 PROCEDURE — 63710000001 CARVEDILOL 3.125 MG TABLET: Performed by: NURSE PRACTITIONER

## 2020-10-01 PROCEDURE — 93306 TTE W/DOPPLER COMPLETE: CPT

## 2020-10-01 PROCEDURE — 63710000001 DOCUSATE SODIUM 100 MG CAPSULE: Performed by: NURSE PRACTITIONER

## 2020-10-01 PROCEDURE — 84100 ASSAY OF PHOSPHORUS: CPT | Performed by: INTERNAL MEDICINE

## 2020-10-01 PROCEDURE — 63710000001 APIXABAN 2.5 MG TABLET: Performed by: NURSE PRACTITIONER

## 2020-10-01 PROCEDURE — 80053 COMPREHEN METABOLIC PANEL: CPT | Performed by: INTERNAL MEDICINE

## 2020-10-01 PROCEDURE — 82962 GLUCOSE BLOOD TEST: CPT

## 2020-10-01 RX ORDER — MAGNESIUM SULFATE HEPTAHYDRATE 40 MG/ML
2 INJECTION, SOLUTION INTRAVENOUS AS NEEDED
Status: DISCONTINUED | OUTPATIENT
Start: 2020-10-01 | End: 2020-10-02 | Stop reason: HOSPADM

## 2020-10-01 RX ORDER — MAGNESIUM SULFATE HEPTAHYDRATE 40 MG/ML
4 INJECTION, SOLUTION INTRAVENOUS AS NEEDED
Status: DISCONTINUED | OUTPATIENT
Start: 2020-10-01 | End: 2020-10-02 | Stop reason: HOSPADM

## 2020-10-01 RX ORDER — DOCUSATE SODIUM 100 MG/1
100 CAPSULE, LIQUID FILLED ORAL 2 TIMES DAILY
Status: DISCONTINUED | OUTPATIENT
Start: 2020-10-01 | End: 2020-10-02 | Stop reason: HOSPADM

## 2020-10-01 RX ORDER — NITROGLYCERIN 0.4 MG/1
0.4 TABLET SUBLINGUAL
Status: DISCONTINUED | OUTPATIENT
Start: 2020-10-01 | End: 2020-10-02 | Stop reason: HOSPADM

## 2020-10-01 RX ORDER — BISACODYL 5 MG/1
10 TABLET, DELAYED RELEASE ORAL DAILY PRN
Status: DISCONTINUED | OUTPATIENT
Start: 2020-10-01 | End: 2020-10-02 | Stop reason: HOSPADM

## 2020-10-01 RX ORDER — ISOSORBIDE MONONITRATE 30 MG/1
30 TABLET, EXTENDED RELEASE ORAL
Status: DISCONTINUED | OUTPATIENT
Start: 2020-10-01 | End: 2020-10-02 | Stop reason: HOSPADM

## 2020-10-01 RX ORDER — ASPIRIN 81 MG/1
81 TABLET, CHEWABLE ORAL DAILY
Status: DISCONTINUED | OUTPATIENT
Start: 2020-10-01 | End: 2020-10-02 | Stop reason: HOSPADM

## 2020-10-01 RX ADMIN — PANTOPRAZOLE SODIUM 40 MG: 40 TABLET, DELAYED RELEASE ORAL at 08:17

## 2020-10-01 RX ADMIN — PANTOPRAZOLE SODIUM 40 MG: 40 TABLET, DELAYED RELEASE ORAL at 19:55

## 2020-10-01 RX ADMIN — ACETAMINOPHEN 650 MG: 325 TABLET, FILM COATED ORAL at 20:09

## 2020-10-01 RX ADMIN — MELOXICAM 15 MG: 7.5 TABLET ORAL at 08:16

## 2020-10-01 RX ADMIN — ASPIRIN 81 MG: 81 TABLET, CHEWABLE ORAL at 08:49

## 2020-10-01 RX ADMIN — POTASSIUM PHOSPHATE, MONOBASIC AND POTASSIUM PHOSPHATE, DIBASIC 15 MMOL: 224; 236 INJECTION, SOLUTION, CONCENTRATE INTRAVENOUS at 11:40

## 2020-10-01 RX ADMIN — SODIUM CHLORIDE, PRESERVATIVE FREE 3 ML: 5 INJECTION INTRAVENOUS at 19:56

## 2020-10-01 RX ADMIN — APIXABAN 2.5 MG: 2.5 TABLET, FILM COATED ORAL at 08:17

## 2020-10-01 RX ADMIN — FLUOXETINE HYDROCHLORIDE 20 MG: 20 CAPSULE ORAL at 08:17

## 2020-10-01 RX ADMIN — DOCUSATE SODIUM 100 MG: 100 CAPSULE, LIQUID FILLED ORAL at 19:54

## 2020-10-01 RX ADMIN — ISOSORBIDE MONONITRATE 30 MG: 30 TABLET, EXTENDED RELEASE ORAL at 09:54

## 2020-10-01 RX ADMIN — PSYLLIUM HUSK 1 PACKET: 3.4 POWDER ORAL at 15:07

## 2020-10-01 RX ADMIN — NITROGLYCERIN 0.4 MG: 0.4 TABLET SUBLINGUAL at 08:41

## 2020-10-01 RX ADMIN — CARVEDILOL 3.12 MG: 3.12 TABLET, FILM COATED ORAL at 08:17

## 2020-10-01 RX ADMIN — APIXABAN 2.5 MG: 2.5 TABLET, FILM COATED ORAL at 19:55

## 2020-10-01 NOTE — PROGRESS NOTES
Continued Stay Note  [facility]     Patient Name: Alicia Young  MRN: [id number]  Today's Date: 10/1/2020    Admit Date: 9/29/2020    Discharge Plan     Row Name 10/01/20 1154       Plan    Plan  Home w/ HH    Provided Post Acute Provider List?  Yes    Post Acute Provider List  Home Health    Patient/Family in Agreement with Plan  yes    Plan Comments  Per Viri w/ [facility], insurance has denied patient for short term rehab. Patient is currently walking 350ft w/ RW and CGA. D/w patient and dtr at bedside. Patient would like to return home w/ HH. A referral has been made to [facility] for [place], Al following. No other needs noted. CM following.    Final Discharge Disposition Code  06 - home with home health care        Discharge Codes    No documentation.             Tiffany Farah RN

## 2020-10-01 NOTE — PLAN OF CARE
This morning, patient reported chest tightness and heaviness. RR called, EKGs and labs obtained. Critical phos called. Phos replaced. Patient reported improvement in symptoms after repositioning and nitroglycerin. Sitting up in chair this afternoon.

## 2020-10-01 NOTE — THERAPY EVALUATION
Patient Name: Alicia Young  : 1945    MRN: [id number]                              Today's Date: 10/1/2020       Admit Date: 2020    Visit Dx:     ICD-10-CM ICD-9-CM   1. Status post total left knee replacement  Z96.652 V43.65   2. Primary osteoarthritis of left knee  M17.12 715.16     Patient Active Problem List   Diagnosis   • Abnormal stress test   • Primary osteoarthritis of left knee   • Status post total left knee replacement   • CHF (congestive heart failure) (CMS/[facility])   • HTN (hypertension)   • Hyperlipidemia   • Leukocytosis, mild, likely reactive   • Acute postoperative pain   • Chest pain, atypical   • Hypophosphatemia, replaced   • Hypomagnesemia, replaced     Past Medical History:   Diagnosis Date   • Arthritis    • CHF (congestive heart failure) (CMS/[facility])    • Depression    • GERD (gastroesophageal reflux disease)    • History of heart attack    • Hyperlipidemia    • Hypertension    • Macular degeneration    • Wears glasses      Past Surgical History:   Procedure Laterality Date   • CARDIAC CATHETERIZATION     • CARDIAC CATHETERIZATION N/A 2019    Procedure: Left Heart Cath (38914);  Surgeon: Ronny Hardin MD;  Location:  "[facility] CATH INVASIVE LOCATION;  Service: Cardiovascular   • CHOLECYSTECTOMY     • COLONOSCOPY     • CORONARY ARTERY BYPASS GRAFT     • TOTAL KNEE ARTHROPLASTY Left 2020    Procedure: TOTAL KNEE ARTHROPLASTY LEFT;  Surgeon: Heladio Arroyo MD;  Location: [facility] OR;  Service: Orthopedics;  Laterality: Left;     General Information     Row Name 10/01/20 1500          OT Time and Intention    Document Type  evaluation  -HK     Mode of Treatment  occupational therapy  -     Row Name 10/01/20 1500          General Information    Patient Profile Reviewed  yes  -HK     Prior Level of Function  all household mobility;community mobility;gait;transfer;bed mobility;ADL's;min assist:  -HK     Existing Precautions/Restrictions  fall L adductor canal nerve  catheter  -HK     Barriers to Rehab  previous functional deficit  -HK     Row Name 10/01/20 1500          Living Environment    Lives With  alone  -     Row Name 10/01/20 1500          Home Main Entrance    Number of Stairs, Main Entrance  two  -HK     Stair Railings, Main Entrance  none  -HK     Row Name 10/01/20 1500          Stairs Within Home, Primary    Number of Stairs, Within Home, Primary  none  -HK     Stairs Comment, Within Home, Primary  Pt has tub shower. OT issued hand out on tub transfer bench and educated pt on need.  -HK     Row Name 10/01/20 1500          Cognition    Orientation Status (Cognition)  oriented x 4  -HK     Row Name 10/01/20 1500          Safety Issues, Functional Mobility    Safety Issues Affecting Function (Mobility)  safety precaution awareness;safety precautions follow-through/compliance;sequencing abilities  -     Impairments Affecting Function (Mobility)  balance;pain;strength;range of motion (ROM)  -       User Key  (r) = Recorded By, (t) = Taken By, (c) = Cosigned By    Initials Name Provider Type     Rosaura Gallegos, OT Occupational Therapist        Mobility/ADL's     Row Name 10/01/20 1503          Bed Mobility    Comment (Bed Mobility)  Pt received and left [facility]. OT issued leg  and educated pt on use. Pt educated on knee precautions and how to maintain during ADLS and mobility.  -     Row Name 10/01/20 1503          Transfers    Transfers  sit-stand transfer  -     Comment (Transfers)  Pt completed sit to stand from chair with CGA and RW.  -     Sit-Stand [place] (Transfers)  contact guard;verbal cues  -     Row Name 10/01/20 1503          Sit-Stand Transfer    Assistive Device (Sit-Stand Transfers)  walker, front-wheeled  -     Row Name 10/01/20 1503          Functional Mobility    Functional Mobility- Comment  not tested  -     Row Name 10/01/20 1503          Activities of Daily Living    BADL Assessment/Intervention  upper body dressing;lower  body dressing  -HK     Row Name 10/01/20 1503          Mobility    Extremity Weight-bearing Status  left lower extremity  -HK     Left Lower Extremity (Weight-bearing Status)  weight-bearing as tolerated (WBAT)  -HK     Row Name 10/01/20 1503          Upper Body Dressing Assessment/Training    [place] Level (Upper Body Dressing)  don;pull-over garment;minimum assist (75% patient effort);verbal cues  -HK     Position (Upper Body Dressing)  unsupported sitting  -HK     Row Name 10/01/20 1503          Lower Body Dressing Assessment/Training    [place] Level (Lower Body Dressing)  doff;don;pants/bottoms;socks;minimum assist (75% patient effort);verbal cues  -HK     Assistive Devices (Lower Body Dressing)  reacher;sock-aid;long-handled shoe horn  -HK     Position (Lower Body Dressing)  unsupported sitting  -HK     Comment (Lower Body Dressing)  OT issued appropriate AE and educated pt on use. Pt donned pants with Matias and don/dof socks with Matias and use of AE.  -HK       User Key  (r) = Recorded By, (t) = Taken By, (c) = Cosigned By    Initials Name Provider Type     Rosaura Gallegos, OT Occupational Therapist        Obj/Interventions     Row Name 10/01/20 1512          Sensory Assessment (Somatosensory)    Sensory Assessment (Somatosensory)  sensation intact  -     Row Name 10/01/20 1512          Vision Assessment/Intervention    Visual Impairment/Limitations  WFL  -HK     Row Name 10/01/20 1512          Range of Motion Comprehensive    General Range of Motion  no range of motion deficits identified  -HK     Comment, General Range of Motion  B UE WFL For eval  -HK     Row Name 10/01/20 1512          Strength Comprehensive (MMT)    General Manual Muscle Testing (MMT) Assessment  no strength deficits identified  -HK     Comment, General Manual Muscle Testing (MMT) Assessment  B UE WFL for eval  -HK     Row Name 10/01/20 1512          Balance    Balance Assessment  sitting static balance;sitting dynamic  balance;standing static balance;standing dynamic balance  -HK     Static Sitting Balance  WFL  -HK     Dynamic Sitting Balance  WFL  -HK     Static Standing Balance  WFL  -HK     Dynamic Standing Balance  mild impairment  -HK       User Key  (r) = Recorded By, (t) = Taken By, (c) = Cosigned By    Initials Name Provider Type    Rosaura Jennings, OT Occupational Therapist        Goals/Plan     Row Name 10/01/20 1516          Bed Mobility Goal 1 (OT)    Activity/Assistive Device (Bed Mobility Goal 1, OT)  sit to supine/supine to sit;scooting;leg   -HK     [place] Level/Cues Needed (Bed Mobility Goal 1, OT)  minimum assist (75% or more patient effort);verbal cues required  -HK     Time Frame (Bed Mobility Goal 1, OT)  by discharge  -HK     Progress/Outcomes (Bed Mobility Goal 1, OT)  goal ongoing  [facility]     Row Name 10/01/20 1516          Transfer Goal 1 (OT)    Activity/Assistive Device (Transfer Goal 1, OT)  sit-to-stand/stand-to-sit;toilet  -HK     [place] Level/Cues Needed (Transfer Goal 1, OT)  standby assist  -HK     Time Frame (Transfer Goal 1, OT)  by discharge  -HK     Progress/Outcome (Transfer Goal 1, OT)  goal ongoing  [facility]     Row Name 10/01/20 1516          Dressing Goal 1 (OT)    Activity/Device (Dressing Goal 1, OT)  lower body dressing;long-handled shoe horn;elastic laces;reacher;sock-aid  -HK     [place]/Cues Needed (Dressing Goal 1, OT)  modified independence  -HK     Time Frame (Dressing Goal 1, OT)  by discharge  -HK     Progress/Outcome (Dressing Goal 1, OT)  goal ongoing  [facility]     Row Name 10/01/20 1516          Toileting Goal 1 (OT)    Activity/Device (Toileting Goal 1, OT)  adjust/manage clothing;perform perineal hygiene  -HK     [place] Level/Cues Needed (Toileting Goal 1, OT)  minimum assist (75% or more patient effort);verbal cues required  -HK     Time Frame (Toileting Goal 1, OT)  by discharge  -HK     Progress/Outcome (Toileting Goal 1, OT)  goal ongoing  [facility]        User Key  (r) = Recorded By, (t) = Taken By, (c) = Cosigned By    Initials Name Provider Type     Rosaura Gallegos, OT Occupational Therapist        Clinical Impression     Row Name 10/01/20 1513          Pain Scale: Numbers Pre/Post-Treatment    Pretreatment Pain Rating  2/10  -HK     Posttreatment Pain Rating  2/10  -HK     Pain Location - Side  Left  -HK     Pain Location - Orientation  anterior  -HK     Pain Location  knee  -HK     Pain Intervention(s)  Ambulation/increased activity;Repositioned  -     Row Name 10/01/20 0033          Plan of Care Review    Plan of Care Reviewed With  patient;daughter  -HK     Progress  improving  -HK     Outcome Summary  OT eval complete. Pt completes sit to stand with CGA and RW. OT issued appropriate AE and educated pt on use for completion of all LBD and LBB. Pt completed UBD and LBD with Matias and use of AE. Recommend purchase of tub transfer bench and issued hand out. Pt would benefit from IPR however has been denied by insurance. Pt plans to return home with  OT and assist from family.  -     Row Name 10/01/20 5283          Therapy Assessment/Plan (OT)    Patient/Family Therapy Goal Statement (OT)  Pt would like to improve and return home.  -     Rehab Potential (OT)  good, to achieve stated therapy goals  -     Criteria for Skilled Therapeutic Interventions Met (OT)  yes;skilled treatment is necessary  -     Therapy Frequency (OT)  daily  -     Row Name 10/01/20 1513          Therapy Plan Review/Discharge Plan (OT)    Equipment Needs Upon Discharge (OT)  tub bench  -HK     Anticipated Discharge Disposition (OT)  home with assist;home with home health  -     Row Name 10/01/20 1513          Vital Signs    Pre Systolic BP Rehab  -- RN cleared for tx; VSS  -HK     O2 Delivery Pre Treatment  room air  -HK     O2 Delivery Intra Treatment  room air  -HK     O2 Delivery Post Treatment  room air  -HK     Pre Patient Position  Sitting  -HK     Intra Patient Position   Standing  -HK     Post Patient Position  Sitting  -HK     Row Name 10/01/20 1513          Positioning and Restraints    Pre-Treatment Position  sitting in chair/recliner  -HK     Post Treatment Position  chair  -HK     In Chair  notified nsg;reclined;call light within reach;encouraged to call for assist;exit alarm on;with family/caregiver  -       User Key  (r) = Recorded By, (t) = Taken By, (c) = Cosigned By    Initials Name Provider Type    Rosaura Jennings OT Occupational Therapist        Outcome Measures     Row Name 10/01/20 1516          How much help from another is currently needed...    Putting on and taking off regular lower body clothing?  3  -HK     Bathing (including washing, rinsing, and drying)  3  -HK     Toileting (which includes using toilet bed pan or urinal)  3  -HK     Putting on and taking off regular upper body clothing  3  -HK     Taking care of personal grooming (such as brushing teeth)  3  -HK     Eating meals  4  -HK     AM-PAC 6 Clicks Score (OT)  19  -HK     Row Name 10/01/20 1516          Functional Assessment    Outcome Measure Options  AM-PAC 6 Clicks Daily Activity (OT)  -       User Key  (r) = Recorded By, (t) = Taken By, (c) = Cosigned By    Initials Name Provider Type    Rosaura Jennings OT Occupational Therapist        Occupational Therapy Education                 Title: PT OT SLP Therapies (In Progress)     Topic: Occupational Therapy (In Progress)     Point: ADL training (In Progress)     Description:   Instruct learner(s) on proper safety adaptation and remediation techniques during self care or transfers.   Instruct in proper use of assistive devices.              Learning Progress Summary           Patient Acceptance, E,TB,D, NR by  at 10/1/2020 1517                   Point: Home exercise program (Not Started)     Description:   Instruct learner(s) on appropriate technique for monitoring, assisting and/or progressing therapeutic exercises/activities.               Learner Progress:  Not documented in this visit.          Point: Precautions (In Progress)     Description:   Instruct learner(s) on prescribed precautions during self-care and functional transfers.              Learning Progress Summary           Patient Acceptance, E,TB,D, NR by  at 10/1/2020 1517                   Point: Body mechanics (In Progress)     Description:   Instruct learner(s) on proper positioning and spine alignment during self-care, functional mobility activities and/or exercises.              Learning Progress Summary           Patient Acceptance, E,TB,D, NR by  at 10/1/2020 1517                               User Key     Initials Effective Dates Name Provider Type Discipline     03/07/18 -  Rosaura Gallegos, OT Occupational Therapist OT              OT Recommendation and Plan  Retired Outcome Summary/Treatment Plan (OT)  Anticipated Discharge Disposition (OT): home with assist, home with home health  Therapy Frequency (OT): daily  Plan of Care Review  Plan of Care Reviewed With: patient, daughter  Progress: improving  Outcome Summary: OT eval complete. Pt completes sit to stand with CGA and RW. OT issued appropriate AE and educated pt on use for completion of all LBD and LBB. Pt completed UBD and LBD with Matias and use of AE. Recommend purchase of tub transfer bench and issued hand out. Pt would benefit from IPR however has been denied by insurance. Pt plans to return home with HH OT and assist from family.  Plan of Care Reviewed With: patient, daughter  Outcome Summary: OT eval complete. Pt completes sit to stand with CGA and RW. OT issued appropriate AE and educated pt on use for completion of all LBD and LBB. Pt completed UBD and LBD with Matias and use of AE. Recommend purchase of tub transfer bench and issued hand out. Pt would benefit from IPR however has been denied by insurance. Pt plans to return home with HH OT and assist from family.     Time Calculation:   Time Calculation- OT     Row  Name 10/01/20 1439 10/01/20 1429 10/01/20 1156       Time Calculation- OT    OT Start Time  --  1429 -HK  --    OT Received On  --  10/01/20  -  --    OT Goal Re-Cert Due Date  --  10/11/20  -  --       Timed Charges    12823 - Gait Training Minutes   25  -KG  --  23  -KG    00769 - OT Self Care/Mgmt Minutes  --  24  -HK  --      User Key  (r) = Recorded By, (t) = Taken By, (c) = Cosigned By    Initials Name Provider Type     Rosaura Gallegos, OT Occupational Therapist    KG Flor Zelaya Physical Therapist        Therapy Charges for Today     Code Description Service Date Service Provider Modifiers Qty    [id number]  OT EVAL LOW COMPLEXITY 2 10/1/2020 Rosaura Gallegos, OT GO 1    [id number]  OT SELF CARE/MGMT/TRAIN EA 15 MIN 10/1/2020 Rosaura Gallegos, OT GO 2               Rosaura Gallegos OT  10/1/2020

## 2020-10-01 NOTE — THERAPY TREATMENT NOTE
Patient Name: Alicia Young  : 1945    MRN: [id number]                              Today's Date: 10/1/2020       Admit Date: 2020    Visit Dx:     ICD-10-CM ICD-9-CM   1. Status post total left knee replacement  Z96.652 V43.65   2. Primary osteoarthritis of left knee  M17.12 715.16     Patient Active Problem List   Diagnosis   • Abnormal stress test   • Primary osteoarthritis of left knee   • Status post total left knee replacement   • CHF (congestive heart failure) (CMS/[facility])   • HTN (hypertension)   • Hyperlipidemia   • Leukocytosis, mild, likely reactive   • Acute postoperative pain   • Chest pain, atypical   • Hypophosphatemia, replaced   • Hypomagnesemia, replaced     Past Medical History:   Diagnosis Date   • Arthritis    • CHF (congestive heart failure) (CMS/[facility])    • Depression    • GERD (gastroesophageal reflux disease)    • History of heart attack    • Hyperlipidemia    • Hypertension    • Macular degeneration    • Wears glasses      Past Surgical History:   Procedure Laterality Date   • CARDIAC CATHETERIZATION     • CARDIAC CATHETERIZATION N/A 2019    Procedure: Left Heart Cath (24608);  Surgeon: Ronny Hardin MD;  Location:  [facility] CATH INVASIVE LOCATION;  Service: Cardiovascular   • CHOLECYSTECTOMY     • COLONOSCOPY     • CORONARY ARTERY BYPASS GRAFT     • TOTAL KNEE ARTHROPLASTY Left 2020    Procedure: TOTAL KNEE ARTHROPLASTY LEFT;  Surgeon: Heladio Arroyo MD;  Location: [facility] OR;  Service: Orthopedics;  Laterality: Left;     General Information     Row Name 10/01/20 1432 10/01/20 1148       Physical Therapy Time and Intention    Document Type  therapy note (daily note)  -KG  therapy note (daily note)  -KG    Mode of Treatment  physical therapy  -KG  physical therapy  -KG    Row Name 10/01/20 1432 10/01/20 1148       General Information    Patient Profile Reviewed  yes  -KG  yes  -KG    Existing Precautions/Restrictions  fall  -KG  fall  -KG    Barriers to Rehab  --   none identified  -KG    Row Name 10/01/20 1148          Living Environment    Lives With  alone  -KG     Row Name 10/01/20 1432 10/01/20 1148       Cognition    Orientation Status (Cognition)  oriented x 4  -KG  oriented x 4  -KG    Row Name 10/01/20 1432 10/01/20 1148       Safety Issues, Functional Mobility    Impairments Affecting Function (Mobility)  balance;motor control;pain;strength  -KG  balance;motor control;pain;strength  -KG      User Key  (r) = Recorded By, (t) = Taken By, (c) = Cosigned By    Initials Name Provider Type    ANNA Flor Zelaya Physical Therapist        Mobility     Row Name 10/01/20 1432 10/01/20 1148       Bed Mobility    Bed Mobility  --  supine-sit;scooting/bridging  -KG    Scooting/Bridging [place] (Bed Mobility)  --  independent  -KG    Supine-Sit [place] (Bed Mobility)  --  modified independence;verbal cues  -KG    Sit-Supine [place] (Bed Mobility)  --  independent  -KG    Assistive Device (Bed Mobility)  --  bed rails;head of bed elevated  -KG    Comment (Bed Mobility)  UIC  -KG  able to perform with reminders for catheter bag  -KG    Row Name 10/01/20 1432 10/01/20 1148       Transfers    Comment (Transfers)  cues to push up from chair, slide LLE out for comfort  -KG  cues to push up from bed  -KG    Row Name 10/01/20 1432 10/01/20 1148       Sit-Stand Transfer    Sit-Stand [place] (Transfers)  modified independence  -KG  modified independence  -KG    Assistive Device (Sit-Stand Transfers)  walker, front-wheeled  -KG  walker, front-wheeled  -KG    Row Name 10/01/20 1432 10/01/20 1148       Gait/Stairs (Locomotion)    [place] Level (Gait)  verbal cues;contact guard  -KG  verbal cues;contact guard  -KG    Assistive Device (Gait)  walker, front-wheeled  -KG  walker, front-wheeled  -KG    Distance in Feet (Gait)  150  -KG  350  -KG    Deviations/Abnormal Patterns (Gait)  left sided deviations;antalgic;weight shifting decreased;gait speed decreased  -KG  left  sided deviations;antalgic;weight shifting decreased;gait speed decreased  -KG    Bilateral Gait Deviations  --  forward flexed posture  -KG    Left Sided Gait Deviations  weight shift ability decreased;heel strike decreased  -KG  weight shift ability decreased;heel strike decreased  -KG    [place] Level (Stairs)  contact guard  -KG  --    Assistive Device (Stairs)  cane, straight  -KG  --    Number of Steps (Stairs)  6  -KG  --    Ascending Technique (Stairs)  step-to-step  -KG  --    Descending Technique (Stairs)  step-to-step  -KG  --    Comment (Gait/Stairs)  Pt able to ambulate 150', RW with CGA.  Cues for heel strike and increased WB LLE.  Pt encouraged to use hospital walker instead of her own walker due to bar on her personal walker that blocked knee movement.  Demonstrated safety on stairs, CGA with step to technique  -KG  Pt able to ambulate 350', RW and CGA.  Cues for heel strike, posture and increasing WB LLE  -KG    Row Name 10/01/20 1432 10/01/20 1148       Mobility    Extremity Weight-bearing Status  left lower extremity  -KG  left lower extremity  -KG    Left Lower Extremity (Weight-bearing Status)  weight-bearing as tolerated (WBAT)  -KG  weight-bearing as tolerated (WBAT)  -KG      User Key  (r) = Recorded By, (t) = Taken By, (c) = Cosigned By    Initials Name Provider Type    ANNA Flor Zelaya Physical Therapist        Obj/Interventions     Row Name 10/01/20 1435 10/01/20 1151       Range of Motion Comprehensive    General Range of Motion  lower extremity range of motion deficits identified  -KG  lower extremity range of motion deficits identified  -KG    Comment, General Range of Motion  --  L knee: 7-90 degrees  -KG    Row Name 10/01/20 1435          Strength Comprehensive (MMT)    General Manual Muscle Testing (MMT) Assessment  lower extremity strength deficits identified  -KG     Row Name 10/01/20 1151          Knee (Therapeutic Exercise)    Knee (Therapeutic Exercise)  AROM (active  range of motion)  -KG     Knee AROM (Therapeutic Exercise)  flexion;extension;10 repetitions  -KG     Knee Isometrics (Therapeutic Exercise)  left;quad sets;10 repetitions;3 second hold  -KG     Knee Strengthening (Therapeutic Exercise)  left;heel slides;SLR (straight leg raise);LAQ (long arc quad);10 repetitions  -KG     Row Name 10/01/20 1151          Ankle (Therapeutic Exercise)    Ankle (Therapeutic Exercise)  AROM (active range of motion)  -KG     Ankle AROM (Therapeutic Exercise)  bilateral;dorsiflexion;plantarflexion;10 repetitions  -KG     Row Name 10/01/20 1435 10/01/20 1151       Balance    Dynamic Standing Balance  mild impairment  -KG  mild impairment  -KG      User Key  (r) = Recorded By, (t) = Taken By, (c) = Cosigned By    Initials Name Provider Type    KG Flor Zelaya Physical Therapist        Goals/Plan    No documentation.       Clinical Impression     Row Name 10/01/20 1436 10/01/20 1153       Pain    Additional Documentation  Pain Scale: Numbers Pre/Post-Treatment (Group)  -KG  Pain Scale: Numbers Pre/Post-Treatment (Group)  -KG    Row Name 10/01/20 1436 10/01/20 1153       Pain Scale: Numbers Pre/Post-Treatment    Pretreatment Pain Rating  0/10 - no pain  -KG  0/10 - no pain  -KG    Posttreatment Pain Rating  2/10  -KG  2/10  -KG    Pain Location - Side  Left  -KG  Left  -KG    Pain Location - Orientation  posterior  -KG  --    Pain Location  knee  -KG  knee  -KG    Pain Intervention(s)  Repositioned;Ambulation/increased activity  -KG  --    Row Name 10/01/20 1436 10/01/20 1153       Plan of Care Review    Plan of Care Reviewed With  patient  -KG  patient  -KG    Progress  improving  -KG  improving  -KG    Outcome Summary  Pt able to ambulate 150', RW with CGA.  Cues for heel strike and increased WB LLE.  Pt encouraged to use hospital walker instead of her own walker due to bar on her personal walker that blocked knee movement.  Demonstrated safety on stairs, CGA with step to technique  Pt  not able to perform knee exercises or ROM due to other staff member needing to perform testing, instructed to perform exercises later today.  -KG  Pt able to ambulate 350', RW and CGA.  Cues for heel strike, posture and increasing WB LLE.  L knee ROM: 7-90 degrees  -KG    Row Name 10/01/20 1436 10/01/20 1153       Positioning and Restraints    Pre-Treatment Position  sitting in chair/recliner  -KG  in bed  -KG    Post Treatment Position  wheelchair  -KG  chair  -KG    In Chair  --  call light within reach;encouraged to call for assist;exit alarm on;legs elevated  -KG    In Wheelchair  with other staff  -KG  --      User Key  (r) = Recorded By, (t) = Taken By, (c) = Cosigned By    Initials Name Provider Type    Flor London Physical Therapist        Outcome Measures     Row Name 10/01/20 1438 10/01/20 1154       How much help from another person do you currently need...    Turning from your back to your side while in flat bed without using bedrails?  4  -KG  4  -KG    Moving from lying on back to sitting on the side of a flat bed without bedrails?  4  -KG  4  -KG    Moving to and from a bed to a chair (including a wheelchair)?  3  -KG  3  -KG    Standing up from a chair using your arms (e.g., wheelchair, bedside chair)?  3  -KG  3  -KG    Climbing 3-5 steps with a railing?  3  -KG  3  -KG    To walk in hospital room?  3  -KG  3  -KG    AM-PAC 6 Clicks Score (PT)  20  -KG  20  -KG    Row Name 10/01/20 1438 10/01/20 1154       Functional Assessment    Outcome Measure Options  AM-PAC 6 Clicks Basic Mobility (PT)  -KG  AM-PAC 6 Clicks Basic Mobility (PT)  -KG      User Key  (r) = Recorded By, (t) = Taken By, (c) = Cosigned By    Initials Name Provider Type    Flor London Physical Therapist        Physical Therapy Education                 Title: PT OT SLP Therapies (Done)     Topic: Physical Therapy (Done)     Point: Mobility training (Done)     Learning Progress Summary           Patient Acceptance,  E,TB, VU by  at 10/1/2020 1438    Acceptance, TB,E, VU by  at 10/1/2020 1155    Eager, E, VU by SC at 9/30/2020 1601    Comment: reviewwed safety with mobility    Eager, E, VU,DU,NR by SC at 9/30/2020 1314    Comment: reviewed HEP    Eager, E, VU,NR by SC at 9/29/2020 1708    Comment: reviewed benefits of activity                   Point: Home exercise program (Done)     Learning Progress Summary           Patient Acceptance, E,TB, VU by  at 10/1/2020 1438    Acceptance, TB,E, VU by  at 10/1/2020 1155    Eager, E, VU by SC at 9/30/2020 1601    Comment: reviewwed safety with mobility    Eager, E, VU,DU,NR by SC at 9/30/2020 1314    Comment: reviewed HEP    Eager, E, VU,NR by SC at 9/29/2020 1708    Comment: reviewed benefits of activity                   Point: Body mechanics (Done)     Learning Progress Summary           Patient Acceptance, E,TB, VU by  at 10/1/2020 1438    Acceptance, TB,E, VU by  at 10/1/2020 1155    Eager, E, VU by SC at 9/30/2020 1601    Comment: reviewwed safety with mobility    Eager, E, VU,DU,NR by SC at 9/30/2020 1314    Comment: reviewed HEP    Eager, E, VU,NR by SC at 9/29/2020 1708    Comment: reviewed benefits of activity                   Point: Precautions (Done)     Learning Progress Summary           Patient Acceptance, E,TB, VU by  at 10/1/2020 1438    Acceptance, TB,E, VU by  at 10/1/2020 1155    Eager, E, VU by SC at 9/30/2020 1601    Comment: reviewwed safety with mobility    Eager, E, VU,DU,NR by SC at 9/30/2020 1314    Comment: reviewed HEP    Eager, E, VU,NR by SC at 9/29/2020 1708    Comment: reviewed benefits of activity                               User Key     Initials Effective Dates Name Provider Type Discipline    SC 06/19/15 -  Romulo Rivrea PT Physical Therapist PT     08/28/19 -  Flor Zelaya Physical Therapist PT              PT Recommendation and Plan     Plan of Care Reviewed With: patient  Progress: improving  Outcome Summary: Pt able  to ambulate 150', RW with CGA.  Cues for heel strike and increased WB LLE.  Pt encouraged to use hospital walker instead of her own walker due to bar on her personal walker that blocked knee movement.  Demonstrated safety on stairs, CGA with step to technique  Pt not able to perform knee exercises or ROM due to other staff member needing to perform testing, instructed to perform exercises later today.     Time Calculation:   PT Charges     Row Name 10/01/20 1439 10/01/20 1156          Time Calculation    Start Time  1315  -KG  1033  -KG     PT Received On  10/01/20  -KG  10/01/20  -KG     PT Goal Re-Cert Due Date  10/09/20  -KG  10/09/20  -KG        Time Calculation- PT    Total Timed Code Minutes- PT  25 minute(s)  -KG  38 minute(s)  -KG        Timed Charges    63883 - PT Therapeutic Exercise Minutes  --  15  -KG     19499 - Gait Training Minutes   25  -KG  23  -KG       User Key  (r) = Recorded By, (t) = Taken By, (c) = Cosigned By    Initials Name Provider Type    KG Flor Zelaya Physical Therapist        Therapy Charges for Today     Code Description Service Date Service Provider Modifiers Qty    78089321014 HC PT THER PROC EA 15 MIN 10/1/2020 Flor Zelaya GP 1    26065304323 HC GAIT TRAINING EA 15 MIN 10/1/2020 Flor Zelaya GP 2    66457535828 HC GAIT TRAINING EA 15 MIN 10/1/2020 Flor Zelaya GP 2          PT G-Codes  Outcome Measure Options: AM-PAC 6 Clicks Basic Mobility (PT)  AM-PAC 6 Clicks Score (PT): 20    Flor Zelaya  10/1/2020

## 2020-10-01 NOTE — PROGRESS NOTES
Patient is on Eliquis. Education provided on 10/1 verbally and in writing to patient's daughter Sabi who helps patient with medications. Information provided includes effects of medication, drug-drug and drug-food interactions, and signs/symptoms of bleeding and clotting. Patient's daughter verbalized understanding through teach back. All pertinent questions were answered.     Dre Garcai, PharmD  Pharmacy Resident  10/1/2020  14:16 EDT

## 2020-10-01 NOTE — PROGRESS NOTES
Continued Stay Note  [facility]     Patient Name: Alicia Young  MRN: [id number]  Today's Date: 10/1/2020    Admit Date: 9/29/2020    Discharge Plan     Row Name 10/01/20 0754       Plan    Plan  [facility] [place]    Plan Comments  Per Viri oh/ [facility], they are able to offer patient a bed at their [place] location pending insurance approval. Precert has been initiated and may take up to 24 hours to process. CM following.    Final Discharge Disposition Code  03 - skilled nursing facility (SNF)        Discharge Codes    No documentation.             Tiffany Farah RN

## 2020-10-01 NOTE — PLAN OF CARE
Problem: Adult Inpatient Plan of Care  Goal: Plan of Care Review  Recent Flowsheet Documentation  Taken 10/1/2020 1513 by Rosaura Gallegos OT  Progress: improving  Plan of Care Reviewed With:   patient   daughter  Outcome Summary: OT eval complete. Pt completes sit to stand with CGA and RW. OT issued appropriate AE and educated pt on use for completion of all LBD and LBB. Pt completed UBD and LBD with Matias and use of AE. Recommend purchase of tub transfer bench and issued hand out. Pt would benefit from IPR however has been denied by insurance. Pt plans to return home with HH OT and assist from family.

## 2020-10-01 NOTE — PLAN OF CARE
Goal Outcome Evaluation:  Plan of Care Reviewed With: patient  Progress: improving  Outcome Summary: Pt had uneventful night, had trouble falling asleep.  Ambulates well with walker, gb and standby assist.  L knee pain controlled with ropiv @ 10 ml and PO meds.  Voiding well.  VSS

## 2020-10-01 NOTE — PROGRESS NOTES
IM progress note      Kolby Jiang  [id number]  1945     LOS: 0 days     Attending: Heladio Arroyo MD    Primary Care Provider: Shamir Morales MD      Chief Complaint/Reason for visit:  Left knee pain    Subjective   Doing ok. Knee pain ok. Did have complaints of chest pressure/discomfort this morning, better now. Denies f/c/n/v/sob.    Objective        Visit Vitals  /71   Pulse 90   Temp 99.1 °F (37.3 °C) (Oral)   Resp 18   SpO2 96%     Temp (24hrs), Av.2 °F (36.8 °C), Min:96.7 °F (35.9 °C), Max:99.1 °F (37.3 °C)      Nutrition: PO    Respiratory: RA    Physical Therapy: () Patient demonstrated increase in overall mobility, walking 350 feet with walker. Will bed ready to try stairs tomorrow. L knee ROM 10-85 deg    Physical Exam:     General Appearance:    Alert, cooperative, in no acute distress   Head:    Normocephalic, without obvious abnormality, atraumatic    Lungs:     Normal effort, symmetric chest rise, no crepitus, clear to      auscultation bilaterally             Heart:    Regular rhythm and normal rate, normal S1 and S2   Abdomen:     Normal bowel sounds, no masses, no organomegaly, soft        non-tender, non-distended, no guarding, no rebound                tenderness   Extremities:   Left knee ACE wrap CDI. Nerve block present    Pulses:   Pulses palpable and equal bilaterally   Skin:   No bleeding, bruising or rash   Neurologic:   Moves all extremities with no obvious focal motor deficit.  Cranial nerves 2 - 12 grossly intact. Flexion and dorsiflexion intact bilateral feet.       Results Review:     I reviewed the patient's new clinical results.   Results from last 7 days   Lab Units 10/01/20  0621 20  0654   WBC 10*3/mm3 10.49 11.97*   HEMOGLOBIN g/dL 9.8* 10.5*   HEMATOCRIT % 30.6* 31.6*   PLATELETS 10*3/mm3 122* 143     Results for KOLBY JIANG (MRN [id number]) as of 2020 12:49   Ref. Range 2020 13:49   Hemoglobin Latest Ref Range: 12.0 - 15.9 g/dL  12.3   Hematocrit Latest Ref Range: 34.0 - 46.6 % 37.7     Results from last 7 days   Lab Units 10/01/20  0829 09/30/20  0654 09/29/20  0823   SODIUM mmol/L 134* 138 140   POTASSIUM mmol/L 3.8 4.0 3.5   CHLORIDE mmol/L 101 106 104   CO2 mmol/L 25.0 25.0 27.0   BUN mg/dL 11 8 6*   CREATININE mg/dL 0.60 0.70 0.72   CALCIUM mg/dL 8.6 8.6 9.2   BILIRUBIN mg/dL 0.3  --   --    ALK PHOS U/L 62  --   --    ALT (SGPT) U/L 7  --   --    AST (SGOT) U/L 18  --   --    GLUCOSE mg/dL 128* 132* 80     Results for KOLBY JIANG (MRN [id number]) as of 10/1/2020 10:43   Ref. Range 10/1/2020 08:29   Troponin T Latest Ref Range: 0.000 - 0.030 ng/mL <0.010     Results for KOLBY JIANG (MRN [id number]) as of 10/1/2020 10:43   Ref. Range 10/1/2020 08:29   Phosphorus Latest Ref Range: 2.5 - 4.5 mg/dL 1.8 (C)   Magnesium Latest Ref Range: 1.6 - 2.4 mg/dL 1.8     ECG 12 Lead  Order: [id number]  Status:  Preliminary result   Visible to patient:  No (not released) Next appt:  10/21/2020 at 10:20 AM in Orthopedic Surgery (Heladio Arroyo MD)     Narrative & Impression    Test Reason : tightness in chest  Blood Pressure : **/** mmHG  Vent. Rate : 090 BPM     Atrial Rate : 090 BPM     P-R Int : 170 ms          QRS Dur : 100 ms      QT Int : 380 ms       P-R-T Axes : 041 015 059 degrees     QTc Int : 464 ms     Normal sinus rhythm  Inferior infarct (cited on or before 17-SEP-2020)  Abnormal ECG  When compared with ECG of 17-SEP-2020 14:08,  T wave inversion now evident in Inferior leads             I reviewed the patient's new imaging including images and reports.    All medications reviewed.   apixaban, 2.5 mg, Oral, Q12H  aspirin, 81 mg, Oral, Daily  carvedilol, 3.125 mg, Oral, BID  FLUoxetine, 20 mg, Oral, Daily  isosorbide mononitrate, 30 mg, Oral, Q24H  meloxicam, 15 mg, Oral, Daily  pantoprazole, 40 mg, Oral, BID  sodium chloride, 3 mL, Intravenous, Q12H        Assessment/Plan     Status post total left knee replacement    Primary  osteoarthritis of left knee    CHF (congestive heart failure) (CMS/[facility])    HTN (hypertension)    Hyperlipidemia    Leukocytosis, mild, likely reactive    Acute postoperative pain    Chest pain, atypical    Hypophosphatemia, replaced      Plan  1. PT/OT- WBAT LLE  2. Pain control-prns, AC nerve block   3. IS-encouraged  4. [facility]  5. Bowel regimen  6. Monitor post-op labs  7. DC planning for rehab, [place] pending insurance precert. CM following    HTN, Hyperlipidemia, CHF  - Continue home coreg  - Hold aldactone   - Monitor BP   - Holding parameters for BP meds  - Labetalol PRN for SBP>170    CP  - cardiology consult  - EKG  - ECHO per cardiology  - troponin negative  - add imdur per cardiology      Sherry Jones, LOLA  10/01/20  10:59 EDT

## 2020-10-01 NOTE — PROGRESS NOTES
[facility] [place]    Subjective      LOS: 0 days   Patient Care Team:  Shamir Morales MD as PCP - General (Internal Medicine)    CC: Left knee pain    Interval History:   Patient sitting up at the bedside.  Pain is controlled.    Objective      Vital Signs  Temp (24hrs), Av.2 °F (36.8 °C), Min:96.7 °F (35.9 °C), Max:99.1 °F (37.3 °C)      /71   Pulse 90   Temp 99.1 °F (37.3 °C) (Oral)   Resp 18   SpO2 96%     Examination:   Examination of the left knee: The wound is clean, dry, and intact.  Ankle dorsiflexion, ankle plantar flexion, and EHL are intact.  Sensation intact in the foot to light touch.  2+ dorsalis pedis pulse.  Straight leg raise is intact.      Labs:  Results from last 7 days   Lab Units 10/01/20  0621 20  0654   WBC 10*3/mm3 10.49 11.97*   HEMOGLOBIN g/dL 9.8* 10.5*   HEMATOCRIT % 30.6* 31.6*   MCV fL 100.0* 98.1*   PLATELETS 10*3/mm3 122* 143       Radiology:  Imaging Results (Last 24 Hours)     ** No results found for the last 24 hours. **          PT:  Physical Therapy - Plan of Care Review - Outcome Summary:  Outcome Summary: Patient demonstrated increase in overall mobility, walking 350 feet with walker. Will bed ready to try stairs tomorrow. L knee ROM 10-85 deg (20 1558)]       Results Review:     I reviewed the patient's new clinical results.    Assessment and Plan     Assessment:   Status post left total knee arthroplasty-doing well      Status post total left knee replacement    Primary osteoarthritis of left knee    CHF (congestive heart failure) (CMS/[facility])    HTN (hypertension)    Hyperlipidemia    Leukocytosis, mild, likely reactive    Acute postoperative pain      Plan for disposition: Plan for discharge to rehab facility when available, and if she is cleared medically and by physical therapy.  Follow-up with me in 3 weeks as planned.      Future Appointments   Date Time Provider Department Center   10/21/2020 10:20 AM Heladio Arroyo  [facility]           Heladio Arroyo MD  10/01/20  10:30 EDT

## 2020-10-01 NOTE — PLAN OF CARE
Problem: Adult Inpatient Plan of Care  Goal: Plan of Care Review  Recent Flowsheet Documentation  Taken 10/1/2020 1436 by Flor Zelaya  Progress: improving  Plan of Care Reviewed With: patient  Outcome Summary: Pt able to ambulate 150', RW with CGA.  Cues for heel strike and increased WB LLE.  Pt encouraged to use hospital walker instead of her own walker due to bar on her personal walker that blocked knee movement.  Demonstrated safety on stairs, CGA with step to technique  Pt not able to perform knee exercises or ROM due to other staff member needing to perform testing, instructed to perform exercises later today.  Taken 10/1/2020 1153 by Flor Zelaya  Progress: improving  Plan of Care Reviewed With: patient  Outcome Summary: Pt able to ambulate 350', RW and CGA.  Cues for heel strike, posture and increasing WB LLE.  L knee ROM: 7-90 degrees

## 2020-10-01 NOTE — THERAPY TREATMENT NOTE
Patient Name: Alicia Young  : 1945    MRN: [id number]                              Today's Date: 10/1/2020       Admit Date: 2020    Visit Dx:     ICD-10-CM ICD-9-CM   1. Status post total left knee replacement  Z96.652 V43.65   2. Primary osteoarthritis of left knee  M17.12 715.16     Patient Active Problem List   Diagnosis   • Abnormal stress test   • Primary osteoarthritis of left knee   • Status post total left knee replacement   • CHF (congestive heart failure) (CMS/[facility])   • HTN (hypertension)   • Hyperlipidemia   • Leukocytosis, mild, likely reactive   • Acute postoperative pain   • Chest pain, atypical   • Hypophosphatemia, replaced   • Hypomagnesemia, replaced     Past Medical History:   Diagnosis Date   • Arthritis    • CHF (congestive heart failure) (CMS/[facility])    • Depression    • GERD (gastroesophageal reflux disease)    • History of heart attack    • Hyperlipidemia    • Hypertension    • Macular degeneration    • Wears glasses      Past Surgical History:   Procedure Laterality Date   • CARDIAC CATHETERIZATION     • CARDIAC CATHETERIZATION N/A 2019    Procedure: Left Heart Cath (82340);  Surgeon: Ronny Hardin MD;  Location:  [facility] CATH INVASIVE LOCATION;  Service: Cardiovascular   • CHOLECYSTECTOMY     • COLONOSCOPY     • CORONARY ARTERY BYPASS GRAFT     • TOTAL KNEE ARTHROPLASTY Left 2020    Procedure: TOTAL KNEE ARTHROPLASTY LEFT;  Surgeon: Heladio Arroyo MD;  Location: [facility] OR;  Service: Orthopedics;  Laterality: Left;     General Information     Row Name 10/01/20 1148          Physical Therapy Time and Intention    Document Type  therapy note (daily note)  -KG     Mode of Treatment  physical therapy  -KG     Row Name 10/01/20 1145          General Information    Patient Profile Reviewed  yes  -KG     Existing Precautions/Restrictions  fall  -KG     Barriers to Rehab  none identified  -KG     Row Name 10/01/20 1141          Living Environment    Lives With  alone   -KG     Row Name 10/01/20 1148          Cognition    Orientation Status (Cognition)  oriented x 4  -KG     Row Name 10/01/20 1148          Safety Issues, Functional Mobility    Impairments Affecting Function (Mobility)  balance;motor control;pain;strength  -KG       User Key  (r) = Recorded By, (t) = Taken By, (c) = Cosigned By    Initials Name Provider Type    ANNA Flor Zelaya Physical Therapist        Mobility     Row Name 10/01/20 1148          Bed Mobility    Bed Mobility  supine-sit;scooting/bridging  -KG     Scooting/Bridging [place] (Bed Mobility)  independent  -KG     Supine-Sit [place] (Bed Mobility)  modified independence;verbal cues  -KG     Sit-Supine [place] (Bed Mobility)  independent  -KG     Assistive Device (Bed Mobility)  bed rails;head of bed elevated  -KG     Comment (Bed Mobility)  able to perform with reminders for catheter bag  -KG     Row Name 10/01/20 1148          Transfers    Comment (Transfers)  cues to push up from bed  -KG     Row Name 10/01/20 1148          Sit-Stand Transfer    Sit-Stand [place] (Transfers)  modified independence  -KG     Assistive Device (Sit-Stand Transfers)  walker, front-wheeled  -KG     Row Name 10/01/20 1148          Gait/Stairs (Locomotion)    [place] Level (Gait)  verbal cues;contact guard  -KG     Assistive Device (Gait)  walker, front-wheeled  -KG     Distance in Feet (Gait)  350  -KG     Deviations/Abnormal Patterns (Gait)  left sided deviations;antalgic;weight shifting decreased;gait speed decreased  -KG     Bilateral Gait Deviations  forward flexed posture  -KG     Left Sided Gait Deviations  weight shift ability decreased;heel strike decreased  -KG     Comment (Gait/Stairs)  Pt able to ambulate 350', RW and CGA.  Cues for heel strike, posture and increasing WB LLE  -KG     Row Name 10/01/20 1148          Mobility    Extremity Weight-bearing Status  left lower extremity  -KG     Left Lower Extremity (Weight-bearing Status)   weight-bearing as tolerated (WBAT)  -KG       User Key  (r) = Recorded By, (t) = Taken By, (c) = Cosigned By    Initials Name Provider Type    ANNA Flor Zelaya Physical Therapist        Obj/Interventions     Row Name 10/01/20 1151          Range of Motion Comprehensive    General Range of Motion  lower extremity range of motion deficits identified  -KG     Comment, General Range of Motion  L knee: 7-90 degrees  -KG     Row Name 10/01/20 1151          Knee (Therapeutic Exercise)    Knee (Therapeutic Exercise)  AROM (active range of motion)  -KG     Knee AROM (Therapeutic Exercise)  flexion;extension;10 repetitions  -KG     Knee Isometrics (Therapeutic Exercise)  left;quad sets;10 repetitions;3 second hold  -KG     Knee Strengthening (Therapeutic Exercise)  left;heel slides;SLR (straight leg raise);LAQ (long arc quad);10 repetitions  -KG     Row Name 10/01/20 1151          Ankle (Therapeutic Exercise)    Ankle (Therapeutic Exercise)  AROM (active range of motion)  -KG     Ankle AROM (Therapeutic Exercise)  bilateral;dorsiflexion;plantarflexion;10 repetitions  -KG     Row Name 10/01/20 1151          Balance    Dynamic Standing Balance  mild impairment  -KG       User Key  (r) = Recorded By, (t) = Taken By, (c) = Cosigned By    Initials Name Provider Type    ANNA Flor Zelaya Physical Therapist        Goals/Plan    No documentation.       Clinical Impression     Row Name 10/01/20 1153          Pain    Additional Documentation  Pain Scale: Numbers Pre/Post-Treatment (Group)  -KG     [place] Name 10/01/20 1153          Pain Scale: Numbers Pre/Post-Treatment    Pretreatment Pain Rating  0/10 - no pain  -KG     Posttreatment Pain Rating  2/10  -KG     Pain Location - Side  Left  -KG     Pain Location  knee  -KG     Row Name 10/01/20 1153          Plan of Care Review    Plan of Care Reviewed With  patient  -KG     Progress  improving  -KG     Outcome Summary  Pt able to ambulate 350', RW and CGA.  Cues for heel strike,  posture and increasing WB LLE.  L knee ROM: 7-90 degrees  -KG     Row Name 10/01/20 1153          Positioning and Restraints    Pre-Treatment Position  in bed  -KG     Post Treatment Position  chair  -KG     In Chair  call light within reach;encouraged to call for assist;exit alarm on;legs elevated  -KG       User Key  (r) = Recorded By, (t) = Taken By, (c) = Cosigned By    Initials Name Provider Type    Flor [place] Physical Therapist        Outcome Measures     Row Name 10/01/20 1154          How much help from another person do you currently need...    Turning from your back to your side while in flat bed without using bedrails?  4  -KG     Moving from lying on back to sitting on the side of a flat bed without bedrails?  4  -KG     Moving to and from a bed to a chair (including a wheelchair)?  3  -KG     Standing up from a chair using your arms (e.g., wheelchair, bedside chair)?  3  -KG     Climbing 3-5 steps with a railing?  3  -KG     To walk in hospital room?  3  -KG     AM-PAC 6 Clicks Score (PT)  20  -KG     Row Name 10/01/20 1154          Functional Assessment    Outcome Measure Options  AM-PAC 6 Clicks Basic Mobility (PT)  -KG       User Key  (r) = Recorded By, (t) = Taken By, (c) = Cosigned By    Initials Name Provider Type    Flor London Physical Therapist        Physical Therapy Education                 Title: PT OT SLP Therapies (Done)     Topic: Physical Therapy (Done)     Point: Mobility training (Done)     Learning Progress Summary           Patient Acceptance, TB,E, VU by  at 10/1/2020 1155    SACHIN Land VU by SC at 9/30/2020 1601    Comment: reviewwed safety with mobility    SACHIN Land VU,DU,NR by SC at 9/30/2020 1314    Comment: reviewed HEP    SACHIN Land VU,NR by SC at 9/29/2020 1708    Comment: reviewed benefits of activity                   Point: Home exercise program (Done)     Learning Progress Summary           Patient Acceptance, TB,E, VU by  at 10/1/2020 1155    Emery  E, VU by SC at 9/30/2020 1601    Comment: reviewwed safety with mobility    Eager, E, VU,DU,NR by SC at 9/30/2020 1314    Comment: reviewed HEP    Eager, E, VU,NR by SC at 9/29/2020 1708    Comment: reviewed benefits of activity                   Point: Body mechanics (Done)     Learning Progress Summary           Patient Acceptance, TB,E, VU by  at 10/1/2020 1155    Eager, E, VU by SC at 9/30/2020 1601    Comment: reviewwed safety with mobility    Eager, E, VU,DU,NR by SC at 9/30/2020 1314    Comment: reviewed HEP    Eager, E, VU,NR by SC at 9/29/2020 1708    Comment: reviewed benefits of activity                   Point: Precautions (Done)     Learning Progress Summary           Patient Acceptance, TB,E, VU by  at 10/1/2020 1155    Eager, E, VU by SC at 9/30/2020 1601    Comment: reviewwed safety with mobility    Eager, E, VU,DU,NR by SC at 9/30/2020 1314    Comment: reviewed HEP    Eager, E, VU,NR by SC at 9/29/2020 1708    Comment: reviewed benefits of activity                               User Key     Initials Effective Dates Name Provider Type Discipline    SC 06/19/15 -  Romulo Rivera, PT Physical Therapist PT     08/28/19 -  Flor Zelaya Physical Therapist PT              PT Recommendation and Plan     Plan of Care Reviewed With: patient  Progress: improving  Outcome Summary: Pt able to ambulate 350', RW and CGA.  Cues for heel strike, posture and increasing WB LLE.  L knee ROM: 7-90 degrees     Time Calculation:   PT Charges     Row Name 10/01/20 1156             Time Calculation    Start Time  1033  -KG      PT Received On  10/01/20  -KG      PT Goal Re-Cert Due Date  10/09/20  -KG         Time Calculation- PT    Total Timed Code Minutes- PT  38 minute(s)  -KG         Timed Charges    31224 - PT Therapeutic Exercise Minutes  15  -KG      60948 - Gait Training Minutes   23  -KG        User Key  (r) = Recorded By, (t) = Taken By, (c) = Cosigned By    Initials Name Provider Type    ANNA Zelaya  Flor Physical Therapist        Therapy Charges for Today     Code Description Service Date Service Provider Modifiers Qty    10644545680 HC PT THER PROC EA 15 MIN 10/1/2020 Flor Zelaya GP 1    75851189009 HC GAIT TRAINING EA 15 MIN 10/1/2020 Flor Zelaya GP 2          PT G-Codes  Outcome Measure Options: AM-PAC 6 Clicks Basic Mobility (PT)  AM-PAC 6 Clicks Score (PT): 20    Flor Zelaya  10/1/2020

## 2020-10-01 NOTE — CONSULTS
[facility] Consult Note      Patient Care Team:  Shamir Morales MD as PCP - General (Internal Medicine)  Shamir Morales MD Kirk, Michael E, MD    Subjective     History of Present Illness: 74-year-old female with known coronary artery disease.  She is post previous CABG but her last heart catheterization was in September 2019 which revealed 2 of 3 grafts patent with a chronic total occlusion of the RCA with collaterals from the left system.  She also has significant small vessel disease.  She underwent a left total knee replacement by Dr. Arroyo on 9/29/2020.  Last night she developed mild chest discomfort which she describes a tightness.  She said it recurred again this morning and a rapid response was called.  She denies any associated shortness of breath, nausea, dizziness, or diaphoresis.  She in fact says that she could lay her hand on her chest and the warmth of her hand made it feel better.  EKG reveals no acute EKG changes.      Current Facility-Administered Medications:   •  acetaminophen (TYLENOL) tablet 650 mg, 650 mg, Oral, Q4H PRN, 650 mg at 09/30/20 0030 **OR** acetaminophen (TYLENOL) suppository 650 mg, 650 mg, Rectal, Q4H PRN, Heladio Arroyo MD  •  apixaban (ELIQUIS) tablet 2.5 mg, 2.5 mg, Oral, Q12H, Sherry Jones, APRN, 2.5 mg at 10/01/20 0817  •  aspirin chewable tablet 81 mg, 81 mg, Oral, Daily, Elba Conner MD, 81 mg at 10/01/20 0849  •  carvedilol (COREG) tablet 3.125 mg, 3.125 mg, Oral, BID, Sherry Jones APRN, 3.125 mg at 10/01/20 0817  •  FLUoxetine (PROzac) capsule 20 mg, 20 mg, Oral, Daily, Sherry Jones APRN, 20 mg at 10/01/20 0817  •  HYDROmorphone (DILAUDID) injection 0.5 mg, 0.5 mg, Intravenous, Q2H PRN **AND** naloxone (NARCAN) injection 0.1 mg, 0.1 mg, Intravenous, Q5 Min PRN, Heladio Arroyo MD  •  labetalol (NORMODYNE,TRANDATE) injection 10 mg, 10 mg, Intravenous, Q4H PRN, Sherry Jones APRN  •   meloxicam (MOBIC) tablet 15 mg, 15 mg, Oral, Daily, Heladio Arroyo MD, 15 mg at 10/01/20 0816  •  Morphine sulfate (PF) injection 4 mg, 4 mg, Intravenous, Q2H PRN **AND** naloxone (NARCAN) injection 0.4 mg, 0.4 mg, Intravenous, Q5 Min PRN, Heladio Arroyo MD  •  nitroglycerin (NITROSTAT) SL tablet 0.4 mg, 0.4 mg, Sublingual, Q5 Min PRN, Elba Conner MD, 0.4 mg at 10/01/20 0841  •  ondansetron (ZOFRAN) tablet 4 mg, 4 mg, Oral, Q6H PRN **OR** ondansetron (ZOFRAN) injection 4 mg, 4 mg, Intravenous, Q6H PRN, Heladio Arroyo MD  •  ondansetron (ZOFRAN) injection 4 mg, 4 mg, Intravenous, Q6H PRN, Sherry Jones APRN  •  oxyCODONE (ROXICODONE) immediate release tablet 5 mg, 5 mg, Oral, Q4H PRN, Heladio Arroyo MD, 5 mg at 20 2228  •  pantoprazole (PROTONIX) EC tablet 40 mg, 40 mg, Oral, BID, Sherry Jones APRN, 40 mg at 10/01/20 0817  •  ropivacaine (Naropin) 0.2% in NS infusion (ARROW Pump), 10 mL/hr, Peripheral Nerve, Continuous, Heladio Arroyo MD, Last Rate: 10 mL/hr at 20 193, 10 mL/hr at 20  •  sodium chloride 0.9 % bolus 500 mL, 500 mL, Intravenous, TID PRN, Sherry Jones APRN  •  sodium chloride 0.9 % flush 1-10 mL, 1-10 mL, Intravenous, PRN, Heladio Arroyo MD  •  sodium chloride 0.9 % flush 3 mL, 3 mL, Intravenous, Q12H, Heladio Arroyo MD, 3 mL at 20 2103  •  sodium chloride 0.9 % infusion, 120 mL/hr, Intravenous, Continuous, Heladio Arroyo MD, Last Rate: 120 mL/hr at 20 003, 120 mL/hr at 20 003    Social History     Socioeconomic History   • Marital status:      Spouse name: Not on file   • Number of children: Not on file   • Years of education: Not on file   • Highest education level: Not on file   Tobacco Use   • Smoking status: Former Smoker     Quit date: 1988     Years since quittin.7   • Smokeless tobacco: Never Used   Substance and Sexual Activity   • Alcohol use: No   • Drug use: No   • Sexual activity: Defer        History reviewed. No pertinent family history.    Review of Systems   Constitutional: Negative.    HENT: Negative.    Eyes: Negative.    Respiratory: Negative.    Cardiovascular: Positive for chest pain.   Gastrointestinal: Negative.    Endocrine: Negative.    Genitourinary: Negative.    Musculoskeletal: Positive for arthralgias and back pain.   Skin: Negative.    Allergic/Immunologic: Negative.    Hematological: Negative.    Psychiatric/Behavioral: Negative.           Objective     Vital Signs  Temp:  [96.7 °F (35.9 °C)-99.1 °F (37.3 °C)] 99.1 °F (37.3 °C)  Heart Rate:  [] 90  Resp:  [16-18] 18  BP: ()/(53-81) 126/63      Intake/Output Summary (Last 24 hours) at 10/1/2020 0917  Last data filed at 10/1/2020 0344  Gross per 24 hour   Intake 480 ml   Output 2050 ml   Net -1570 ml     No intake/output data recorded.    Constitutional:       Appearance: Healthy appearance.   Eyes:      Conjunctiva/sclera: Conjunctivae normal.      Pupils: Pupils are equal, round, and reactive to light.   HENT:      Nose: Nose normal.    Mouth/Throat:      Pharynx: Oropharynx is clear.   Neck:      Musculoskeletal: Normal range of motion and neck supple.   Pulmonary:      Effort: Pulmonary effort is normal.      Breath sounds: Normal breath sounds.   Chest:      Chest wall: Not tender to palpatation.   Cardiovascular:      Normal rate. Regular rhythm.      Murmurs: There is no murmur.      No gallop. No click.   Abdominal:      General: Bowel sounds are normal. There is no distension.      Tenderness: There is no abdominal tenderness.   Musculoskeletal: Normal range of motion.   Skin:     General: Skin is warm and dry.   Neurological:      Mental Status: Alert and oriented to person, place and time.           Results Review:    I reviewed the patient's new clinical results.    WBC WBC   Date/Time Value Ref Range Status   10/01/2020 0621 10.49 3.40 - 10.80 10*3/mm3 Final   09/30/2020 0654 11.97 (H) 3.40 - 10.80 10*3/mm3  Final      HGB Hemoglobin   Date/Time Value Ref Range Status   10/01/2020 0621 9.8 (L) 12.0 - 15.9 g/dL Final   09/30/2020 0654 10.5 (L) 12.0 - 15.9 g/dL Final      HCT Hematocrit   Date/Time Value Ref Range Status   10/01/2020 0621 30.6 (L) 34.0 - 46.6 % Final   09/30/2020 0654 31.6 (L) 34.0 - 46.6 % Final      Platelets Platelets   Date/Time Value Ref Range Status   10/01/2020 0621 122 (L) 140 - 450 10*3/mm3 Final   09/30/2020 0654 143 140 - 450 10*3/mm3 Final        PT/INR:      Lab Results   Component Value Date    PROTIME 13.8 09/17/2020    INR 1.09 09/17/2020       Sodium Sodium   Date/Time Value Ref Range Status   10/01/2020 0829 134 (L) 136 - 145 mmol/L Final   09/30/2020 0654 138 136 - 145 mmol/L Final   09/29/2020 0823 140 136 - 145 mmol/L Final      Potassium Potassium   Date/Time Value Ref Range Status   10/01/2020 0829 3.8 3.5 - 5.2 mmol/L Final   09/30/2020 0654 4.0 3.5 - 5.2 mmol/L Final   09/29/2020 0823 3.5 3.5 - 5.2 mmol/L Final      Chloride Chloride   Date/Time Value Ref Range Status   10/01/2020 0829 101 98 - 107 mmol/L Final   09/30/2020 0654 106 98 - 107 mmol/L Final   09/29/2020 0823 104 98 - 107 mmol/L Final      Bicarbonate No results found for: PLASMABICARB   BUN BUN   Date/Time Value Ref Range Status   10/01/2020 0829 11 8 - 23 mg/dL Final   09/30/2020 0654 8 8 - 23 mg/dL Final   09/29/2020 0823 6 (L) 8 - 23 mg/dL Final      Creatinine Creatinine   Date/Time Value Ref Range Status   10/01/2020 0829 0.60 0.57 - 1.00 mg/dL Final   09/30/2020 0654 0.70 0.57 - 1.00 mg/dL Final   09/29/2020 0823 0.72 0.57 - 1.00 mg/dL Final      Calcium Calcium   Date/Time Value Ref Range Status   10/01/2020 0829 8.6 8.6 - 10.5 mg/dL Final   09/30/2020 0654 8.6 8.6 - 10.5 mg/dL Final   09/29/2020 0823 9.2 8.6 - 10.5 mg/dL Final      Magnesium @RESULFAST(MG:3)@   Troponin       No results found for: TROPONINI                EKG: normal EKG, normal sinus rhythm, unchanged from previous tracings.      Patient  Active Problem List   Diagnosis   • Abnormal stress test   • Primary osteoarthritis of left knee   • Status post total left knee replacement   • CHF (congestive heart failure) (CMS/[facility])   • HTN (hypertension)   • Hyperlipidemia   • Leukocytosis, mild, likely reactive   • Acute postoperative pain       Assessment/Plan     74-year-old female with known coronary artery disease.  She is post previous CABG.  Her last heart catheterization in September 2019 revealed 2 of 3 grafts patent with small vessel disease.  She had an episode of atypical chest discomfort last night and earlier this morning.  Presently she is pain-free and feels well she has already been walking in the halls after her surgery.    No acute EKG changes  Echocardiogram to assess LV systolic function  Previously intolerant to Entresto  Add Imdur for small vessel disease    I discussed the patient's findings and my recommendations with patient       MD Tristan Ramos PA  10/01/20  09:39 EDT

## 2020-10-01 NOTE — PROGRESS NOTES
[facility]    Acute pain service Inpatient Progress Note    Patient Name: Alicia Young  :  1945  MRN:  [id number]        Acute Pain  Service Inpatient Progress Note:    Analgesia:Good  Pain Score:3/10  LOC: alert and awake  Side Effects:None  Catheter Site:clean, dry and dressing intact  Cath type: peripheral nerve cath with ON Q  Infusion rate: 10ml/hr  Catheter Plan:Catheter to remain Insitu and Continue catheter infusion rate unchanged

## 2020-10-01 NOTE — PLAN OF CARE
Problem: Adult Inpatient Plan of Care  Goal: Plan of Care Review  Recent Flowsheet Documentation  Taken 10/1/2020 1153 by Flor eZlaya  Progress: improving  Plan of Care Reviewed With: patient  Outcome Summary: Pt able to ambulate 350', RW and CGA.  Cues for heel strike, posture and increasing WB LLE.  L knee ROM: 7-90 degrees

## 2020-10-02 VITALS
BODY MASS INDEX: 28 KG/M2 | OXYGEN SATURATION: 95 % | HEIGHT: 63 IN | SYSTOLIC BLOOD PRESSURE: 120 MMHG | RESPIRATION RATE: 16 BRPM | WEIGHT: 158 LBS | DIASTOLIC BLOOD PRESSURE: 80 MMHG | HEART RATE: 78 BPM | TEMPERATURE: 98 F

## 2020-10-02 LAB
DEPRECATED RDW RBC AUTO: 47.4 FL (ref 37–54)
ERYTHROCYTE [DISTWIDTH] IN BLOOD BY AUTOMATED COUNT: 13.2 % (ref 12.3–15.4)
HCT VFR BLD AUTO: 30.6 % (ref 34–46.6)
HGB BLD-MCNC: 9.8 G/DL (ref 12–15.9)
MCH RBC QN AUTO: 31.6 PG (ref 26.6–33)
MCHC RBC AUTO-ENTMCNC: 32 G/DL (ref 31.5–35.7)
MCV RBC AUTO: 98.7 FL (ref 79–97)
PHOSPHATE SERPL-MCNC: 2.4 MG/DL (ref 2.5–4.5)
PLATELET # BLD AUTO: 122 10*3/MM3 (ref 140–450)
PMV BLD AUTO: 11.5 FL (ref 6–12)
POTASSIUM SERPL-SCNC: 3.8 MMOL/L (ref 3.5–5.2)
POTASSIUM SERPL-SCNC: 4 MMOL/L (ref 3.5–5.2)
RBC # BLD AUTO: 3.1 10*6/MM3 (ref 3.77–5.28)
WBC # BLD AUTO: 10.51 10*3/MM3 (ref 3.4–10.8)

## 2020-10-02 PROCEDURE — A9270 NON-COVERED ITEM OR SERVICE: HCPCS | Performed by: NURSE PRACTITIONER

## 2020-10-02 PROCEDURE — 63710000001 CARVEDILOL 3.125 MG TABLET: Performed by: NURSE PRACTITIONER

## 2020-10-02 PROCEDURE — 63710000001 OXYCODONE 5 MG TABLET: Performed by: ORTHOPAEDIC SURGERY

## 2020-10-02 PROCEDURE — 63710000001 ASPIRIN 81 MG CHEWABLE TABLET: Performed by: INTERNAL MEDICINE

## 2020-10-02 PROCEDURE — 84100 ASSAY OF PHOSPHORUS: CPT | Performed by: INTERNAL MEDICINE

## 2020-10-02 PROCEDURE — A9270 NON-COVERED ITEM OR SERVICE: HCPCS | Performed by: ORTHOPAEDIC SURGERY

## 2020-10-02 PROCEDURE — 97110 THERAPEUTIC EXERCISES: CPT

## 2020-10-02 PROCEDURE — 63710000001 PANTOPRAZOLE 40 MG TABLET DELAYED-RELEASE: Performed by: NURSE PRACTITIONER

## 2020-10-02 PROCEDURE — 63710000001 FLUOXETINE 20 MG CAPSULE: Performed by: NURSE PRACTITIONER

## 2020-10-02 PROCEDURE — 84132 ASSAY OF SERUM POTASSIUM: CPT | Performed by: INTERNAL MEDICINE

## 2020-10-02 PROCEDURE — A9270 NON-COVERED ITEM OR SERVICE: HCPCS | Performed by: PHYSICIAN ASSISTANT

## 2020-10-02 PROCEDURE — 97116 GAIT TRAINING THERAPY: CPT

## 2020-10-02 PROCEDURE — 63710000001 ISOSORBIDE MONONITRATE 30 MG TABLET SUSTAINED-RELEASE 24 HOUR: Performed by: PHYSICIAN ASSISTANT

## 2020-10-02 PROCEDURE — 63710000001 MELOXICAM 7.5 MG TABLET: Performed by: ORTHOPAEDIC SURGERY

## 2020-10-02 PROCEDURE — 63710000001 APIXABAN 2.5 MG TABLET: Performed by: NURSE PRACTITIONER

## 2020-10-02 PROCEDURE — 99024 POSTOP FOLLOW-UP VISIT: CPT | Performed by: ORTHOPAEDIC SURGERY

## 2020-10-02 PROCEDURE — 63710000001 PSYLLIUM 58.12 % PACK: Performed by: NURSE PRACTITIONER

## 2020-10-02 PROCEDURE — A9270 NON-COVERED ITEM OR SERVICE: HCPCS | Performed by: INTERNAL MEDICINE

## 2020-10-02 PROCEDURE — 85027 COMPLETE CBC AUTOMATED: CPT | Performed by: ORTHOPAEDIC SURGERY

## 2020-10-02 PROCEDURE — 63710000001 DOCUSATE SODIUM 100 MG CAPSULE: Performed by: NURSE PRACTITIONER

## 2020-10-02 PROCEDURE — 63710000001 BISACODYL 5 MG TABLET DELAYED-RELEASE: Performed by: NURSE PRACTITIONER

## 2020-10-02 PROCEDURE — 63710000001 ACETAMINOPHEN 325 MG TABLET: Performed by: ORTHOPAEDIC SURGERY

## 2020-10-02 RX ORDER — MELOXICAM 15 MG/1
15 TABLET ORAL DAILY
Start: 2020-10-02 | End: 2021-10-04

## 2020-10-02 RX ORDER — ASPIRIN 81 MG/1
81 TABLET, CHEWABLE ORAL DAILY
Start: 2020-10-02 | End: 2023-01-14

## 2020-10-02 RX ORDER — DOCUSATE SODIUM 100 MG/1
100 CAPSULE, LIQUID FILLED ORAL 2 TIMES DAILY
Qty: 60 CAPSULE | Refills: 0 | Status: SHIPPED | OUTPATIENT
Start: 2020-10-02 | End: 2021-10-04

## 2020-10-02 RX ORDER — EVOLOCUMAB 140 MG/ML
140 INJECTION, SOLUTION SUBCUTANEOUS
Qty: 2.1 ML
Start: 2020-10-02

## 2020-10-02 RX ORDER — ISOSORBIDE MONONITRATE 30 MG/1
30 TABLET, EXTENDED RELEASE ORAL
Qty: 30 TABLET | Refills: 0 | Status: SHIPPED | OUTPATIENT
Start: 2020-10-03 | End: 2021-10-04

## 2020-10-02 RX ADMIN — MELOXICAM 15 MG: 7.5 TABLET ORAL at 09:02

## 2020-10-02 RX ADMIN — POTASSIUM PHOSPHATE, MONOBASIC AND POTASSIUM PHOSPHATE, DIBASIC 15 MMOL: 224; 236 INJECTION, SOLUTION, CONCENTRATE INTRAVENOUS at 04:10

## 2020-10-02 RX ADMIN — BISACODYL 10 MG: 5 TABLET, COATED ORAL at 06:12

## 2020-10-02 RX ADMIN — DOCUSATE SODIUM 100 MG: 100 CAPSULE, LIQUID FILLED ORAL at 09:02

## 2020-10-02 RX ADMIN — PANTOPRAZOLE SODIUM 40 MG: 40 TABLET, DELAYED RELEASE ORAL at 09:02

## 2020-10-02 RX ADMIN — OXYCODONE 5 MG: 5 TABLET ORAL at 06:20

## 2020-10-02 RX ADMIN — CARVEDILOL 3.12 MG: 3.12 TABLET, FILM COATED ORAL at 09:02

## 2020-10-02 RX ADMIN — APIXABAN 2.5 MG: 2.5 TABLET, FILM COATED ORAL at 09:02

## 2020-10-02 RX ADMIN — FLUOXETINE HYDROCHLORIDE 20 MG: 20 CAPSULE ORAL at 09:02

## 2020-10-02 RX ADMIN — ASPIRIN 81 MG: 81 TABLET, CHEWABLE ORAL at 09:02

## 2020-10-02 RX ADMIN — PSYLLIUM HUSK 1 PACKET: 3.4 POWDER ORAL at 09:01

## 2020-10-02 RX ADMIN — ACETAMINOPHEN 650 MG: 325 TABLET, FILM COATED ORAL at 09:01

## 2020-10-02 RX ADMIN — OXYCODONE 5 MG: 5 TABLET ORAL at 00:47

## 2020-10-02 RX ADMIN — ISOSORBIDE MONONITRATE 30 MG: 30 TABLET, EXTENDED RELEASE ORAL at 09:02

## 2020-10-02 NOTE — THERAPY DISCHARGE NOTE
Patient Name: Alicia Young  : 1945    MRN: [id number]                              Today's Date: 10/2/2020       Admit Date: 2020    Visit Dx:     ICD-10-CM ICD-9-CM   1. Status post total left knee replacement  Z96.652 V43.65   2. Primary osteoarthritis of left knee  M17.12 715.16     Patient Active Problem List   Diagnosis   • Abnormal stress test   • Primary osteoarthritis of left knee   • Status post total left knee replacement   • CHF (congestive heart failure) (CMS/[facility])   • HTN (hypertension)   • Hyperlipidemia   • Leukocytosis, mild, likely reactive   • Acute postoperative pain   • Chest pain, atypical   • Hypophosphatemia, replaced   • Hypomagnesemia, replaced     Past Medical History:   Diagnosis Date   • Arthritis    • CHF (congestive heart failure) (CMS/[facility])    • Depression    • GERD (gastroesophageal reflux disease)    • History of heart attack    • Hyperlipidemia    • Hypertension    • Macular degeneration    • Wears glasses      Past Surgical History:   Procedure Laterality Date   • CARDIAC CATHETERIZATION     • CARDIAC CATHETERIZATION N/A 2019    Procedure: Left Heart Cath (13506);  Surgeon: Ronny Hardin MD;  Location:  [facility] CATH INVASIVE LOCATION;  Service: Cardiovascular   • CHOLECYSTECTOMY     • COLONOSCOPY     • CORONARY ARTERY BYPASS GRAFT     • TOTAL KNEE ARTHROPLASTY Left 2020    Procedure: TOTAL KNEE ARTHROPLASTY LEFT;  Surgeon: Heladio Arroyo MD;  Location: [facility] OR;  Service: Orthopedics;  Laterality: Left;     General Information     Row Name 10/02/20 6476          Physical Therapy Time and Intention    Document Type  discharge treatment  -KG     Mode of Treatment  physical therapy  -KG     Row Name 10/02/20 1348          General Information    Patient Profile Reviewed  yes  -KG     Existing Precautions/Restrictions  fall  -KG     Row Name 10/02/20 1343          Cognition    Orientation Status (Cognition)  oriented x 4  -KG     Row Name 10/02/20 5853           Safety Issues, Functional Mobility    Impairments Affecting Function (Mobility)  balance;pain;strength;range of motion (ROM)  -KG       User Key  (r) = Recorded By, (t) = Taken By, (c) = Cosigned By    Initials Name Provider Type    ANNA Flor Zelaya Physical Therapist        Mobility     Row Name 10/02/20 8876          Bed Mobility    Bed Mobility  supine-sit;scooting/bridging  -KG     Scooting/Bridging [place] (Bed Mobility)  independent  -KG     Supine-Sit [place] (Bed Mobility)  modified independence;verbal cues  -KG     Sit-Supine [place] (Bed Mobility)  independent  -KG     Assistive Device (Bed Mobility)  bed rails;head of bed elevated  -KG     Comment (Bed Mobility)  cues to advance LEs to EOB  -KG     Row Name 10/02/20 9624          Transfers    Comment (Transfers)  STS from EOB: cues to push up from chair, slide LLE out for comfort:  -KG     Row Name 10/02/20 1344          Sit-Stand Transfer    Sit-Stand [place] (Transfers)  contact guard;verbal cues  -KG     Assistive Device (Sit-Stand Transfers)  walker, front-wheeled  -KG     Row Name 10/02/20 6365          Gait/Stairs (Locomotion)    [place] Level (Gait)  verbal cues;contact guard  -KG     Assistive Device (Gait)  walker, front-wheeled  -KG     Distance in Feet (Gait)  200  -KG     Deviations/Abnormal Patterns (Gait)  left sided deviations;antalgic;weight shifting decreased;gait speed decreased  -KG     Bilateral Gait Deviations  forward flexed posture  -KG     Left Sided Gait Deviations  weight shift ability decreased;heel strike decreased  -KG     Comment (Gait/Stairs)  Pt able to ambulate 200', RW and CGA.  Cues for heel strike and posture.  Pt encouraged to increase WB on LLE with less dependence on walker.  -KG     Row Name 10/02/20 0715          Mobility    Extremity Weight-bearing Status  left lower extremity  -KG     Left Lower Extremity (Weight-bearing Status)  weight-bearing as tolerated (WBAT)  -KG        User Key  (r) = Recorded By, (t) = Taken By, (c) = Cosigned By    Initials Name Provider Type    ANNA Flor Zelaya Physical Therapist        Obj/Interventions     Row Name 10/02/20 1356          Knee (Therapeutic Exercise)    Knee (Therapeutic Exercise)  AROM (active range of motion)  -KG     Knee AROM (Therapeutic Exercise)  flexion;extension;10 repetitions  -KG     Knee Isometrics (Therapeutic Exercise)  left;quad sets;10 repetitions;3 second hold  -KG     Knee Strengthening (Therapeutic Exercise)  heel slides;SLR (straight leg raise);LAQ (long arc quad);10 repetitions  -KG     Row Name 10/02/20 1356          Ankle (Therapeutic Exercise)    Ankle (Therapeutic Exercise)  AROM (active range of motion)  -KG     Ankle AROM (Therapeutic Exercise)  bilateral;dorsiflexion;plantarflexion  -KG     Row Name 10/02/20 1356          Balance    Balance Assessment  standing dynamic balance  -KG     Dynamic Standing Balance  mild impairment  -KG       User Key  (r) = Recorded By, (t) = Taken By, (c) = Cosigned By    Initials Name Provider Type    ANNA Flor Zelaya Physical Therapist        Goals/Plan     Row Name 10/02/20 1400          Bed Mobility Goal 1 (PT)    Activity/Assistive Device (Bed Mobility Goal 1, PT)  scooting;sit to supine;supine to sit  -KG     [place] Level/Cues Needed (Bed Mobility Goal 1, PT)  modified independence  -KG     Time Frame (Bed Mobility Goal 1, PT)  long term goal (LTG);10 days  -KG     Progress/Outcomes (Bed Mobility Goal 1, PT)  goal met  -KG     Row Name 10/02/20 1400          Transfer Goal 1 (PT)    Activity/Assistive Device (Transfer Goal 1, PT)  sit-to-stand/stand-to-sit;bed-to-chair/chair-to-bed;walker, rolling  -KG     [place] Level/Cues Needed (Transfer Goal 1, PT)  modified independence  -KG     Time Frame (Transfer Goal 1, PT)  long term goal (LTG);10 days  -KG     Progress/Outcome (Transfer Goal 1, PT)  goal partially met  -KG     Row Name 10/02/20 1400          Gait  Training Goal 1 (PT)    Activity/Assistive Device (Gait Training Goal 1, PT)  gait (walking locomotion);walker, rolling  -KG     [place] Level (Gait Training Goal 1, PT)  standby assist  -KG     Time Frame (Gait Training Goal 1, PT)  long term goal (LTG);10 days  -KG     Progress/Outcome (Gait Training Goal 1, PT)  goal partially met;good progress toward goal  -KG     Row Name 10/02/20 1400          ROM Goal 1 (PT)    ROM Goal 1 (PT)  L knee 0-90deg  -KG     Time Frame (ROM Goal 1, PT)  long-term goal (LTG);2 weeks  -KG     Progress/Outcome (ROM Goal 1, PT)  goal partially met;continuing progress toward goal  -KG       User Key  (r) = Recorded By, (t) = Taken By, (c) = Cosigned By    Initials Name Provider Type    Flor London Physical Therapist        Clinical Impression     Row Name 10/02/20 4497          Pain    Additional Documentation  Pain Scale: Numbers Pre/Post-Treatment (Group)  -KG     Row Name 10/02/20 7529          Pain Scale: Numbers Pre/Post-Treatment    Pretreatment Pain Rating  2/10  -KG     Posttreatment Pain Rating  2/10  -KG     Pain Location - Side  Left  -KG     Pain Location  knee  -KG     Pain Intervention(s)  Repositioned;Cold applied  -KG     Row Name 10/02/20 8940          Plan of Care Review    Plan of Care Reviewed With  patient;daughter  -KG     Progress  improving  -KG     Outcome Summary  Pt able to ambulate 200', RW and CGA.  Cues for heel strike and posture.  Pt encouraged to increase WB on LLE with less dependence on walker.  Pt ready for d/c home with assist and HH  -KG     Row Name 10/02/20 3442          Positioning and Restraints    Pre-Treatment Position  in bed  -KG     Post Treatment Position  chair  -KG     In Chair  notified nsg;call light within reach;encouraged to call for assist;exit alarm on  -KG       User Key  (r) = Recorded By, (t) = Taken By, (c) = Cosigned By    Initials Name Provider Type    Flor London Physical Therapist        Outcome  Measures     Row Name 10/02/20 1401          How much help from another person do you currently need...    Turning from your back to your side while in flat bed without using bedrails?  4  -KG     Moving from lying on back to sitting on the side of a flat bed without bedrails?  4  -KG     Moving to and from a bed to a chair (including a wheelchair)?  3  -KG     Standing up from a chair using your arms (e.g., wheelchair, bedside chair)?  3  -KG     Climbing 3-5 steps with a railing?  3  -KG     To walk in hospital room?  3  -KG     AM-PAC 6 Clicks Score (PT)  20  -KG     Row Name 10/02/20 1401          Functional Assessment    Outcome Measure Options  AM-PAC 6 Clicks Basic Mobility (PT)  -KG       User Key  (r) = Recorded By, (t) = Taken By, (c) = Cosigned By    Initials Name Provider Type    ANNA Flor Zelaya Physical Therapist        Physical Therapy Education                 Title: PT OT SLP Therapies (In Progress)     Topic: Physical Therapy (Done)     Point: Mobility training (Done)     Learning Progress Summary           Patient Acceptance, E,TB, VU by KG at 10/2/2020 1401    Acceptance, E,TB, VU by KG at 10/1/2020 1438    Acceptance, TB,E, VU by KG at 10/1/2020 1155    Eager, E, VU by SC at 9/30/2020 1601    Comment: reviewwed safety with mobility    Eager, E, VU,DU,NR by SC at 9/30/2020 1314    Comment: reviewed HEP    Eager, E, VU,NR by SC at 9/29/2020 1708    Comment: reviewed benefits of activity                   Point: Home exercise program (Done)     Learning Progress Summary           Patient Acceptance, E,TB, VU by KG at 10/2/2020 1401    Acceptance, E,TB, VU by KG at 10/1/2020 1438    Acceptance, TB,E, VU by KG at 10/1/2020 1155    Eager, E, VU by SC at 9/30/2020 1601    Comment: reviewwed safety with mobility    Eager, E, VU,DU,NR by SC at 9/30/2020 1314    Comment: reviewed HEP    Eager, E, VU,NR by SC at 9/29/2020 1708    Comment: reviewed benefits of activity                   Point: Body  mechanics (Done)     Learning Progress Summary           Patient Acceptance, E,TB, VU by KG at 10/2/2020 1401    Acceptance, E,TB, VU by KG at 10/1/2020 1438    Acceptance, TB,E, VU by KG at 10/1/2020 1155    Eager, E, VU by SC at 9/30/2020 1601    Comment: reviewwed safety with mobility    Eager, E, VU,DU,NR by SC at 9/30/2020 1314    Comment: reviewed HEP    Eager, E, VU,NR by SC at 9/29/2020 1708    Comment: reviewed benefits of activity                   Point: Precautions (Done)     Learning Progress Summary           Patient Acceptance, E,TB, VU by KG at 10/2/2020 1401    Acceptance, E,TB, VU by KG at 10/1/2020 1438    Acceptance, TB,E, VU by KG at 10/1/2020 1155    Eager, E, VU by SC at 9/30/2020 1601    Comment: reviewwed safety with mobility    Eager, E, VU,DU,NR by SC at 9/30/2020 1314    Comment: reviewed HEP    Eager, E, VU,NR by SC at 9/29/2020 1708    Comment: reviewed benefits of activity                               User Key     Initials Effective Dates Name Provider Type Discipline    SC 06/19/15 -  Romulo Rivera, PT Physical Therapist PT     08/28/19 -  Flor Zelaya Physical Therapist PT              PT Recommendation and Plan     Plan of Care Reviewed With: patient, daughter  Progress: improving  Outcome Summary: Pt able to ambulate 200', RW and CGA.  Cues for heel strike and posture.  Pt encouraged to increase WB on LLE with less dependence on walker.  Pt ready for d/c home with assist and HH     Time Calculation:   PT Charges     Row Name 10/02/20 1402             Time Calculation    Start Time  1120  -KG      PT Received On  10/02/20  -KG      PT Goal Re-Cert Due Date  10/09/20  -KG         Time Calculation- PT    Total Timed Code Minutes- PT  24 minute(s)  -KG         Timed Charges    66026 - PT Therapeutic Exercise Minutes  9  -KG      56979 - Gait Training Minutes   15  -KG        User Key  (r) = Recorded By, (t) = Taken By, (c) = Cosigned By    Initials Name Provider Type    KG  Flor Zelaya Physical Therapist        Therapy Charges for Today     Code Description Service Date Service Provider Modifiers Qty    32219678910 HC PT THER PROC EA 15 MIN 10/1/2020 Flor Zelaya GP 1    67674849248 HC GAIT TRAINING EA 15 MIN 10/1/2020 Flor Zelaya GP 2    83530987923 HC GAIT TRAINING EA 15 MIN 10/1/2020 Flor Zelaya GP 2    30100516300 HC PT THER PROC EA 15 MIN 10/2/2020 Flor Zelaya GP 1    78119151964 HC GAIT TRAINING EA 15 MIN 10/2/2020 Flor Zelaya GP 1          PT G-Codes  Outcome Measure Options: AM-PAC 6 Clicks Basic Mobility (PT)  AM-PAC 6 Clicks Score (PT): 20  AM-PAC 6 Clicks Score (OT): 19         Flor Zelaya  10/2/2020

## 2020-10-02 NOTE — PLAN OF CARE
Problem: Adult Inpatient Plan of Care  Goal: Plan of Care Review  Outcome: Met  Flowsheets (Taken 10/2/2020 1116)  Plan of Care Reviewed With:   patient   daughter  Goal: Patient-Specific Goal (Individualized)  Outcome: Met  Goal: Absence of Hospital-Acquired Illness or Injury  Outcome: Met  Goal: Optimal Comfort and Wellbeing  Outcome: Met  Goal: Readiness for Transition of Care  Outcome: Met     Problem: Fall Injury Risk  Goal: Absence of Fall and Fall-Related Injury  Outcome: Met     Problem: Bleeding (Knee Arthroplasty)  Goal: Absence of Bleeding  Outcome: Met     Problem: Bowel Elimination Impaired (Knee Arthroplasty)  Goal: Effective Bowel Elimination  Outcome: Met     Problem: Infection (Knee Arthroplasty)  Goal: Absence of Infection Signs and Symptoms  Outcome: Met     Problem: Joint Function Impaired (Knee Arthroplasty)  Goal: Optimal Functional Ability  Outcome: Met     Problem: Ongoing Anesthesia Effects (Knee Arthroplasty)  Goal: Anesthesia/Sedation Recovery  Outcome: Met     Problem: Postoperative Nausea and Vomiting (Knee Arthroplasty)  Goal: Nausea and Vomiting Relief  Outcome: Met     Problem: Pain (Knee Arthroplasty)  Goal: Acceptable Pain Control  Outcome: Met     Problem: Postoperative Urinary Retention (Knee Arthroplasty)  Goal: Effective Urinary Elimination  Outcome: Met   Goal Outcome Evaluation:  Plan of Care Reviewed With: patient, daughter  Progress: improving

## 2020-10-02 NOTE — PLAN OF CARE
Problem: Adult Inpatient Plan of Care  Goal: Plan of Care Review  Recent Flowsheet Documentation  Taken 10/2/2020 8167 by Flor Zelaya  Progress: improving  Plan of Care Reviewed With:   patient   daughter  Outcome Summary: Pt able to ambulate 200', RW and CGA.  Cues for heel strike and posture.  Pt encouraged to increase WB on LLE with less dependence on walker.  Pt ready for d/c home with assist and HH.  Knee ROM: 7-85

## 2020-10-02 NOTE — PROGRESS NOTES
"      [facility] Orthopaedic Surgery Progress Note    Subjective      LOS: 0 days   Patient Care Team:  Shamir Morales MD as PCP - General (Internal Medicine)    CC: Left knee pain    Interval History:   Patient resting comfortably in a chair.  Pain is controlled.  She was not approved for the [facility].    Objective      Vital Signs  Temp (24hrs), Av.4 °F (36.9 °C), Min:97.8 °F (36.6 °C), Max:99.5 °F (37.5 °C)      /80 (BP Location: Left arm, Patient Position: Lying)   Pulse 78   Temp 98 °F (36.7 °C) (Oral)   Resp 16   Ht 160 cm (63\")   Wt 71.7 kg (158 lb)   SpO2 95%   BMI 27.99 kg/m²     Examination:   Examination of the left knee: The wound is clean, dry, and intact.  Ankle dorsiflexion, ankle plantar flexion, and EHL are intact.  Sensation intact in the foot to light touch.  2+ dorsalis pedis pulse.  Straight leg raise is intact.      Labs:  Results from last 7 days   Lab Units 10/02/20  0506 10/01/20  0621 20  0654   WBC 10*3/mm3 10.51 10.49 11.97*   HEMOGLOBIN g/dL 9.8* 9.8* 10.5*   HEMATOCRIT % 30.6* 30.6* 31.6*   MCV fL 98.7* 100.0* 98.1*   PLATELETS 10*3/mm3 122* 122* 143       Radiology:  Imaging Results (Last 24 Hours)     ** No results found for the last 24 hours. **          PT:  Physical Therapy - Plan of Care Review - Outcome Summary:  Outcome Summary: Pt able to ambulate 150', RW with CGA.  Cues for heel strike and increased WB LLE.  Pt encouraged to use hospital walker instead of her own walker due to bar on her personal walker that blocked knee movement.  Demonstrated safety on stairs, CGA with step to technique  Pt not able to perform knee exercises or ROM due to other staff member needing to perform testing, instructed to perform exercises later today. (10/01/20 1436)]       Results Review:     I reviewed the patient's new clinical results.    Assessment and Plan     Assessment:   Status post left total knee arthroplasty-doing well      Status post total left knee replacement   "  Primary osteoarthritis of left knee    CHF (congestive heart failure) (CMS/HCC)    HTN (hypertension)    Hyperlipidemia    Leukocytosis, mild, likely reactive    Acute postoperative pain    Chest pain, atypical    Hypophosphatemia, replaced    Hypomagnesemia, replaced      Plan for disposition: Plan for discharge to home today with continued rehab at home as she has not been cleared for the [facility].  Follow-up with me in 3 weeks as planned.      Future Appointments   Date Time Provider Department Center   10/21/2020 10:20 AM Heladio Arroyo MD [facility]           Heladio Arroyo MD  10/02/20  12:17 EDT

## 2020-10-02 NOTE — DISCHARGE SUMMARY
Patient Name: Alicia Young  MRN: [id number]  : 1945  DOS: 10/2/2020    Attending: Heladio Arroyo MD    Primary Care Provider: Shamir Morales MD    Date of Admission:.2020  7:26 AM    Date of Discharge:  10/2/2020    Discharge Diagnosis:   Status post total left knee replacement    Primary osteoarthritis of left knee    CHF (congestive heart failure) (CMS/[facility])    HTN (hypertension)    Hyperlipidemia    Leukocytosis, mild, likely reactive    Acute postoperative pain    Chest pain, atypical    Hypophosphatemia, replaced    Hypomagnesemia, replaced      Hospital Course    At admit:    Patient is a pleasant 74 y.o. female presented for scheduled surgery by Dr. Arroyo.  She underwent left total knee arthroplasty under spinal anesthesia.  She tolerated surgery well and was admitted for further medical management.  Her knee has been painful for many years.  She uses a cane occasionally for ambulation.  She denies recent falls.     She has a history of hypertension, hyperlipidemia and congestive heart failure.  She is followed by Dr. Hardin, cardiology, and had preop cardiac clearance.     After admit:    Patient was provided pain medications as needed for pain control, along with adductor canal nerve block infusion of Ropivacaine-out prior to discharge.    Adjustments were made to pain medications to optimize postop pain management.   Risks and benefits of opiate medications discussed with patient.  Pancho report in chart was reviewed prior to discharge.    She did have a episode of chest heaviness and pressure. She had EKG, troponin and a dose of nitroglycerin. Cardiology was consulted and she was started on Imdur. She had ECHO. She will follow-up with cardiology in 1 month.    She was seen by PT and OT and has progressed well over her stay.    She used an IS for atelectasis prophylaxis and Eliquis along with mechanicals for DVT prophylaxis.  Home medications were resumed as appropriate, and labs were  "monitored and remained fairly stable.     With the progress she has made, she is ready for DC home today.      Discussed with patient regarding plan and she shows understanding and agreement.    Patient will have HHPT following discharge.    Consultants:  Dr. Zee, cardiology    Procedures Performed    DATE OF PROCEDURE: 20     PREOPERATIVE DIAGNOSIS: left knee arthritis       POSTOPERATIVE DIAGNOSIS:  left knee arthritis     PROCEDURES PERFORMED:   left total knee arthroplasty with [employer] components (# 4 cruciate retaining femur, # 3  tibia, 11 mm polyethylene, with 32 mm three peg patella).      SURGEON: Heladio Arroyo MD    Pertinent Test Results:    I reviewed the patient's new clinical results.   Results from last 7 days   Lab Units 10/02/20  0506 10/01/20  0621 20  0654   WBC 10*3/mm3 10.51 10.49 11.97*   HEMOGLOBIN g/dL 9.8* 9.8* 10.5*   HEMATOCRIT % 30.6* 30.6* 31.6*   PLATELETS 10*3/mm3 122* 122* 143     Results from last 7 days   Lab Units 10/02/20  0138 10/01/20  0829 20  0654 20  0823   SODIUM mmol/L  --  134* 138 140   POTASSIUM mmol/L 4.0 3.8 4.0 3.5   CHLORIDE mmol/L  --  101 106 104   CO2 mmol/L  --  25.0 25.0 27.0   BUN mg/dL  --  11 8 6*   CREATININE mg/dL  --  0.60 0.70 0.72   CALCIUM mg/dL  --  8.6 8.6 9.2   BILIRUBIN mg/dL  --  0.3  --   --    ALK PHOS U/L  --  62  --   --    ALT (SGPT) U/L  --  7  --   --    AST (SGOT) U/L  --  18  --   --    GLUCOSE mg/dL  --  128* 132* 80     I reviewed the patient's new imaging including images and reports.      Physical therapy: Pt able to ambulate 350', RW and CGA.  Cues for heel strike, posture and increasing WB LLE.  L knee ROM: 7-90 degrees      Discharge Assessment:    Vital Signs  Visit Vitals  /80 (BP Location: Left arm, Patient Position: Lying)   Pulse 78   Temp 98 °F (36.7 °C) (Oral)   Resp 16   Ht 160 cm (63\")   Wt 71.7 kg (158 lb)   SpO2 95%   BMI 27.99 kg/m²     Temp (24hrs), Av.5 °F (36.9 " °C), Min:97.8 °F (36.6 °C), Max:99.5 °F (37.5 °C)      General Appearance:    Alert, cooperative, in no acute distress   Lungs:     Clear to auscultation,respirations regular, even and unlabored    Heart:    Regular rhythm and normal rate, normal S1 and S2   Abdomen:     Normal bowel sounds, no masses, no organomegaly, soft non-tender, non-distended, no guarding, no rebound tenderness   Extremities:   CDI dressing surgical knee, left . ACB cath out. Moves all extremities well, no edema, no cyanosis, no redness   Pulses:   Pulses palpable and equal bilaterally   Skin:   No bleeding, bruising or rash   Neurologic:   Cranial nerves 2 - 12 grossly intact, sensation intact, Flexion and dorsiflexion intact bilateral feet.       Discharge Disposition: Home    Discharge Medications:     Discharge Medications      New Medications      Instructions Start Date   apixaban 2.5 MG tablet tablet  Commonly known as: ELIQUIS   2.5 mg, Oral, Every 12 Hours Scheduled, For 1 month      docusate sodium 100 MG capsule  Commonly known as: Colace   100 mg, Oral, 2 Times Daily      isosorbide mononitrate 30 MG 24 hr tablet  Commonly known as: IMDUR   30 mg, Oral, Every 24 Hours Scheduled   Start Date: October 3, 2020     oxyCODONE 5 MG immediate release tablet  Commonly known as: ROXICODONE   5 mg, Oral, Every 4 Hours PRN         Changes to Medications      Instructions Start Date   aspirin 81 MG chewable tablet  What changed: additional instructions   81 mg, Oral, Daily, Resume in 1 month      meloxicam 15 MG tablet  Commonly known as: MOBIC  What changed:   · how much to take  · additional instructions   15 mg, Oral, Daily, Must take an hour after aspirin if needed.      Repatha 140 MG/ML solution prefilled syringe  Generic drug: Evolocumab  What changed:   · medication strength  · how much to take  · when to take this  · additional instructions  · Another medication with the same name was removed. Continue taking this medication, and  follow the directions you see here.   140 mg, Subcutaneous, Every 30 Days, Resume after follow-up with Dr. Arroyo         Continue These Medications      Instructions Start Date   carvedilol 3.125 MG tablet  Commonly known as: COREG   3.125 mg, Oral, 2 Times Daily      FLUoxetine 20 MG capsule  Commonly known as: PROzac   20 mg, Oral, Daily      NITROGLYCERIN PATCH   No dose, route, or frequency recorded.      pantoprazole 40 MG EC tablet  Commonly known as: PROTONIX   40 mg, Oral, 2 times daily      PRESERVISION AREDS PO   1 tablet, Oral, Daily      spironolactone 25 MG tablet  Commonly known as: ALDACTONE   25 mg, Oral, Daily             Discharge Diet: Cardiac diet      Activity at Discharge: WBAT LLE      Follow-up Appointments:  Dr. Arroyo per his orders  Cardiology 1 month      LOLA Herrera  10/02/20  10:55 EDT

## 2020-10-02 NOTE — PLAN OF CARE
Pt alert and oriented. Pain controlled with Po pain medication. Nerve blocked was empty at end of shift, pulled. Phosphorus replacement administered. Phosphorus recheck at 1400. Pt rested well this night. Will continue to monitor.

## 2020-10-08 ENCOUNTER — TELEPHONE (OUTPATIENT)
Dept: ORTHOPEDIC SURGERY | Facility: CLINIC | Age: 75
End: 2020-10-08

## 2020-10-08 NOTE — TELEPHONE ENCOUNTER
I spoke with the patient's daughter and she mentioned that her mother has been doing really well with the knee and everything with zero pain and no fevers, chills or night sweats. She is however having issues with sleeping and she feels like her body is restless all over. The twitching and spasms are random at times with different body parts and she just cannot get comfortable. The patient has had sleep problems before the surgery but now it is just bothersome and she is only getting about 1-1.5 hours of sleep.    Please advise.    Sujey

## 2020-10-08 NOTE — TELEPHONE ENCOUNTER
I spoke with the patient's daughter and I had advised her that per Dr. Arroyo, they need to contact her primary care to discuss. She understood.    Sujey

## 2020-10-08 NOTE — TELEPHONE ENCOUNTER
PATIENT'S DAUGHTER WAS CALLING IN REGARDS TO HER MOTHER. SHE HAS A TOTAL KNEE DONE ON 9/29/2020 AND EVER SINCE THEN SHE IS HAVING A FEELING OF BEING RESTLESS AT NIGHT. SHE IS TWITCHING AND HAVING TINGLING ALL OVER THE BODY. SHE WOULD LIKE SOMEONE TO CALL HER BACK -[phone].

## 2020-10-12 ENCOUNTER — NURSE TRIAGE (OUTPATIENT)
Dept: CALL CENTER | Facility: HOSPITAL | Age: 75
End: 2020-10-12

## 2020-10-12 ENCOUNTER — TELEPHONE (OUTPATIENT)
Dept: ORTHOPEDIC SURGERY | Facility: CLINIC | Age: 75
End: 2020-10-12

## 2020-10-12 NOTE — TELEPHONE ENCOUNTER
I called patient, she said she read on the papers that the prineo dressing would come off 7-10 days and was confused as to why it hadn't come off yet. I explained that she needs to take it off herself. I explained that it should come off very easily. She understood and will remove this if she can.  Meagan

## 2020-10-12 NOTE — TELEPHONE ENCOUNTER
She got dressing wet, wanting to know if something needs to be done, is clear dressing, told her if not water under dressing should be ok,she says cannot tell, told her to call surgeon office speak to Dr. Arroyo Nurse  Reason for Disposition  • [1] Caller has URGENT question AND [2] triager unable to answer question    Additional Information  • Negative: [1] Major abdominal surgical incision AND [2] wound gaping open AND [3] visible internal organs  • Negative: Sounds like a life-threatening emergency to the triager  • Negative: [1] Bleeding from incision AND [2] won't stop after 10 minutes of direct pressure  • Negative: [1] Widespread rash AND [2] bright red, sunburn-like  • Negative: Severe pain in the incision  • Negative: [1] Incision gaping open AND [2] < 48 hours since wound re-opened  • Negative: [1] Incision gaping open AND [2] length of opening > 2 inches (5 cm)  • Negative: Patient sounds very sick or weak to the triager  • Negative: Sounds like a serious complication to the triager  • Negative: Fever > 100.4 F (38.0 C)  • Negative: [1] Incision looks infected (spreading redness, pain) AND [2] fever > 99.5 F (37.5 C)  • Negative: [1] Incision looks infected (spreading redness, pain) AND [2] large red area (> 2 in. or 5 cm)  • Negative: [1] Incision looks infected (spreading redness, pain) AND [2] face wound  • Negative: [1] Red streak runs from the incision AND [2] longer than 1 inch (2.5 cm)  • Negative: [1] Pus or bad-smelling fluid draining from incision AND [2] no fever  • Negative: [1] Post-op pain AND [2] not controlled with pain medications  • Negative: Dressing soaked with blood or body fluid (e.g., drainage)  • Negative: [1] Raised bruise and [2] size > 2 inches (5 cm) and expanding  • Negative: [1] INCREASING pain in incision AND [2] > 2 days (48 hours) since surgery  • Negative: [1] Small red area or streak AND [2] no fever    Answer Assessment - Initial Assessment Questions  1. SYMPTOM:  "\"What's the main symptom you're concerned about?\" (e.g., redness, pain, drainage)      No redness just dressing question  2. ONSET: \"When did dressign  start?\"      When will it come off  3. SURGERY: \"What surgery was performed?\"     0929  4. DATE of SURGERY: \"When was surgery performed?\"       knee  5. INCISION SITE: \"Where is the incision located?\"       knee  6. REDNESS: \"Is there any redness at the incision site?\" If yes, ask: \"How wide across is the redness?\" (Inches, centimeters)       no  7. PAIN: \"Is there any pain?\" If so, ask: \"How bad is it?\"  (Scale 1-10; or mild, moderate, severe)      no  8. BLEEDING: \"Is there any bleeding?\" If so, ask: \"How much?\" and \"Where?\"      no  9. DRAINAGE: \"Is there any drainage from the incision site?\" If yes, ask: \"What color and how much?\" (e.g., red, cloudy, pus; drops, teaspoon)      no  10. FEVER: \"Do you have a fever?\" If so, ask: \"What is your temperature, how was it measured, and when did it start?\"        no  11. OTHER SYMPTOMS: \"Do you have any other symptoms?\" (e.g., shaking chills, weakness, rash elsewhere on body)        dressing question, got it wet    Protocols used: POST-OP INCISION SYMPTOMS AND QUESTIONS-ADULT-      " 36.8

## 2020-10-21 ENCOUNTER — OFFICE VISIT (OUTPATIENT)
Dept: ORTHOPEDIC SURGERY | Facility: CLINIC | Age: 75
End: 2020-10-21

## 2020-10-21 DIAGNOSIS — Z96.652 S/P TKR (TOTAL KNEE REPLACEMENT), LEFT: Primary | ICD-10-CM

## 2020-10-21 DIAGNOSIS — Z47.89 ORTHOPEDIC AFTERCARE: ICD-10-CM

## 2020-10-21 PROCEDURE — 99024 POSTOP FOLLOW-UP VISIT: CPT | Performed by: ORTHOPAEDIC SURGERY

## 2020-10-21 NOTE — PROGRESS NOTES
[facility] [place] Orthopaedic Surgery Clinic Note    Subjective     Chief Complaint   Patient presents with   • Post-op     3 weeks s/p (L) TKA 09/29/2020        HPI    It has been 3  week(s) since Ms. Young's last visit. She returns to clinic today for postoperative follow-up of left knee arthroplasty. She rates her pain a 2/10 on the pain scale. Previous/current treatments: bracing, cane/walker, NSAIDS and physical therapy. Current symptoms: pain and stiffness. The pain is worse with sleeping; resting, ice and pain medication and/or NSAID improve the pain. Overall, she is doing better.  80 percent improvement compared to her preoperative symptoms.  Ambulating with the aid of a cane.    I have reviewed the following portions of the patient's history and agree with: History of Present Illness and Review of Systems    Patient Active Problem List   Diagnosis   • Abnormal stress test   • Primary osteoarthritis of left knee   • Status post total left knee replacement   • CHF (congestive heart failure) (CMS/[facility]ly [facility])   • HTN (hypertension)   • Hyperlipidemia   • Leukocytosis, mild, likely reactive   • Acute postoperative pain   • Chest pain, atypical   • Hypophosphatemia, replaced   • Hypomagnesemia, replaced     Past Medical History:   Diagnosis Date   • Arthritis    • CHF (congestive heart failure) (CMS/Formerly [facility])    • Depression    • GERD (gastroesophageal reflux disease)    • History of heart attack    • Hyperlipidemia    • Hypertension    • Macular degeneration    • Wears glasses       Past Surgical History:   Procedure Laterality Date   • CARDIAC CATHETERIZATION     • CARDIAC CATHETERIZATION N/A 9/16/2019    Procedure: Left Heart Cath (76347);  Surgeon: Ronny Hardin MD;  Location: [facility] INVASIVE LOCATION;  Service: Cardiovascular   • CHOLECYSTECTOMY     • COLONOSCOPY     • CORONARY ARTERY BYPASS GRAFT     • TOTAL KNEE ARTHROPLASTY Left 9/29/2020    Procedure: TOTAL KNEE ARTHROPLASTY LEFT;  Surgeon: Heladio Arroyo MD;   Location: [facility];  Service: Orthopedics;  Laterality: Left;      History reviewed. No pertinent family history.  Social History     Socioeconomic History   • Marital status:      Spouse name: Not on file   • Number of children: Not on file   • Years of education: Not on file   • Highest education level: Not on file   Tobacco Use   • Smoking status: Former Smoker     Quit date: 1988     Years since quittin.8   • Smokeless tobacco: Never Used   Substance and Sexual Activity   • Alcohol use: No   • Drug use: No   • Sexual activity: Defer      Current Outpatient Medications on File Prior to Visit   Medication Sig Dispense Refill   • apixaban (ELIQUIS) 2.5 MG tablet tablet Take 1 tablet by mouth Every 12 (Twelve) Hours. For 1 month  Indications: Post Surgical - Knee 60 tablet 0   • aspirin 81 MG chewable tablet Chew 1 tablet Daily. Resume in 1 month     • carvedilol (COREG) 3.125 MG tablet Take 1 tablet by mouth 2 (Two) Times a Day. 180 tablet 3   • docusate sodium (Colace) 100 MG capsule Take 1 capsule by mouth 2 (Two) Times a Day. 60 capsule 0   • Evolocumab (Repatha) 140 MG/ML solution prefilled syringe Inject 1 mL under the skin into the appropriate area as directed Every 30 (Thirty) Days. Resume after follow-up with Dr. Arroyo 2.1 mL    • FLUoxetine (PROzac) 20 MG capsule Take 20 mg by mouth Daily.     • isosorbide mononitrate (IMDUR) 30 MG 24 hr tablet Take 1 tablet by mouth Daily. 30 tablet 0   • meloxicam (MOBIC) 15 MG tablet Take 1 tablet by mouth Daily. Must take an hour after aspirin if needed.     • Multiple Vitamins-Minerals (PRESERVISION AREDS PO) Take 1 tablet by mouth Daily.     • NITROGLYCERIN PATCH      • pantoprazole (PROTONIX) 40 MG EC tablet Take 40 mg by mouth 2 (two) times a day.     • spironolactone (ALDACTONE) 25 MG tablet Take 1 tablet by mouth Daily. 30 tablet 11     No current facility-administered medications on file prior to visit.       Allergies   Allergen Reactions   •  Lipitor [Atorvastatin] Myalgia   • Ezetimibe Myalgia   • Statins Myalgia        Review of Systems   Constitutional: Negative for activity change, appetite change, chills, diaphoresis, fatigue, fever and unexpected weight change.   HENT: Negative for congestion, dental problem, drooling, ear discharge, ear pain, facial swelling, hearing loss, mouth sores, nosebleeds, postnasal drip, rhinorrhea, sinus pressure, sneezing, sore throat, tinnitus, trouble swallowing and voice change.    Eyes: Negative for photophobia, pain, discharge, redness, itching and visual disturbance.   Respiratory: Negative for apnea, cough, choking, chest tightness, shortness of breath, wheezing and stridor.    Cardiovascular: Negative for chest pain, palpitations and leg swelling.   Gastrointestinal: Negative for abdominal distention, abdominal pain, anal bleeding, blood in stool, constipation, diarrhea, nausea, rectal pain and vomiting.   Endocrine: Negative for cold intolerance, heat intolerance, polydipsia, polyphagia and polyuria.   Genitourinary: Negative for decreased urine volume, difficulty urinating, dysuria, enuresis, flank pain, frequency, genital sores, hematuria and urgency.   Musculoskeletal: Positive for arthralgias. Negative for back pain, gait problem, joint swelling, myalgias, neck pain and neck stiffness.   Skin: Negative for color change, pallor, rash and wound.   Allergic/Immunologic: Negative for environmental allergies, food allergies and immunocompromised state.   Neurological: Negative for dizziness, tremors, seizures, syncope, facial asymmetry, speech difficulty, weakness, light-headedness, numbness and headaches.   Hematological: Negative for adenopathy. Does not bruise/bleed easily.   Psychiatric/Behavioral: Negative for agitation, behavioral problems, confusion, decreased concentration, dysphoric mood, hallucinations, self-injury, sleep disturbance and suicidal ideas. The patient is not nervous/anxious and is not  hyperactive.         Objective      Physical Exam  There were no vitals taken for this visit.    There is no height or weight on file to calculate BMI.    General:   Mental Status:  Alert   Appearance: Cooperative, in no acute distress   Build and Nutrition: Well-nourished well-developed female   Orientation: Alert and oriented to person, place and time   Posture: Normal   Gait: Limp on the left    Integument:   Left knee: Wound is well-healed with no signs of infection    Lower Extremities:   Left Knee:    Tenderness:  None    Effusion:  Trace    Swelling: None    Crepitus:  None    Range of motion:  Extension: 0°       Flexion: 120°  Instability:  No varus laxity, no valgus laxity, negative anterior drawer  Deformities:  None      Imaging/Studies  Imaging Results (Last 24 Hours)     Procedure Component Value Units Date/Time    XR Knee 3+ View With Sunrise Left [[id number]] Resulted: 10/21/20 1036     Updated: 10/21/20 1037    Narrative:      Left Knee Radiographs  Indication: status-post left total knee arthroplasty  Views: AP, lateral, and sunrise views of the left knee    Comparison: no change compared to prior study, 9/29/2020    Findings:   The components are well aligned, with no signs of loosening or failure.            Assessment and Plan     Diagnoses and all orders for this visit:    1. S/P TKR (total knee replacement), left (Primary)  -     XR Knee 3+ View With [place] Left    2. Orthopedic aftercare        1. S/P TKR (total knee replacement), left    2. Orthopedic aftercare        I reviewed my findings with patient today.  Her left total knee arthroplasty is functioning well, she is pleased with results of far.  She will continue with rehabilitation.  I will see her back in 6 weeks, but sooner for any problems.    Return in about 6 weeks (around 12/2/2020).    Medical Decision Making  Management Options : physical/occupational therapy  Data/Risk: radiology tests and independent visualization of imaging,  lab tests, or EMG/NCV      Heladio Arroyo MD  10/21/20  10:50 EDT    Dragon disclaimer:  Much of this encounter note is an electronic transcription/translation of spoken language to printed text. The electronic translation of spoken language may permit erroneous, or at times, nonsensical words or phrases to be inadvertently transcribed; Although I have reviewed the note for such errors, some may still exist.

## 2020-10-23 ENCOUNTER — TELEPHONE (OUTPATIENT)
Dept: ORTHOPEDIC SURGERY | Facility: CLINIC | Age: 75
End: 2020-10-23

## 2020-10-23 DIAGNOSIS — Z96.652 S/P TKR (TOTAL KNEE REPLACEMENT), LEFT: Primary | ICD-10-CM

## 2020-10-23 NOTE — TELEPHONE ENCOUNTER
PATIENT DAUGHTER CALLED REQUESTING AN ORDER TO FOR PHYSICAL THERAPY SENT TO [facility] IN [facility]. NASIM CAN BE REACHED [phone]

## 2020-10-23 NOTE — TELEPHONE ENCOUNTER
PATIENT WAS CALLING TO SEE IF SHE CAN DISCONTINUE HER BLOOD THINNERS. SHE HAS HAD A FEW BAD NOSE BLEEDS AND SHE IS WONDERING IF THE MEDICATION THAT SHE IS ON HAS SOMETHING TO DO WITH IT. SHE CAN BE REACHED [phone]

## 2020-10-23 NOTE — TELEPHONE ENCOUNTER
Spoke with pt letting her know MEK's message. I also let her know that [facility]K placed order for PT and I will fax that to [facility] at [place].    Nette

## 2020-11-06 NOTE — PROGRESS NOTES
"Patient returned call. She stated needs a refill for chlorthalidone (HYGROTON) 25 MG tablet as she is out. Pt does not want to have another lab test done if her insurance does not cover it. States she will check with her insurance first before scheduling a lab appointment. She also states she believes the reason for slightly elevated kidney function is because she takes \"massive amounts of vitamin C.\"     Routing to PCP to review message above. Sending refill request for other medication on separate encounter.    Jemma Welch MA 2:45 PM 11/6/2020    " Pt. Attended Phase III [facility]iac[facility]. See flow sheet for details.

## 2020-12-02 ENCOUNTER — OFFICE VISIT (OUTPATIENT)
Dept: ORTHOPEDIC SURGERY | Facility: CLINIC | Age: 75
End: 2020-12-02

## 2020-12-02 DIAGNOSIS — Z47.89 ORTHOPEDIC AFTERCARE: ICD-10-CM

## 2020-12-02 DIAGNOSIS — Z96.652 S/P TKR (TOTAL KNEE REPLACEMENT), LEFT: Primary | ICD-10-CM

## 2020-12-02 PROCEDURE — 99024 POSTOP FOLLOW-UP VISIT: CPT | Performed by: ORTHOPAEDIC SURGERY

## 2020-12-02 RX ORDER — TRAZODONE HYDROCHLORIDE 50 MG/1
TABLET ORAL
COMMUNITY
Start: 2020-11-20 | End: 2021-10-04

## 2020-12-02 NOTE — PROGRESS NOTES
[facility] [place] [facility]opa[facility] Surgery Clinic Note    Subjective     Chief Complaint   Patient presents with   • Post-op Follow-up     6 week follow up - 9 weeks s/p (L) TKA 09/29/2020        HPI    It has been 6  week(s) since Ms. Young's last visit. She returns to clinic today for postoperative follow-up of left knee arthroplasty. She rates her pain a 2/10 on the pain scale. Previous/current treatments: bracing, cane/walker, NSAIDS, physical therapy. Current symptoms: pain and stiffness. The pain is worse with climbing stairs; ice improve the pain. Overall, she is doing better.  Over 90% improvement compared to preoperative symptoms.  Ambulatory without external aids.    I have reviewed the following portions of the patient's history and agree with: History of Present Illness and Review of Systems    Patient Active Problem List   Diagnosis   • Abnormal stress test   • Primary osteoarthritis of left knee   • Status post total left knee replacement   • CHF (congestive heart failure) (CMS/HCC)   • HTN (hypertension)   • Hyperlipidemia   • Leukocytosis, mild, likely reactive   • Acute postoperative pain   • Chest pain, atypical   • Hypophosphatemia, replaced   • Hypomagnesemia, replaced     Past Medical History:   Diagnosis Date   • Arthritis    • CHF (congestive heart failure) (CMS/HCC)    • Depression    • GERD (gastroesophageal reflux disease)    • History of heart attack    • Hyperlipidemia    • Hypertension    • Macular degeneration    • Wears glasses       Past Surgical History:   Procedure Laterality Date   • CARDIAC CATHETERIZATION     • CARDIAC CATHETERIZATION N/A 9/16/2019    Procedure: Left Heart Cath (62388);  Surgeon: Ronny Hardin MD;  Location:  [facility] INVASIVE LOCATION;  Service: Cardiovascular   • CHOLECYSTECTOMY     • COLONOSCOPY     • CORONARY ARTERY BYPASS GRAFT     • TOTAL KNEE ARTHROPLASTY Left 9/29/2020    Procedure: TOTAL KNEE ARTHROPLASTY LEFT;  Surgeon: Heladio Arroyo MD;  Location:   [facility];  Service: Orthopedics;  Laterality: Left;      No family history on file.  Social History     Socioeconomic History   • Marital status:      Spouse name: Not on file   • Number of children: Not on file   • Years of education: Not on file   • Highest education level: Not on file   Tobacco Use   • Smoking status: Former Smoker     Quit date: 1988     Years since quittin.9   • Smokeless tobacco: Never Used   Substance and Sexual Activity   • Alcohol use: No   • Drug use: No   • Sexual activity: Defer      Current Outpatient Medications on File Prior to Visit   Medication Sig Dispense Refill   • apixaban (ELIQUIS) 2.5 MG tablet tablet Take 1 tablet by mouth Every 12 (Twelve) Hours. For 1 month  Indications: Post Surgical - Knee 60 tablet 0   • aspirin 81 MG chewable tablet Chew 1 tablet Daily. Resume in 1 month     • carvedilol (COREG) 3.125 MG tablet Take 1 tablet by mouth 2 (Two) Times a Day. 180 tablet 3   • docusate sodium (Colace) 100 MG capsule Take 1 capsule by mouth 2 (Two) Times a Day. 60 capsule 0   • Evolocumab (Repatha) 140 MG/ML solution prefilled syringe Inject 1 mL under the skin into the appropriate area as directed Every 30 (Thirty) Days. Resume after follow-up with Dr. Arroyo 2.1 mL    • FLUoxetine (PROzac) 20 MG capsule Take 20 mg by mouth Daily.     • isosorbide mononitrate (IMDUR) 30 MG 24 hr tablet Take 1 tablet by mouth Daily. 30 tablet 0   • meloxicam (MOBIC) 15 MG tablet Take 1 tablet by mouth Daily. Must take an hour after aspirin if needed.     • Multiple Vitamins-Minerals (PRESERVISION AREDS PO) Take 1 tablet by mouth Daily.     • NITROGLYCERIN PATCH      • pantoprazole (PROTONIX) 40 MG EC tablet Take 40 mg by mouth 2 (two) times a day.     • spironolactone (ALDACTONE) 25 MG tablet Take 1 tablet by mouth Daily. 30 tablet 11   • traZODone (DESYREL) 50 MG tablet TK 1 T PO QHS PRF SLP       No current facility-administered medications on file prior to visit.       Allergies    Allergen Reactions   • Lipitor [Atorvastatin] Myalgia   • Ezetimibe Myalgia   • Statins Myalgia        Review of Systems     Objective      Physical Exam  There were no vitals taken for this visit.    There is no height or weight on file to calculate BMI.    General:   Mental Status:  Alert   Appearance: Cooperative, in no acute distress   Build and Nutrition: Well-nourished well-developed female   Orientation: Alert and oriented to person, place and time   Posture: Normal   Gait: Normal    Integument:   Left knee: Wound is well-healed with no signs of infection    Lower Extremities:   Left Knee:    Tenderness:  None    Effusion:  Trace    Swelling: None    Crepitus:  None    Range of motion:  Extension: 0°       Flexion: 120°  Instability:  No varus laxity, no valgus laxity, negative anterior drawer  Deformities:  None          Assessment and Plan     Diagnoses and all orders for this visit:    1. S/P TKR (total knee replacement), left (Primary)    2. Orthopedic aftercare        1. S/P TKR (total knee replacement), left    2. Orthopedic aftercare        Reviewed my findings with the patient today.  Her left total knee arthroplasty is functioning well, and she is pleased with results.  I will see her back in 10 months, which will be a 1 year checkup, but sooner for any problems.    Return in about 10 months (around 10/2/2021) for Recheck with X-Rays.      Heladio Arroyo MD  12/02/20  14:46 SCOTT Ho disclaimer:  Much of this encounter note is an electronic transcription/translation of spoken language to printed text. The electronic translation of spoken language may permit erroneous, or at times, nonsensical words or phrases to be inadvertently transcribed; Although I have reviewed the note for such errors, some may still exist.

## 2021-03-09 ENCOUNTER — TELEPHONE (OUTPATIENT)
Dept: ORTHOPEDIC SURGERY | Facility: CLINIC | Age: 76
End: 2021-03-09

## 2021-03-09 NOTE — TELEPHONE ENCOUNTER
I spoke to Dr. Garland, he was here in the office today about patients questions about taking an antibiotic before an eye injection.He said that she does not have to take an antibiotic before an eye injection, I let her know this and she understood.

## 2021-03-09 NOTE — TELEPHONE ENCOUNTER
PATIENT CALLED WANTING TO KNOW IF SHE NEED TO TAKE ANTIBIOTICS BEFORE SHE GETS SHOTS IN HER EYES. PATIENT ALSO WOULD LIKE TO KNOW WOULD SHE HAVE TO TAKE ANTIBIOTICS BEFORE EVERY PROCEDURE SHE HAS. SHE WOULD LIKE A CALL BACK -[phone].

## 2021-10-04 ENCOUNTER — OFFICE VISIT (OUTPATIENT)
Dept: ORTHOPEDIC SURGERY | Facility: CLINIC | Age: 76
End: 2021-10-04

## 2021-10-04 VITALS
WEIGHT: 148.2 LBS | DIASTOLIC BLOOD PRESSURE: 80 MMHG | HEIGHT: 63 IN | BODY MASS INDEX: 26.26 KG/M2 | HEART RATE: 70 BPM | SYSTOLIC BLOOD PRESSURE: 168 MMHG

## 2021-10-04 DIAGNOSIS — Z09 POSTOPERATIVE EXAMINATION: ICD-10-CM

## 2021-10-04 DIAGNOSIS — S93.491A SPRAIN OF ANTERIOR TALOFIBULAR LIGAMENT OF RIGHT ANKLE, INITIAL ENCOUNTER: ICD-10-CM

## 2021-10-04 DIAGNOSIS — Z96.652 S/P TKR (TOTAL KNEE REPLACEMENT), LEFT: Primary | ICD-10-CM

## 2021-10-04 PROCEDURE — 99214 OFFICE O/P EST MOD 30 MIN: CPT | Performed by: ORTHOPAEDIC SURGERY

## 2021-10-04 ASSESSMENT — KOOS JR
KOOS JR SCORE: 4
KOOS JR SCORE: 76.332

## 2021-10-04 NOTE — PROGRESS NOTES
[facility] [place] [facility] Note    Subjective     Chief Complaint   Patient presents with   • Right Foot - Pain   • Follow-up     10 months follow up: 1 year s/p (L) TKA 09/29/2020         HPI    Alicia Young is a 75 y.o. female who follows up for her her left total knee arthroplasty, as well as a new problem of a right ankle injury. The patient states that her left total knee arthroplasty is functioning well, and she rates her left knee as 100 percent improved compared to before surgery. She denies any pain in the left knee. Her only complaint is some tightness in the knee, but she is overall very pleased with the results of her surgery.    The patient states that she twisted her right foot and ankle yesterday while walking to the car. She had some swelling laterally and wanted to have that checked out today.    I have reviewed the following portions of the patient's history and agree with: History of Present Illness and Review of Systems    Patient Active Problem List   Diagnosis   • Abnormal stress test   • Primary osteoarthritis of left knee   • Status post total left knee replacement   • CHF (congestive heart failure) ([facility])   • HTN (hypertension)   • Hyperlipidemia   • Leukocytosis, mild, likely reactive   • Acute postoperative pain   • Chest pain, atypical   • Hypophosphatemia, replaced   • Hypomagnesemia, replaced     Past Medical History:   Diagnosis Date   • Arthritis    • CHF (congestive heart failure) ([facility])    • Depression    • GERD (gastroesophageal reflux disease)    • History of heart attack    • Hyperlipidemia    • Hypertension    • Macular degeneration    • Wears glasses       Past Surgical History:   Procedure Laterality Date   • CARDIAC CATHETERIZATION     • CARDIAC CATHETERIZATION N/A 9/16/2019    Procedure: Left Heart Cath (76784);  Surgeon: Ronny Hardin MD;  Location: formerly [facility] INVASIVE LOCATION;  Service: Cardiovascular   • CHOLECYSTECTOMY     • COLONOSCOPY     • CORONARY  ARTERY BYPASS GRAFT     • TOTAL KNEE ARTHROPLASTY Left 2020    Procedure: TOTAL KNEE ARTHROPLASTY LEFT;  Surgeon: Heladio Arroyo MD;  Location: [facility];  Service: Orthopedics;  Laterality: Left;      History reviewed. No pertinent family history.  Social History     Socioeconomic History   • Marital status:      Spouse name: Not on file   • Number of children: Not on file   • Years of education: Not on file   • Highest education level: Not on file   Tobacco Use   • Smoking status: Former Smoker     Quit date: 1988     Years since quittin.7   • Smokeless tobacco: Never Used   Substance and Sexual Activity   • Alcohol use: No   • Drug use: No   • Sexual activity: Defer      Current Outpatient Medications on File Prior to Visit   Medication Sig Dispense Refill   • Ascorbic Acid (VITAMIN C PO) Take  by mouth.     • aspirin 81 MG chewable tablet Chew 1 tablet Daily. Resume in 1 month     • B Complex-C (SUPER B COMPLEX PO) Take  by mouth.     • carvedilol (COREG) 3.125 MG tablet Take 1 tablet by mouth 2 (Two) Times a Day. 180 tablet 3   • Evolocumab (Repatha) 140 MG/ML solution prefilled syringe Inject 1 mL under the skin into the appropriate area as directed Every 30 (Thirty) Days. Resume after follow-up with Dr. Arroyo 2.1 mL    • FLUoxetine (PROzac) 20 MG capsule Take 20 mg by mouth Daily.     • Multiple Vitamins-Minerals (PRESERVISION AREDS PO) Take 1 tablet by mouth Daily.     • pantoprazole (PROTONIX) 40 MG EC tablet Take 40 mg by mouth 2 (two) times a day.     • spironolactone (ALDACTONE) 25 MG tablet Take 1 tablet by mouth Daily. 30 tablet 11   • [DISCONTINUED] apixaban (ELIQUIS) 2.5 MG tablet tablet Take 1 tablet by mouth Every 12 (Twelve) Hours. For 1 month  Indications: Post Surgical - Knee 60 tablet 0   • [DISCONTINUED] docusate sodium (Colace) 100 MG capsule Take 1 capsule by mouth 2 (Two) Times a Day. 60 capsule 0   • [DISCONTINUED] isosorbide mononitrate (IMDUR) 30 MG 24 hr tablet  "Take 1 tablet by mouth Daily. 30 tablet 0   • [DISCONTINUED] meloxicam (MOBIC) 15 MG tablet Take 1 tablet by mouth Daily. Must take an hour after aspirin if needed.     • [DISCONTINUED] NITROGLYCERIN PATCH      • [DISCONTINUED] traZODone (DESYREL) 50 MG tablet TK 1 T PO QHS PRF SLP       No current facility-administered medications on file prior to visit.      Allergies   Allergen Reactions   • Lipitor [Atorvastatin] Myalgia   • Ezetimibe Myalgia   • Statins Myalgia        Review of Systems   Constitutional: Negative.    HENT: Negative.    Eyes: Negative.    Respiratory: Negative.  Cough: 3.    Cardiovascular: Negative.    Gastrointestinal: Negative.    Endocrine: Negative.    Genitourinary: Negative.    Musculoskeletal: Positive for arthralgias.   Skin: Negative.    Allergic/Immunologic: Negative.    Neurological: Negative.    Hematological: Negative.    Psychiatric/Behavioral: Negative.         Objective      Physical Exam  /80   Pulse 70   Ht 160 cm (62.99\")   Wt 67.2 kg (148 lb 3.2 oz)   BMI 26.26 kg/m²     Body mass index is 26.26 kg/m².    General:   Mental Status:  Alert   Appearance: Cooperative, in no acute distress   Build and Nutrition: Well-nourished well-developed female   Orientation: Alert and oriented to person, place and time   Posture: Normal   Gait: Mildly antalgic on the right    Integument  • Left knee: Wound is well-healed with no signs of infection  • Right ankle: No skin lesions, rash, or ecchymosis.    Neurologic  • Sensation       • Right foot: Intact to light touch on the dorsal and plantar aspects.     Motor       • Right lower extremity:  5/5 ankle dorsiflexors and ankle plantar flexors.    Lower Extremities  • Left Knee:  • Tenderness: No medial or lateral joint line tenderness.  • Swelling: None  • Effusion: None  • Crepitus: None  • Atrophy: None  • Range of motion:     • Extension: 0°     • Flexion: 125°  • Instability: No varus or valgus laxity. Negative anterior " drawer.  • Deformities: None    Upper Extremities  • Right Ankle:  • Tenderness: Tenderness over the lateral hindfoot. Tenderness over the anterior aspect of the talus and over the anterior talofibular ligament. No lateral or medial tenderness on the medial or lateral malleoli.  • Effusion: None  • Swelling: None  • Range of motion:     • Ankle dorsiflexion: 5°     • Ankle plantar flexion: 20°  • Deformities: None    Imaging/Studies  Imaging Results (Last 24 Hours)     Procedure Component Value Units Date/Time    XR Ankle 3+ View Right [[id number]] Resulted: 10/04/21 1401     Updated: 10/04/21 1402    Narrative:      Right Ankle Radiographs  Indication: right ankle pain  Views: AP, mortise and lateral of the right ankle    Comparison: no prior studies available for review    Findings:   No acute bony abnormalities.  Good alignment.  Metallic clip seen   medially, from postsurgical changes.    XR Foot 3+ View Right [[id number]] Resulted: 10/04/21 1400     Updated: 10/04/21 1401    Narrative:      Right Foot Radiographs  Indication: right foot pain  Views: Weight-bearing AP and lateral, with oblique views of the right foot    Comparison: no prior studies available for review    Findings:  No clear evidence of acute fracture, with small calcification seen   adjacent to the anterior aspect of the talar head on the lateral view,   with no other unusual bony features.    XR Knee 3+ View With Sunrise Left [[id number]] Resulted: 10/04/21 1359     Updated: 10/04/21 1359    Narrative:      Left Knee Radiographs  Indication: status-post left total knee arthroplasty  Views: AP, lateral, and sunrise views of the left knee    Comparison: no change compared to prior study, 10/21/2020    Findings:   The components are well aligned, with no signs of loosening or failure.            Assessment and Plan     Diagnoses and all orders for this visit:    1. S/P TKR (total knee replacement), left (Primary)  -     XR Knee 3+ View With [place]  Left    2. Postoperative examination    3. Sprain of anterior talofibular ligament of right ankle, initial encounter  -     XR Foot 3+ View Right  -     XR Ankle 3+ View Right        1. S/P TKR (total knee replacement), left    2. Postoperative examination    3. Sprain of anterior talofibular ligament of right ankle, initial encounter        I discussed my findings with the patient today. Her left total knee arthroplasty is functioning well, and she is very pleased with the results of her surgery.  I recommended routine monitoring of that knee with a follow-up in 4 years.    In regards to her right ankle, I suspect that she sprained it. I do not feel that any formal therapy, cast, or boot is indicated at this time. We will place her into an ankle brace today, which I recommend she wear with ambulation for at least 2 weeks. She may discontinue the brace at that time if she is free of pain and swelling. I advised her to use a cane for ambulation until she is comfortable without it. I also recommended range of motion exercises for the ankle, as well as ice and elevation.    The patient will follow up in 4 weeks for a recheck of her right ankle.    Return in about 4 weeks (around 11/1/2021).      Transcribed from ambient dictation for Heladio Arroyo MD by Michelle English.  10/04/21   14:22 EDT    I have personally performed the services described in this document as transcribed by the above individual, and it is both accurate and complete.  Heladio Arroyo MD  10/4/2021  18:45 EDT

## 2021-11-01 ENCOUNTER — OFFICE VISIT (OUTPATIENT)
Dept: ORTHOPEDIC SURGERY | Facility: CLINIC | Age: 76
End: 2021-11-01

## 2021-11-01 VITALS
SYSTOLIC BLOOD PRESSURE: 138 MMHG | BODY MASS INDEX: 26.25 KG/M2 | HEIGHT: 63 IN | WEIGHT: 148.15 LBS | DIASTOLIC BLOOD PRESSURE: 78 MMHG

## 2021-11-01 DIAGNOSIS — S93.491D SPRAIN OF ANTERIOR TALOFIBULAR LIGAMENT OF RIGHT ANKLE, SUBSEQUENT ENCOUNTER: Primary | ICD-10-CM

## 2021-11-01 PROCEDURE — 99212 OFFICE O/P EST SF 10 MIN: CPT | Performed by: ORTHOPAEDIC SURGERY

## 2021-11-01 NOTE — PROGRESS NOTES
[facility] [place] [facility] [place]    Subjective     Chief Complaint   Patient presents with   • Follow-up     4 week follow up - Sprain of anterior talofibular ligament of right ankle        HPI    It has been 4  week(s) since Ms. Young's last visit. She returns to clinic today for follow-up of right ankle pain. The issue has been ongoing for 1 month(s). She rates her pain a 0/10 on the pain scale. Previous/current treatments: bracing. Overall, she is doing better.  Fully ambulatory in regular shoe gear.  No complaints.  Ankle pain has resolved.    I have reviewed the following portions of the patient's history and agree with: History of Present Illness and Review of Systems    Patient Active Problem List   Diagnosis   • Abnormal stress test   • Primary osteoarthritis of left knee   • Status post total left knee replacement   • CHF (congestive heart failure) ([facility])   • HTN (hypertension)   • Hyperlipidemia   • Leukocytosis, mild, likely reactive   • Acute postoperative pain   • Chest pain, atypical   • Hypophosphatemia, replaced   • Hypomagnesemia, replaced     Past Medical History:   Diagnosis Date   • Arthritis    • CHF (congestive heart failure) ([facility])    • Depression    • GERD (gastroesophageal reflux disease)    • History of heart attack    • Hyperlipidemia    • Hypertension    • Macular degeneration    • Wears glasses       Past Surgical History:   Procedure Laterality Date   • CARDIAC CATHETERIZATION     • CARDIAC CATHETERIZATION N/A 9/16/2019    Procedure: Left Heart Cath (56306);  Surgeon: Ronny Hardin MD;  Location:  [facility] INVASIVE LOCATION;  Service: Cardiovascular   • CHOLECYSTECTOMY     • COLONOSCOPY     • CORONARY ARTERY BYPASS GRAFT     • TOTAL KNEE ARTHROPLASTY Left 9/29/2020    Procedure: TOTAL KNEE ARTHROPLASTY LEFT;  Surgeon: Heladio Arroyo MD;  Location: [facility] OR;  Service: Orthopedics;  Laterality: Left;      No family history on file.  Social History     Socioeconomic  History   • Marital status:    Tobacco Use   • Smoking status: Former Smoker     Quit date: 1988     Years since quittin.8   • Smokeless tobacco: Never Used   Substance and Sexual Activity   • Alcohol use: No   • Drug use: No   • Sexual activity: Defer      Current Outpatient Medications on File Prior to Visit   Medication Sig Dispense Refill   • Ascorbic Acid (VITAMIN C PO) Take  by mouth.     • aspirin 81 MG chewable tablet Chew 1 tablet Daily. Resume in 1 month     • B Complex-C (SUPER B COMPLEX PO) Take  by mouth.     • carvedilol (COREG) 3.125 MG tablet Take 1 tablet by mouth 2 (Two) Times a Day. 180 tablet 3   • Evolocumab (Repatha) 140 MG/ML solution prefilled syringe Inject 1 mL under the skin into the appropriate area as directed Every 30 (Thirty) Days. Resume after follow-up with Dr. Arroyo 2.1 mL    • FLUoxetine (PROzac) 20 MG capsule Take 20 mg by mouth Daily.     • Multiple Vitamins-Minerals (PRESERVISION AREDS PO) Take 1 tablet by mouth Daily.     • pantoprazole (PROTONIX) 40 MG EC tablet Take 40 mg by mouth 2 (two) times a day.     • spironolactone (ALDACTONE) 25 MG tablet Take 1 tablet by mouth Daily. 30 tablet 11     No current facility-administered medications on file prior to visit.      Allergies   Allergen Reactions   • Lipitor [Atorvastatin] Myalgia   • Ezetimibe Myalgia   • Statins Myalgia        Review of Systems   Constitutional: Negative for activity change, appetite change, chills, diaphoresis, fatigue, fever and unexpected weight change.   HENT: Negative for congestion, dental problem, drooling, ear discharge, ear pain, facial swelling, hearing loss, mouth sores, nosebleeds, postnasal drip, rhinorrhea, sinus pressure, sneezing, sore throat, tinnitus, trouble swallowing and voice change.    Eyes: Negative for photophobia, pain, discharge, redness, itching and visual disturbance.   Respiratory: Negative for apnea, cough, choking, chest tightness, shortness of breath, wheezing  "and stridor.    Cardiovascular: Negative for chest pain, palpitations and leg swelling.   Gastrointestinal: Negative for abdominal distention, abdominal pain, anal bleeding, blood in stool, constipation, diarrhea, nausea, rectal pain and vomiting.   Endocrine: Negative for cold intolerance, heat intolerance, polydipsia, polyphagia and polyuria.   Genitourinary: Negative for decreased urine volume, difficulty urinating, dysuria, enuresis, flank pain, frequency, genital sores, hematuria and urgency.   Musculoskeletal: Positive for arthralgias. Negative for back pain, gait problem, joint swelling, myalgias, neck pain and neck stiffness.   Skin: Negative for color change, pallor, rash and wound.   Allergic/Immunologic: Negative for environmental allergies, food allergies and immunocompromised state.   Neurological: Negative for dizziness, tremors, seizures, syncope, facial asymmetry, speech difficulty, weakness, light-headedness, numbness and headaches.   Hematological: Negative for adenopathy. Does not bruise/bleed easily.   Psychiatric/Behavioral: Negative for agitation, behavioral problems, confusion, decreased concentration, dysphoric mood, hallucinations, self-injury, sleep disturbance and suicidal ideas. The patient is not nervous/anxious and is not hyperactive.         Objective      Physical Exam  /78   Ht 160 cm (62.99\")   Wt 67.2 kg (148 lb 2.4 oz)   BMI 26.25 kg/m²     Body mass index is 26.25 kg/m².    General:   Mental Status:  Alert   Appearance: Cooperative, in no acute distress   Build and Nutrition: Well-nourished well-developed female   Orientation: Alert and oriented to person, place and time   Posture: Normal   Gait: Normal    Integument:   Right ankle: No skin lesions, no rash, no ecchymosis    Lower Extremities:   Right Ankle:    Tenderness:  None    Effusion:  None    Swelling:  None    Range of motion:  Ankle dorsiflexion: 15°       Ankle plantarflexion: 30°  Deformities: "  None        Imaging/Studies    No new imaging      Assessment and Plan     Diagnoses and all orders for this visit:    1. Sprain of anterior talofibular ligament of right ankle, subsequent encounter (Primary)        1. Sprain of anterior talofibular ligament of right ankle, subsequent encounter        I reviewed my findings with the patient today.  Her right ankle sprain has healed well, and I will see her back if there are any issues in the future.    Return if symptoms worsen or fail to improve.      Heladio Arroyo MD  11/01/21  15:07 EDT

## 2023-01-01 NOTE — TELEPHONE ENCOUNTER
Dr. Arroyo's response:    If she is having nosebleeds, she can stop the Eliquis, but continue her baby aspirin.  I believe she has been on her baby aspirin for quite some time.  I did put in an order for physical therapy in epic.    Previous C/S

## 2024-04-17 ENCOUNTER — APPOINTMENT (OUTPATIENT)
Dept: GENERAL RADIOLOGY | Facility: HOSPITAL | Age: 79
End: 2024-04-17
Payer: MEDICARE

## 2024-04-17 ENCOUNTER — HOSPITAL ENCOUNTER (OUTPATIENT)
Facility: HOSPITAL | Age: 79
Setting detail: OBSERVATION
Discharge: HOME | End: 2024-04-18
Attending: EMERGENCY MEDICINE | Admitting: PEDIATRICS
Payer: MEDICARE

## 2024-04-17 ENCOUNTER — APPOINTMENT (OUTPATIENT)
Dept: CT IMAGING | Facility: HOSPITAL | Age: 79
End: 2024-04-17
Payer: MEDICARE

## 2024-04-17 DIAGNOSIS — I21.4 NSTEMI (NON-ST ELEVATED MYOCARDIAL INFARCTION): Primary | ICD-10-CM

## 2024-04-17 DIAGNOSIS — R11.2 NAUSEA AND VOMITING, UNSPECIFIED VOMITING TYPE: ICD-10-CM

## 2024-04-17 PROBLEM — I20.0 UNSTABLE ANGINA: Status: ACTIVE | Noted: 2024-04-17

## 2024-04-17 LAB
ALBUMIN SERPL-MCNC: 4.2 G/DL (ref 3.5–5.2)
ALBUMIN/GLOB SERPL: 1.4 G/DL
ALP SERPL-CCNC: 77 U/L (ref 39–117)
ALT SERPL W P-5'-P-CCNC: 26 U/L (ref 1–33)
ANION GAP SERPL CALCULATED.3IONS-SCNC: 18 MMOL/L (ref 5–15)
AST SERPL-CCNC: 34 U/L (ref 1–32)
BASOPHILS # BLD AUTO: 0.04 10*3/MM3 (ref 0–0.2)
BASOPHILS NFR BLD AUTO: 0.3 % (ref 0–1.5)
BILIRUB SERPL-MCNC: 0.7 MG/DL (ref 0–1.2)
BILIRUB UR QL STRIP: NEGATIVE
BUN SERPL-MCNC: 10 MG/DL (ref 8–23)
BUN/CREAT SERPL: 12.2 (ref 7–25)
CALCIUM SPEC-SCNC: 9.7 MG/DL (ref 8.6–10.5)
CHLORIDE SERPL-SCNC: 98 MMOL/L (ref 98–107)
CLARITY UR: CLEAR
CO2 SERPL-SCNC: 20 MMOL/L (ref 22–29)
COLOR UR: YELLOW
CREAT BLDA-MCNC: 0.7 MG/DL (ref 0.6–1.3)
CREAT SERPL-MCNC: 0.82 MG/DL (ref 0.57–1)
DEPRECATED RDW RBC AUTO: 43.8 FL (ref 37–54)
EGFRCR SERPLBLD CKD-EPI 2021: 73.3 ML/MIN/1.73
EOSINOPHIL # BLD AUTO: 0.08 10*3/MM3 (ref 0–0.4)
EOSINOPHIL NFR BLD AUTO: 0.6 % (ref 0.3–6.2)
ERYTHROCYTE [DISTWIDTH] IN BLOOD BY AUTOMATED COUNT: 12.3 % (ref 12.3–15.4)
FLUAV RNA RESP QL NAA+PROBE: NOT DETECTED
FLUBV RNA RESP QL NAA+PROBE: NOT DETECTED
GLOBULIN UR ELPH-MCNC: 3.1 GM/DL
GLUCOSE SERPL-MCNC: 194 MG/DL (ref 65–99)
GLUCOSE UR STRIP-MCNC: NEGATIVE MG/DL
HBA1C MFR BLD: 5.6 % (ref 4.8–5.6)
HCT VFR BLD AUTO: 44.3 % (ref 34–46.6)
HGB BLD-MCNC: 14.8 G/DL (ref 12–15.9)
HGB UR QL STRIP.AUTO: NEGATIVE
HOLD SPECIMEN: NORMAL
IMM GRANULOCYTES # BLD AUTO: 0.1 10*3/MM3 (ref 0–0.05)
IMM GRANULOCYTES NFR BLD AUTO: 0.8 % (ref 0–0.5)
KETONES UR QL STRIP: ABNORMAL
LEUKOCYTE ESTERASE UR QL STRIP.AUTO: NEGATIVE
LIPASE SERPL-CCNC: 40 U/L (ref 13–60)
LYMPHOCYTES # BLD AUTO: 1.4 10*3/MM3 (ref 0.7–3.1)
LYMPHOCYTES NFR BLD AUTO: 11.3 % (ref 19.6–45.3)
MCH RBC QN AUTO: 32.2 PG (ref 26.6–33)
MCHC RBC AUTO-ENTMCNC: 33.4 G/DL (ref 31.5–35.7)
MCV RBC AUTO: 96.3 FL (ref 79–97)
MONOCYTES # BLD AUTO: 0.82 10*3/MM3 (ref 0.1–0.9)
MONOCYTES NFR BLD AUTO: 6.6 % (ref 5–12)
NEUTROPHILS NFR BLD AUTO: 80.4 % (ref 42.7–76)
NEUTROPHILS NFR BLD AUTO: 9.94 10*3/MM3 (ref 1.7–7)
NITRITE UR QL STRIP: NEGATIVE
NRBC BLD AUTO-RTO: 0 /100 WBC (ref 0–0.2)
NT-PROBNP SERPL-MCNC: 1423 PG/ML (ref 0–1800)
PH UR STRIP.AUTO: 8.5 [PH] (ref 5–8)
PLATELET # BLD AUTO: 217 10*3/MM3 (ref 140–450)
PMV BLD AUTO: 10.9 FL (ref 6–12)
POTASSIUM SERPL-SCNC: 3.8 MMOL/L (ref 3.5–5.2)
PROT SERPL-MCNC: 7.3 G/DL (ref 6–8.5)
PROT UR QL STRIP: NEGATIVE
QT INTERVAL: 514 MS
QT INTERVAL: 516 MS
QTC INTERVAL: 504 MS
QTC INTERVAL: 510 MS
RBC # BLD AUTO: 4.6 10*6/MM3 (ref 3.77–5.28)
RSV RNA RESP QL NAA+PROBE: NOT DETECTED
SARS-COV-2 RNA RESP QL NAA+PROBE: NOT DETECTED
SODIUM SERPL-SCNC: 136 MMOL/L (ref 136–145)
SP GR UR STRIP: 1.03 (ref 1–1.03)
TROPONIN T SERPL HS-MCNC: 17 NG/L
TROPONIN T SERPL HS-MCNC: 23 NG/L
TROPONIN T SERPL HS-MCNC: 25 NG/L
UROBILINOGEN UR QL STRIP: ABNORMAL
WBC NRBC COR # BLD AUTO: 12.38 10*3/MM3 (ref 3.4–10.8)
WHOLE BLOOD HOLD COAG: NORMAL
WHOLE BLOOD HOLD SPECIMEN: NORMAL

## 2024-04-17 PROCEDURE — 93005 ELECTROCARDIOGRAM TRACING: CPT | Performed by: NURSE PRACTITIONER

## 2024-04-17 PROCEDURE — 99223 1ST HOSP IP/OBS HIGH 75: CPT | Performed by: INTERNAL MEDICINE

## 2024-04-17 PROCEDURE — 25010000002 ENOXAPARIN PER 10 MG: Performed by: INTERNAL MEDICINE

## 2024-04-17 PROCEDURE — G0378 HOSPITAL OBSERVATION PER HR: HCPCS

## 2024-04-17 PROCEDURE — 82565 ASSAY OF CREATININE: CPT

## 2024-04-17 PROCEDURE — 70450 CT HEAD/BRAIN W/O DYE: CPT

## 2024-04-17 PROCEDURE — 93005 ELECTROCARDIOGRAM TRACING: CPT | Performed by: EMERGENCY MEDICINE

## 2024-04-17 PROCEDURE — 96374 THER/PROPH/DIAG INJ IV PUSH: CPT

## 2024-04-17 PROCEDURE — 85025 COMPLETE CBC W/AUTO DIFF WBC: CPT | Performed by: EMERGENCY MEDICINE

## 2024-04-17 PROCEDURE — 25010000002 ONDANSETRON PER 1 MG: Performed by: NURSE PRACTITIONER

## 2024-04-17 PROCEDURE — 84484 ASSAY OF TROPONIN QUANT: CPT | Performed by: INTERNAL MEDICINE

## 2024-04-17 PROCEDURE — 84484 ASSAY OF TROPONIN QUANT: CPT | Performed by: EMERGENCY MEDICINE

## 2024-04-17 PROCEDURE — 80053 COMPREHEN METABOLIC PANEL: CPT | Performed by: EMERGENCY MEDICINE

## 2024-04-17 PROCEDURE — 25810000003 SODIUM CHLORIDE 0.9 % SOLUTION: Performed by: NURSE PRACTITIONER

## 2024-04-17 PROCEDURE — 74177 CT ABD & PELVIS W/CONTRAST: CPT

## 2024-04-17 PROCEDURE — 25010000002 SODIUM CHLORIDE 0.9 % WITH KCL 20 MEQ 20-0.9 MEQ/L-% SOLUTION: Performed by: INTERNAL MEDICINE

## 2024-04-17 PROCEDURE — 99285 EMERGENCY DEPT VISIT HI MDM: CPT

## 2024-04-17 PROCEDURE — 71275 CT ANGIOGRAPHY CHEST: CPT

## 2024-04-17 PROCEDURE — 96372 THER/PROPH/DIAG INJ SC/IM: CPT

## 2024-04-17 PROCEDURE — 83036 HEMOGLOBIN GLYCOSYLATED A1C: CPT | Performed by: INTERNAL MEDICINE

## 2024-04-17 PROCEDURE — 83880 ASSAY OF NATRIURETIC PEPTIDE: CPT | Performed by: EMERGENCY MEDICINE

## 2024-04-17 PROCEDURE — 93005 ELECTROCARDIOGRAM TRACING: CPT | Performed by: INTERNAL MEDICINE

## 2024-04-17 PROCEDURE — 71045 X-RAY EXAM CHEST 1 VIEW: CPT

## 2024-04-17 PROCEDURE — 84484 ASSAY OF TROPONIN QUANT: CPT | Performed by: NURSE PRACTITIONER

## 2024-04-17 PROCEDURE — 93010 ELECTROCARDIOGRAM REPORT: CPT | Performed by: INTERNAL MEDICINE

## 2024-04-17 PROCEDURE — 83690 ASSAY OF LIPASE: CPT | Performed by: NURSE PRACTITIONER

## 2024-04-17 PROCEDURE — 87637 SARSCOV2&INF A&B&RSV AMP PRB: CPT | Performed by: NURSE PRACTITIONER

## 2024-04-17 PROCEDURE — P9612 CATHETERIZE FOR URINE SPEC: HCPCS

## 2024-04-17 PROCEDURE — 25510000001 IOPAMIDOL PER 1 ML: Performed by: EMERGENCY MEDICINE

## 2024-04-17 PROCEDURE — 81003 URINALYSIS AUTO W/O SCOPE: CPT | Performed by: NURSE PRACTITIONER

## 2024-04-17 PROCEDURE — 36415 COLL VENOUS BLD VENIPUNCTURE: CPT

## 2024-04-17 RX ORDER — PANTOPRAZOLE SODIUM 40 MG/10ML
40 INJECTION, POWDER, LYOPHILIZED, FOR SOLUTION INTRAVENOUS
Status: DISCONTINUED | OUTPATIENT
Start: 2024-04-18 | End: 2024-04-18

## 2024-04-17 RX ORDER — SODIUM CHLORIDE 0.9 % (FLUSH) 0.9 %
10 SYRINGE (ML) INJECTION AS NEEDED
Status: DISCONTINUED | OUTPATIENT
Start: 2024-04-17 | End: 2024-04-18 | Stop reason: HOSPADM

## 2024-04-17 RX ORDER — CARVEDILOL 3.12 MG/1
3.12 TABLET ORAL 2 TIMES DAILY
Status: DISCONTINUED | OUTPATIENT
Start: 2024-04-17 | End: 2024-04-18 | Stop reason: HOSPADM

## 2024-04-17 RX ORDER — BISACODYL 10 MG
10 SUPPOSITORY, RECTAL RECTAL DAILY PRN
Status: DISCONTINUED | OUTPATIENT
Start: 2024-04-17 | End: 2024-04-18 | Stop reason: HOSPADM

## 2024-04-17 RX ORDER — NITROGLYCERIN 0.4 MG/1
0.4 TABLET SUBLINGUAL
Status: DISCONTINUED | OUTPATIENT
Start: 2024-04-17 | End: 2024-04-18 | Stop reason: HOSPADM

## 2024-04-17 RX ORDER — BISACODYL 5 MG/1
5 TABLET, DELAYED RELEASE ORAL DAILY PRN
Status: DISCONTINUED | OUTPATIENT
Start: 2024-04-17 | End: 2024-04-18 | Stop reason: HOSPADM

## 2024-04-17 RX ORDER — ONDANSETRON 2 MG/ML
4 INJECTION INTRAMUSCULAR; INTRAVENOUS EVERY 6 HOURS PRN
Status: DISCONTINUED | OUTPATIENT
Start: 2024-04-17 | End: 2024-04-18 | Stop reason: HOSPADM

## 2024-04-17 RX ORDER — ENOXAPARIN SODIUM 100 MG/ML
40 INJECTION SUBCUTANEOUS DAILY
Status: DISCONTINUED | OUTPATIENT
Start: 2024-04-17 | End: 2024-04-18 | Stop reason: HOSPADM

## 2024-04-17 RX ORDER — SODIUM CHLORIDE 0.9 % (FLUSH) 0.9 %
10 SYRINGE (ML) INJECTION EVERY 12 HOURS SCHEDULED
Status: DISCONTINUED | OUTPATIENT
Start: 2024-04-17 | End: 2024-04-18 | Stop reason: HOSPADM

## 2024-04-17 RX ORDER — ONDANSETRON 2 MG/ML
4 INJECTION INTRAMUSCULAR; INTRAVENOUS ONCE
Status: COMPLETED | OUTPATIENT
Start: 2024-04-17 | End: 2024-04-17

## 2024-04-17 RX ORDER — SODIUM CHLORIDE 9 MG/ML
40 INJECTION, SOLUTION INTRAVENOUS AS NEEDED
Status: DISCONTINUED | OUTPATIENT
Start: 2024-04-17 | End: 2024-04-18 | Stop reason: HOSPADM

## 2024-04-17 RX ORDER — POLYETHYLENE GLYCOL 3350 17 G/17G
17 POWDER, FOR SOLUTION ORAL DAILY PRN
Status: DISCONTINUED | OUTPATIENT
Start: 2024-04-17 | End: 2024-04-18 | Stop reason: HOSPADM

## 2024-04-17 RX ORDER — SODIUM CHLORIDE AND POTASSIUM CHLORIDE 150; 900 MG/100ML; MG/100ML
75 INJECTION, SOLUTION INTRAVENOUS CONTINUOUS
Status: ACTIVE | OUTPATIENT
Start: 2024-04-17 | End: 2024-04-18

## 2024-04-17 RX ORDER — ASPIRIN 81 MG/1
81 TABLET, CHEWABLE ORAL DAILY
Status: DISCONTINUED | OUTPATIENT
Start: 2024-04-18 | End: 2024-04-18 | Stop reason: HOSPADM

## 2024-04-17 RX ORDER — AMOXICILLIN 250 MG
2 CAPSULE ORAL 2 TIMES DAILY
Status: DISCONTINUED | OUTPATIENT
Start: 2024-04-17 | End: 2024-04-18 | Stop reason: HOSPADM

## 2024-04-17 RX ORDER — ONDANSETRON 4 MG/1
4 TABLET, ORALLY DISINTEGRATING ORAL EVERY 6 HOURS PRN
Status: DISCONTINUED | OUTPATIENT
Start: 2024-04-17 | End: 2024-04-18 | Stop reason: HOSPADM

## 2024-04-17 RX ORDER — ASPIRIN 81 MG/1
324 TABLET, CHEWABLE ORAL ONCE
Status: DISCONTINUED | OUTPATIENT
Start: 2024-04-17 | End: 2024-04-17

## 2024-04-17 RX ORDER — FLUOXETINE HYDROCHLORIDE 20 MG/1
20 CAPSULE ORAL DAILY
Status: DISCONTINUED | OUTPATIENT
Start: 2024-04-18 | End: 2024-04-18 | Stop reason: HOSPADM

## 2024-04-17 RX ORDER — LEVOTHYROXINE SODIUM 0.03 MG/1
25 TABLET ORAL
Status: DISCONTINUED | OUTPATIENT
Start: 2024-04-18 | End: 2024-04-18 | Stop reason: HOSPADM

## 2024-04-17 RX ADMIN — POTASSIUM CHLORIDE AND SODIUM CHLORIDE 75 ML/HR: 900; 150 INJECTION, SOLUTION INTRAVENOUS at 21:12

## 2024-04-17 RX ADMIN — ONDANSETRON 4 MG: 2 INJECTION INTRAMUSCULAR; INTRAVENOUS at 13:40

## 2024-04-17 RX ADMIN — Medication 10 ML: at 21:13

## 2024-04-17 RX ADMIN — CARVEDILOL 3.12 MG: 3.12 TABLET, FILM COATED ORAL at 21:12

## 2024-04-17 RX ADMIN — SODIUM CHLORIDE 1000 ML: 9 INJECTION, SOLUTION INTRAVENOUS at 13:40

## 2024-04-17 RX ADMIN — ENOXAPARIN SODIUM 40 MG: 100 INJECTION SUBCUTANEOUS at 21:12

## 2024-04-17 RX ADMIN — IOPAMIDOL 90 ML: 755 INJECTION, SOLUTION INTRAVENOUS at 14:48

## 2024-04-17 NOTE — H&P
[facility] Medicine Services  HISTORY AND PHYSICAL    Patient Name: Alicia Young  : 1945  MRN: [id number]  Primary Care Physician: Shamir Morales MD  Date of admission: 2024      Subjective   Subjective     Chief Complaint:  N/v, CP with exertion    HPI:  Alicia Young is a 78 y.o. female with h/o systolic CHF/EF 45%, VHD, CAD s/p CABG in  with abrupt onset of n/v today at 11:30AM while eating breakfast with her friend.  States that she became cold and sweatty and couldn't stop shaking.  She denies any CP.  She went to the [facility] initially but was sent to [facility] ER.  Now feels much better, n/v has resolved.  She denies any abdominal pain, CP, or abdominal chest burning.  She does state that she has been having CP with exertion such as with stairs for quite some time.  Her last [facility] was around  and did not have stents at that time.  She follows with Dr. Hardin for cardiology.  She feels ok currently though not hungry. She denies any SOA.        Personal History     Past Medical History:   Diagnosis Date    Arthritis     CHF (congestive heart failure)     Depression     GERD (gastroesophageal reflux disease)     History of heart attack     Hyperlipidemia     Hypertension     Macular degeneration     Nausea & vomiting 2024    Wears glasses            Past Surgical History:   Procedure Laterality Date    CARDIAC CATHETERIZATION      CARDIAC CATHETERIZATION N/A 2019    Procedure: Left Heart Cath (92397);  Surgeon: Ronny Hardin MD;  Location:  [facility];  Service: Cardiovascular    CHOLECYSTECTOMY      COLONOSCOPY      CORONARY ARTERY BYPASS GRAFT      TOTAL KNEE ARTHROPLASTY Left 2020    Procedure: TOTAL KNEE ARTHROPLASTY LEFT;  Surgeon: Heladio Arroyo MD;  Location: [facility] OR;  Service: Orthopedics;  Laterality: Left;       Family History: family history is not on file.     Social History:  reports that she quit smoking about  36 years ago. Her smoking use included cigarettes. She has been exposed to tobacco smoke. She has never used smokeless tobacco. She reports that she does not drink alcohol and does not use drugs.  Social History     Social History Narrative    Not on file       Medications:  Available home medication information reviewed.  Ascorbic Acid, B Complex-C, Evolocumab, FLUoxetine, aspirin, carvedilol, levothyroxine, multivitamin with minerals, pantoprazole, and spironolactone    Allergies   Allergen Reactions    Lipitor [Atorvastatin] Myalgia    Ezetimibe Myalgia    Statins Myalgia       Objective   Objective     Vital Signs:   Temp:  [97.7 °F (36.5 °C)] 97.7 °F (36.5 °C)  Heart Rate:  [58-71] 71  Resp:  [18] 18  BP: (143-180)/(60-82) 143/60       Physical Exam   Constitutional: Awake, alert, sitting up in bed, daughter at bedside  Eyes: PERRLA, sclerae anicteric, no conjunctival injection  HENT: NCAT, mucous membranes moist  Neck: Supple, no thyromegaly, no lymphadenopathy, trachea midline  Respiratory: Clear to auscultation bilaterally, nonlabored respirations   Cardiovascular: RRR, 3/6 systolic murmur greatest at RUSB  Gastrointestinal: Positive bowel sounds, soft, nontender, nondistended  Musculoskeletal: No bilateral ankle edema, no clubbing or cyanosis to extremities  Psychiatric: Appropriate affect, cooperative  Neurologic: Oriented x 3, strength symmetric in all extremities, Cranial Nerves grossly intact to confrontation, speech clear  Skin: No rashes      Result Review:  I have personally reviewed the results from the time of this admission to 4/17/2024 18:40 EDT and agree with these findings:  [x]  Laboratory list / accordion  [x]  Microbiology  [x]  Radiology  [x]  EKG/Telemetry   []  Cardiology/Vascular   []  Pathology  []  Old records  []  Other:  Most notable findings include:       LAB RESULTS:      Lab 04/17/24  1333   WBC 12.38*   HEMOGLOBIN 14.8   HEMATOCRIT 44.3   PLATELETS 217   NEUTROS ABS 9.94*    IMMATURE GRANS (ABS) 0.10*   LYMPHS ABS 1.40   MONOS ABS 0.82   EOS ABS 0.08   MCV 96.3         Lab 04/17/24  1354 04/17/24  1333   SODIUM  --  136   POTASSIUM  --  3.8   CHLORIDE  --  98   CO2  --  20.0*   ANION GAP  --  18.0*   BUN  --  10   CREATININE 0.70 0.82   EGFR  --  73.3   GLUCOSE  --  194*   CALCIUM  --  9.7         Lab 04/17/24  1333   TOTAL PROTEIN 7.3   ALBUMIN 4.2   GLOBULIN 3.1   ALT (SGPT) 26   AST (SGOT) 34*   BILIRUBIN 0.7   ALK PHOS 77   LIPASE 40         Lab 04/17/24  1638 04/17/24  1333   PROBNP  --  1,423.0   HSTROP T 23* 17*                 UA          4/17/2024    15:34   Urinalysis   Specific Gravity, UA 1.026    Ketones, UA 15 mg/dL (1+)    Blood, UA Negative    Leukocytes, UA Negative    Nitrite, UA Negative        Microbiology Results (last 10 days)       Procedure Component Value - Date/Time    COVID PRE-OP / PRE-PROCEDURE SCREENING ORDER (NO ISOLATION) - Swab, Nasopharynx [[id number]]  (Normal) Collected: 04/17/24 1348    Lab Status: Final result Specimen: Swab from Nasopharynx Updated: 04/17/24 1443    Narrative:      The following orders were created for panel order COVID PRE-OP / PRE-PROCEDURE SCREENING ORDER (NO ISOLATION) - Swab, Nasopharynx.  Procedure                               Abnormality         Status                     ---------                               -----------         ------                     COVID-19, FLU A/B, RSV P...[[id number]]  Normal              Final result                 Please view results for these tests on the individual orders.    COVID-19, FLU A/B, RSV PCR 1 HR TAT - Swab, Nasopharynx [[id number]]  (Normal) Collected: 04/17/24 1348    Lab Status: Final result Specimen: Swab from Nasopharynx Updated: 04/17/24 1443     COVID19 Not Detected     Influenza A PCR Not Detected     Influenza B PCR Not Detected     RSV, PCR Not Detected    Narrative:      Fact sheet for providers: https://www.fda.gov/media/893341/download    Fact sheet for patients:  https://www.fda.gov/media/729722/download    Test performed by [facility].            CT Head Without Contrast    Result Date: 4/17/2024  CT HEAD WO CONTRAST Date of Exam: 4/17/2024 2:47 PM EDT Indication: near syncope. Comparison: None available. Technique: Axial CT images were obtained of the head without contrast administration.  Automated exposure control and iterative construction methods were used. Findings: There is no evidence of hemorrhage. There is no mass effect or midline shift. Mild diffuse brain atrophy with periventricular hypodensities compatible with chronic small vessel ischemia There is no extracerebral collection. Ventricles are normal in size and configuration for patient's stated age. Posterior fossa is within normal limits. Calvarium and skull base appear intact.  Visualized sinuses show no air fluid levels. Visualized orbits are unremarkable.     Impression: Impression: Electronically Signed: Vaughn Kelley MD  4/17/2024 3:24 PM EDT  Workstation ID: [id number]    CT Angiogram Chest    Result Date: 4/17/2024  CT ANGIOGRAM CHEST, CT ABDOMEN PELVIS W CONTRAST Date of Exam: 4/17/2024 2:47 PM EDT Indication: pe. Comparison: None available. Technique: CTA of the chest was performed after the uneventful intravenous administration of 90 mL Isovue-370. Reconstructed coronal and sagittal images were also obtained. In addition, a 3-D volume rendered image was created for interpretation. Automated exposure control and iterative reconstruction methods were used. Findings: CT chest: Pulmonary arteries: Adequate opacification of the pulmonary arteries. No evidence of acute pulmonary embolism. Lungs and Pleura: The lungs are clear. No nodule. No consolidation. No pleural fluid. Mediastinum/Felipa: No mediastinal or hilar lymphadenopathy. Lymph nodes: No axillary or supraclavicular adenopathy. Cardiovascular: The heart appears within normal limits in size with surgical changes. The pericardium is normal. The aorta and  its arch branch vessels are unremarkable.  Bones and Soft Tissue: No suspicious osseous lesion. CT abdomen/pelvis: There is a small hiatal hernia Liver: Biliary/Gallbladder: Cholecystectomy changes Spleen:Spleen is normal in size and CT density. Pancreas: Pancreas shows homogeneous density. There is no evidence of pancreatic mass or peripancreatic fluid. Kidneys: Kidneys are normal in size. There are no stones or hydronephrosis. There is a small simple cortical right renal cyst Adrenals: Adrenal glands are unremarkable. Retroperitoneal/Lymph Nodes/Vasculature: No retroperitoneal adenopathy is identified by size criteria. Gastrointestinal/Mesentery: The bowel loops are non-dilated. There is mild diffuse thickening of the colon wall. The colon is decompressed limiting the assessment. The appendix appears within normal limits. No evidence of obstruction. No free air. Bladder: The bladder is unremarkable No acute abnormalities in the pelvis   Bony Structures:  Visualized bony structures are consistent with the patient's age.     Impression: Impression: 1. No evidence of pulmonary embolism 2. No acute pulmonary process 3. Small hiatal hernia and hepatic steatosis 4. Mild diffuse colonic wall thickening suspicious for colitis. Clinical correlation recommended Electronically Signed: Vaughn Kelley MD  4/17/2024 3:19 PM EDT  Workstation ID: [id number]    CT Abdomen Pelvis With Contrast    Result Date: 4/17/2024  CT ANGIOGRAM CHEST, CT ABDOMEN PELVIS W CONTRAST Date of Exam: 4/17/2024 2:47 PM EDT Indication: pe. Comparison: None available. Technique: CTA of the chest was performed after the uneventful intravenous administration of 90 mL Isovue-370. Reconstructed coronal and sagittal images were also obtained. In addition, a 3-D volume rendered image was created for interpretation. Automated exposure control and iterative reconstruction methods were used. Findings: CT chest: Pulmonary arteries: Adequate opacification of the  pulmonary arteries. No evidence of acute pulmonary embolism. Lungs and Pleura: The lungs are clear. No nodule. No consolidation. No pleural fluid. Mediastinum/Felipa: No mediastinal or hilar lymphadenopathy. Lymph nodes: No axillary or supraclavicular adenopathy. Cardiovascular: The heart appears within normal limits in size with surgical changes. The pericardium is normal. The aorta and its arch branch vessels are unremarkable.  Bones and Soft Tissue: No suspicious osseous lesion. CT abdomen/pelvis: There is a small hiatal hernia Liver: Biliary/Gallbladder: Cholecystectomy changes Spleen:Spleen is normal in size and CT density. Pancreas: Pancreas shows homogeneous density. There is no evidence of pancreatic mass or peripancreatic fluid. Kidneys: Kidneys are normal in size. There are no stones or hydronephrosis. There is a small simple cortical right renal cyst Adrenals: Adrenal glands are unremarkable. Retroperitoneal/Lymph Nodes/Vasculature: No retroperitoneal adenopathy is identified by size criteria. Gastrointestinal/Mesentery: The bowel loops are non-dilated. There is mild diffuse thickening of the colon wall. The colon is decompressed limiting the assessment. The appendix appears within normal limits. No evidence of obstruction. No free air. Bladder: The bladder is unremarkable No acute abnormalities in the pelvis   Bony Structures:  Visualized bony structures are consistent with the patient's age.     Impression: Impression: 1. No evidence of pulmonary embolism 2. No acute pulmonary process 3. Small hiatal hernia and hepatic steatosis 4. Mild diffuse colonic wall thickening suspicious for colitis. Clinical correlation recommended Electronically Signed: Vaughn Kelley MD  4/17/2024 3:19 PM EDT  Workstation ID: [id number]    XR Chest 1 View    Result Date: 4/17/2024  XR CHEST 1 VW Date of Exam: 4/17/2024 1:57 PM EDT Indication: SOA triage protocol Comparison: Chest radiograph 2/15/2020. Findings: Sternotomy and  CABG. Similar findings of benign healed/calcified granulomatous disease. Cardiomediastinal silhouette is unchanged. No focal consolidation or overt pulmonary edema. No pleural effusion or pneumothorax. Osseous structures are unchanged.     Impression: Impression: No evidence of acute cardiopulmonary disease. Electronically Signed: Bernardino Lay MD  4/17/2024 1:59 PM EDT  Workstation ID: [id number]     Results for orders placed during the hospital encounter of 09/29/20    Adult Transthoracic Echo Complete W/ Cont if Necessary Per Protocol    Interpretation Summary  · The left atrial cavity is dilated.  · Mild aortic valve regurgitation is present.  · Moderate mitral valve regurgitation is present.  · Moderate mitral valve stenosis is present.  · Estimated right ventricular systolic pressure from tricuspid regurgitation is moderately elevated (45-55 mmHg).  · Estimated left ventricular EF = 45% Left ventricular systolic function is low normal.      Assessment & Plan   Assessment & Plan       Unstable angina    HTN (hypertension)    Hyperlipidemia    Nausea & vomiting    Alicia Young is a 78 y.o. female with h/o systolic CHF/EF 45%, VHD, CAD s/p CABG in 2011 with abrupt onset of n/v today at 11:30AM while eating breakfast with her friend.  Symptoms now resolved.  Denies any current CP but has been having CP with exertion    N/v  Elevated Troponin  CP with exertion  CAD/CABG 2011  --troponin minimally elevated.  Will continue to trend  --ER contacted Dr. Hardin who recs asa only for now.  Patient with statin allergy.  --continue home betablocker  --consult cardiology in AM for ?stress test for LHC pending troponin trend overnight.  Follows with Dr. Hardin  --note CT showed mild diffuse colonic wall thickening suspicious for colitis so n/v could completely unrelated to her heart history but since better will not order further GI w/u right now    Dehydration  --hold spironolactone.  --Mild IVF overnight but monitor  closely with h/o CHF    Chronic systolic CHF  VHD  --EF 45 % 2020, mild AR, moderate MR, moderate MS, RVSP 45-55mmHg from TR  --repeat echo    Hypothyroid  --continue levothyroxine        DVT prophylaxis:  Medical DVT prophylaxis orders are signed and held.            CODE STATUS:    Code Status and Medical Interventions:   Ordered at: 04/17/24 1830     Level Of Support Discussed With:    Patient     Code Status (Patient has no pulse and is not breathing):    CPR (Attempt to Resuscitate)     Medical Interventions (Patient has pulse or is breathing):    Full Support       Expected Discharge   Expected discharge date/ time has not been documented.     Mark Pompa MD  04/17/24

## 2024-04-17 NOTE — ED PROVIDER NOTES
Subjective   History of Present Illness  Pleasant patient presents the ER for nausea vomiting difficulty breathing.  She also feels like she will pass out.  Onset within the last 2 hours.  She did have breakfast with a friend and overall is feeling okay all this started fairly suddenly.  Treated for CABG.  Stents.  CHF.  Looks like she has seen Dr. Hardin in the past.  She is accompanied by family at the bedside.        Review of Systems    Past Medical History:   Diagnosis Date    Arthritis     CHF (congestive heart failure)     Depression     GERD (gastroesophageal reflux disease)     History of heart attack     Hyperlipidemia     Hypertension     Macular degeneration     Wears glasses        Allergies   Allergen Reactions    Lipitor [Atorvastatin] Myalgia    Ezetimibe Myalgia    Statins Myalgia       Past Surgical History:   Procedure Laterality Date    CARDIAC CATHETERIZATION      CARDIAC CATHETERIZATION N/A 2019    Procedure: Left Heart Cath (00779);  Surgeon: Ronny Hardin MD;  Location:  [facility];  Service: Cardiovascular    CHOLECYSTECTOMY      COLONOSCOPY      CORONARY ARTERY BYPASS GRAFT      TOTAL KNEE ARTHROPLASTY Left 2020    Procedure: TOTAL KNEE ARTHROPLASTY LEFT;  Surgeon: Heladio Arroyo MD;  Location: [facility] OR;  Service: Orthopedics;  Laterality: Left;       No family history on file.    Social History     Socioeconomic History    Marital status:    Tobacco Use    Smoking status: Former     Current packs/day: 0.00     Types: Cigarettes     Quit date: 1988     Years since quittin.3     Passive exposure: Past    Smokeless tobacco: Never   Substance and Sexual Activity    Alcohol use: No    Drug use: No    Sexual activity: Defer           Objective   Physical Exam  Constitutional:       Appearance: She is well-developed. She is ill-appearing.   HENT:      Head: Normocephalic and atraumatic.      Right Ear: External ear normal.      Left Ear:  External ear normal.      Nose: Nose normal.   Eyes:      Conjunctiva/sclera: Conjunctivae normal.      Pupils: Pupils are equal, round, and reactive to light.   Cardiovascular:      Rate and Rhythm: Normal rate and regular rhythm.      Heart sounds: Normal heart sounds.   Pulmonary:      Effort: Pulmonary effort is normal.      Breath sounds: Normal breath sounds.   Abdominal:      General: Bowel sounds are normal.      Palpations: Abdomen is soft.   Musculoskeletal:         General: Normal range of motion.      Cervical back: Normal range of motion and neck supple.   Skin:     General: Skin is warm and dry.   Neurological:      Mental Status: She is alert and oriented to person, place, and time.   Psychiatric:         Behavior: Behavior normal.         Thought Content: Thought content normal.         Judgment: Judgment normal.         Procedures           ED Course  ED Course as of 04/17/24 1736   Wed Apr 17, 2024   1325 RN at  with EKG [JM]   1326 Case dw Dr. Garibay. [JM]   1330 Will repeat ekg [JM]   1331 Differential including CVA abdominal illness along with CAD.  Will need to get scans of her chest abdomen pelvis along with her head as well. [JM]   1623 Patient appears much better resting comfortably.  Getting second troponin EKG.  Strong possibility of admission for further evaluation. [JM]   1731 I spoke with Dr. Hardin and ok with admitting to the hospitalist. Ok with just asa at this point. [JM]      ED Course User Index  [JM] Bony Patterson, LOLA                HEART Score: 7                    CT Head Without Contrast   Final Result   Impression:               Electronically Signed: Vaughn Kelley MD     4/17/2024 3:24 PM EDT     Workstation ID: [id number]      CT Angiogram Chest   Final Result   Impression:      1. No evidence of pulmonary embolism      2. No acute pulmonary process      3. Small hiatal hernia and hepatic steatosis      4. Mild diffuse colonic wall thickening suspicious for colitis.  Clinical correlation recommended         Electronically Signed: Vaughn Kelley MD     4/17/2024 3:19 PM EDT     Workstation ID: [id number]      CT Abdomen Pelvis With Contrast   Final Result   Impression:      1. No evidence of pulmonary embolism      2. No acute pulmonary process      3. Small hiatal hernia and hepatic steatosis      4. Mild diffuse colonic wall thickening suspicious for colitis. Clinical correlation recommended         Electronically Signed: Vaughn Kelley MD     4/17/2024 3:19 PM EDT     Workstation ID: [id number]      XR Chest 1 View   Final Result   Impression:   No evidence of acute cardiopulmonary disease.          Electronically Signed: Bernardino Lay MD     4/17/2024 1:59 PM EDT     Workstation ID: [id number]                  Medical Decision Making  Amount and/or Complexity of Data Reviewed  Labs: ordered.  Radiology: ordered.  ECG/medicine tests: ordered.    Risk  OTC drugs.  Prescription drug management.        Final diagnoses:   NSTEMI (non-ST elevated myocardial infarction)   Nausea and vomiting, unspecified vomiting type       ED Disposition  ED Disposition       ED Disposition   Decision to Admit    Condition   --    Comment   --               No follow-up provider specified.       Medication List      No changes were made to your prescriptions during this visit.            Bony Patterson, APRN  04/17/24 4252

## 2024-04-17 NOTE — ED NOTES
" Alicia Young    Nursing Report ED to Floor:  Mental status: A&Ox4  Ambulatory status: Up with walker  Oxygen Therapy:  RA  Cardiac Rhythm: NS  Admitted from: Home  Safety Concerns:  None  Social Issues: None  ED Room #:  06    ED Nurse Phone Extension - [phone]   or may call [phone].      HPI:   Chief Complaint   Patient presents with    Shortness of Breath       Past Medical History:  Past Medical History:   Diagnosis Date    Arthritis     CHF (congestive heart failure)     Depression     GERD (gastroesophageal reflux disease)     History of heart attack     Hyperlipidemia     Hypertension     Macular degeneration     Nausea & vomiting 4/17/2024    Wears glasses         Past Surgical History:  Past Surgical History:   Procedure Laterality Date    CARDIAC CATHETERIZATION      CARDIAC CATHETERIZATION N/A 9/16/2019    Procedure: Left Heart Cath (31028);  Surgeon: Ronny Hardin MD;  Location:  [facility];  Service: Cardiovascular    CHOLECYSTECTOMY      COLONOSCOPY      CORONARY ARTERY BYPASS GRAFT      TOTAL KNEE ARTHROPLASTY Left 9/29/2020    Procedure: TOTAL KNEE ARTHROPLASTY LEFT;  Surgeon: Heladio Arroyo MD;  Location:  [facility];  Service: Orthopedics;  Laterality: Left;        Admitting Doctor:   Mark Pompa MD    Consulting Provider(s):  Consults       No orders found from 3/19/2024 to 4/18/2024.             Admitting Diagnosis:   The primary encounter diagnosis was NSTEMI (non-ST elevated myocardial infarction). A diagnosis of Nausea and vomiting, unspecified vomiting type was also pertinent to this visit.    Most Recent Vitals:   Vitals:    04/17/24 1821 04/17/24 1830 04/17/24 1900 04/17/24 1920   BP:  158/67 132/88    Pulse:  64 62    Resp:       Temp: 97.7 °F (36.5 °C)      TempSrc: Oral      SpO2:  99% 94%    Weight:    73 kg (160 lb 15 oz)   Height: 157.5 cm (62\")          Active LDAs/IV Access:   Lines, Drains & Airways       Active LDAs       Name Placement date Placement " time Site Days    Peripheral IV 04/17/24 1305 Anterior;Left Hand 04/17/24  1305  Hand  less than 1    Peripheral IV 04/17/24 1357 Anterior;Right Forearm 04/17/24  1357  Forearm  less than 1                    Labs (abnormal labs have a star):   Labs Reviewed   COMPREHENSIVE METABOLIC PANEL - Abnormal; Notable for the following components:       Result Value    Glucose 194 (*)     CO2 20.0 (*)     AST (SGOT) 34 (*)     Anion Gap 18.0 (*)     All other components within normal limits    Narrative:     GFR Normal >60  Chronic Kidney Disease <60  Kidney Failure <15    The GFR formula is only valid for adults with stable renal function between ages 18 and 70.   SINGLE HS TROPONIN T - Abnormal; Notable for the following components:    HS Troponin T 17 (*)     All other components within normal limits    Narrative:     High Sensitive Troponin T Reference Range:  <14.0 ng/L- Negative Female for AMI  <22.0 ng/L- Negative Male for AMI  >=14 - Abnormal Female indicating possible myocardial injury.  >=22 - Abnormal Male indicating possible myocardial injury.   Clinicians would have to utilize clinical acumen, EKG, Troponin, and serial changes to determine if it is an Acute Myocardial Infarction or myocardial injury due to an underlying chronic condition.        CBC WITH AUTO DIFFERENTIAL - Abnormal; Notable for the following components:    WBC 12.38 (*)     Neutrophil % 80.4 (*)     Lymphocyte % 11.3 (*)     Immature Grans % 0.8 (*)     Neutrophils, Absolute 9.94 (*)     Immature Grans, Absolute 0.10 (*)     All other components within normal limits   URINALYSIS W/ MICROSCOPIC IF INDICATED (NO CULTURE) - Abnormal; Notable for the following components:    pH, UA 8.5 (*)     Ketones, UA 15 mg/dL (1+) (*)     All other components within normal limits    Narrative:     Urine microscopic not indicated.   SINGLE HS TROPONIN T - Abnormal; Notable for the following components:    HS Troponin T 23 (*)     All other components within  normal limits    Narrative:     High Sensitive Troponin T Reference Range:  <14.0 ng/L- Negative Female for AMI  <22.0 ng/L- Negative Male for AMI  >=14 - Abnormal Female indicating possible myocardial injury.  >=22 - Abnormal Male indicating possible myocardial injury.   Clinicians would have to utilize clinical acumen, EKG, Troponin, and serial changes to determine if it is an Acute Myocardial Infarction or myocardial injury due to an underlying chronic condition.        COVID-19/FLUA&B/RSV, NP SWAB IN TRANSPORT MEDIA 1 HR TAT - Normal    Narrative:     Fact sheet for providers: https://www.fda.gov/media/673605/download    Fact sheet for patients: https://www.fda.gov/media/704685/download    Test performed by PCR.   BNP (IN-HOUSE) - Normal    Narrative:     This assay is used as an aid in the diagnosis of individuals suspected of having heart failure. It can be used as an aid in the diagnosis of acute decompensated heart failure (ADHF) in patients presenting with signs and symptoms of ADHF to the emergency department (ED). In addition, NT-proBNP of <300 pg/mL indicates ADHF is not likely.    Age Range Result Interpretation  NT-proBNP Concentration (pg/mL:      <50             Positive            >450                   Gray                 300-450                    Negative             <300    50-75           Positive            >900                  Gray                300-900                  Negative            <300      >75             Positive            >1800                  Gray                300-1800                  Negative            <300   LIPASE - Normal   POCT CREATININE - Normal   COVID PRE-OP / PRE-PROCEDURE SCREENING ORDER (NO ISOLATION)    Narrative:     The following orders were created for panel order COVID PRE-OP / PRE-PROCEDURE SCREENING ORDER (NO ISOLATION) - Swab, Nasopharynx.  Procedure                               Abnormality         Status                     ---------                                -----------         ------                     COVID-19, FLU A/B, RSV P...[[id number]]  Normal              Final result                 Please view results for these tests on the individual orders.   RAINBOW DRAW    Narrative:     The following orders were created for panel order [place] Draw.  Procedure                               Abnormality         Status                     ---------                               -----------         ------                     Green Top (Gel)[[id number]]                                  Final result               Lavender Top[[id number]]                                     Final result               Gold Top - SST[[id number]]                                   Final result               Ornelas Top[[id number]]                                         Final result               Light Blue Top[[id number]]                                   Final result                 Please view results for these tests on the individual orders.   HEMOGLOBIN A1C   TROPONIN   POCT CREATININE   CBC AND DIFFERENTIAL    Narrative:     The following orders were created for panel order CBC & Differential.  Procedure                               Abnormality         Status                     ---------                               -----------         ------                     CBC Auto Differential[[id number]]        Abnormal            Final result                 Please view results for these tests on the individual orders.   GREEN TOP   LAVENDER TOP   GOLD TOP - SST   GRAY TOP   LIGHT BLUE TOP       Meds Given in ED:   Medications   sodium chloride 0.9 % flush 10 mL (has no administration in time range)   aspirin chewable tablet 324 mg (0 mg Oral Hold 4/17/24 1921)   sodium chloride 0.9 % with KCl 20 mEq/L infusion (has no administration in time range)   sodium chloride 0.9 % bolus 1,000 mL (0 mL Intravenous Stopped 4/17/24 1410)   ondansetron (ZOFRAN) injection 4 mg (4 mg Intravenous Given  4/17/24 1340)   iopamidol (ISOVUE-370) 76 % injection 100 mL (90 mL Intravenous Given 4/17/24 1448)     sodium chloride 0.9 % with KCl 20 mEq, 75 mL/hr         Last NIH score:                                                          Dysphagia screening results:        Debby Coma Scale:  No data recorded     CIWA:        Restraint Type:            Isolation Status:  No active isolations

## 2024-04-18 ENCOUNTER — APPOINTMENT (OUTPATIENT)
Dept: CARDIOLOGY | Facility: HOSPITAL | Age: 79
End: 2024-04-18
Payer: MEDICARE

## 2024-04-18 VITALS
SYSTOLIC BLOOD PRESSURE: 161 MMHG | WEIGHT: 157 LBS | DIASTOLIC BLOOD PRESSURE: 65 MMHG | OXYGEN SATURATION: 98 % | HEIGHT: 62 IN | BODY MASS INDEX: 28.89 KG/M2 | RESPIRATION RATE: 18 BRPM | HEART RATE: 60 BPM | TEMPERATURE: 97.9 F

## 2024-04-18 PROBLEM — R11.2 NAUSEA & VOMITING: Status: RESOLVED | Noted: 2024-04-17 | Resolved: 2024-04-18

## 2024-04-18 PROBLEM — R07.89 CHEST PAIN, ATYPICAL: Status: RESOLVED | Noted: 2020-10-01 | Resolved: 2024-04-18

## 2024-04-18 PROBLEM — D72.829 LEUKOCYTOSIS: Status: RESOLVED | Noted: 2020-09-30 | Resolved: 2024-04-18

## 2024-04-18 PROBLEM — E83.42 HYPOMAGNESEMIA: Status: RESOLVED | Noted: 2020-10-01 | Resolved: 2024-04-18

## 2024-04-18 PROBLEM — G89.18 ACUTE POSTOPERATIVE PAIN: Status: RESOLVED | Noted: 2020-09-30 | Resolved: 2024-04-18

## 2024-04-18 PROBLEM — I20.0 UNSTABLE ANGINA: Status: RESOLVED | Noted: 2024-04-17 | Resolved: 2024-04-18

## 2024-04-18 PROBLEM — E83.39 HYPOPHOSPHATEMIA: Status: RESOLVED | Noted: 2020-10-01 | Resolved: 2024-04-18

## 2024-04-18 LAB
ANION GAP SERPL CALCULATED.3IONS-SCNC: 7 MMOL/L (ref 5–15)
BH CV ECHO MEAS - AI P1/2T: 399.7 MSEC
BH CV ECHO MEAS - AO MAX PG: 13.8 MMHG
BH CV ECHO MEAS - AO MEAN PG: 7.3 MMHG
BH CV ECHO MEAS - AO ROOT DIAM: 3.1 CM
BH CV ECHO MEAS - AO V2 MAX: 184.7 CM/SEC
BH CV ECHO MEAS - AO V2 VTI: 44.3 CM
BH CV ECHO MEAS - AVA(I,D): 1.3 CM2
BH CV ECHO MEAS - EDV(CUBED): 136.6 ML
BH CV ECHO MEAS - EDV(MOD-SP2): 174 ML
BH CV ECHO MEAS - EDV(MOD-SP4): 184 ML
BH CV ECHO MEAS - EF(MOD-BP): 49.4 %
BH CV ECHO MEAS - EF(MOD-SP2): 50 %
BH CV ECHO MEAS - EF(MOD-SP4): 47.6 %
BH CV ECHO MEAS - ESV(CUBED): 64 ML
BH CV ECHO MEAS - ESV(MOD-SP2): 87 ML
BH CV ECHO MEAS - ESV(MOD-SP4): 96.4 ML
BH CV ECHO MEAS - FS: 22.3 %
BH CV ECHO MEAS - IVS/LVPW: 1.59 CM
BH CV ECHO MEAS - IVSD: 1.35 CM
BH CV ECHO MEAS - LA DIMENSION: 4.7 CM
BH CV ECHO MEAS - LAT PEAK E' VEL: 6 CM/SEC
BH CV ECHO MEAS - LV MASS(C)D: 217.3 GRAMS
BH CV ECHO MEAS - LV MAX PG: 3 MMHG
BH CV ECHO MEAS - LV MEAN PG: 2 MMHG
BH CV ECHO MEAS - LV V1 MAX: 86.2 CM/SEC
BH CV ECHO MEAS - LV V1 VTI: 20.3 CM
BH CV ECHO MEAS - LVIDD: 5.2 CM
BH CV ECHO MEAS - LVIDS: 4 CM
BH CV ECHO MEAS - LVOT AREA: 2.8 CM2
BH CV ECHO MEAS - LVOT DIAM: 1.9 CM
BH CV ECHO MEAS - LVPWD: 0.85 CM
BH CV ECHO MEAS - MED PEAK E' VEL: 4.5 CM/SEC
BH CV ECHO MEAS - MR MAX PG: 135 MMHG
BH CV ECHO MEAS - MR MAX VEL: 581 CM/SEC
BH CV ECHO MEAS - MR MEAN PG: 93 MMHG
BH CV ECHO MEAS - MR MEAN VEL: 460 CM/SEC
BH CV ECHO MEAS - MR VTI: 223 CM
BH CV ECHO MEAS - MV A MAX VEL: 127.5 CM/SEC
BH CV ECHO MEAS - MV DEC TIME: 0.28 SEC
BH CV ECHO MEAS - MV E MAX VEL: 187.5 CM/SEC
BH CV ECHO MEAS - MV E/A: 1.47
BH CV ECHO MEAS - MV MAX PG: 11.5 MMHG
BH CV ECHO MEAS - MV MEAN PG: 5 MMHG
BH CV ECHO MEAS - MV V2 VTI: 54.2 CM
BH CV ECHO MEAS - MVA(VTI): 1.06 CM2
BH CV ECHO MEAS - PA ACC TIME: 0.1 SEC
BH CV ECHO MEAS - PA V2 MAX: 110 CM/SEC
BH CV ECHO MEAS - PAPD(PI EDV): 6 MMHG
BH CV ECHO MEAS - PI END-D VEL: 127.5 CM/SEC
BH CV ECHO MEAS - RAP SYSTOLE: 8 MMHG
BH CV ECHO MEAS - RVSP: 43 MMHG
BH CV ECHO MEAS - SV(LVOT): 57.4 ML
BH CV ECHO MEAS - SV(MOD-SP2): 87 ML
BH CV ECHO MEAS - SV(MOD-SP4): 87.6 ML
BH CV ECHO MEAS - TAPSE (>1.6): 1.89 CM
BH CV ECHO MEAS - TR MAX PG: 35.5 MMHG
BH CV ECHO MEAS - TR MAX VEL: 297.6 CM/SEC
BH CV ECHO MEASUREMENTS AVERAGE E/E' RATIO: 35.71
BH CV REST NUCLEAR ISOTOPE DOSE: 30 MCI
BH CV STRESS BP STAGE 1: NORMAL
BH CV STRESS BP STAGE 3: NORMAL
BH CV STRESS COMMENTS STAGE 1: NORMAL
BH CV STRESS DOSE REGADENOSON STAGE 1: 0.4
BH CV STRESS DURATION MIN STAGE 1: 1
BH CV STRESS DURATION MIN STAGE 2: 1
BH CV STRESS DURATION MIN STAGE 3: 1
BH CV STRESS DURATION MIN STAGE 4: 1
BH CV STRESS DURATION SEC STAGE 1: 0
BH CV STRESS DURATION SEC STAGE 2: 0
BH CV STRESS DURATION SEC STAGE 3: 0
BH CV STRESS DURATION SEC STAGE 4: 0
BH CV STRESS HR STAGE 1: 68
BH CV STRESS HR STAGE 2: 82
BH CV STRESS HR STAGE 3: 77
BH CV STRESS HR STAGE 4: 78
BH CV STRESS NUCLEAR ISOTOPE DOSE: 30 MCI
BH CV STRESS O2 STAGE 1: 96
BH CV STRESS O2 STAGE 2: 97
BH CV STRESS O2 STAGE 3: 98
BH CV STRESS O2 STAGE 4: 97
BH CV STRESS PROTOCOL 1: NORMAL
BH CV STRESS RECOVERY BP: NORMAL MMHG
BH CV STRESS RECOVERY HR: 75 BPM
BH CV STRESS RECOVERY O2: 95 %
BH CV STRESS STAGE 1: 1
BH CV STRESS STAGE 2: 2
BH CV STRESS STAGE 3: 3
BH CV STRESS STAGE 4: 4
BH CV VAS BP RIGHT ARM: NORMAL MMHG
BH CV XLRA - RV BASE: 3.6 CM
BH CV XLRA - RV LENGTH: 6.4 CM
BH CV XLRA - RV MID: 2.5 CM
BH CV XLRA - TDI S': 9.7 CM/SEC
BUN SERPL-MCNC: 6 MG/DL (ref 8–23)
BUN/CREAT SERPL: 8.5 (ref 7–25)
CA-I SERPL ISE-MCNC: 1.31 MMOL/L (ref 1.12–1.32)
CALCIUM SPEC-SCNC: 8.4 MG/DL (ref 8.6–10.5)
CHLORIDE SERPL-SCNC: 110 MMOL/L (ref 98–107)
CHOLEST SERPL-MCNC: 150 MG/DL (ref 0–200)
CO2 SERPL-SCNC: 25 MMOL/L (ref 22–29)
CREAT SERPL-MCNC: 0.71 MG/DL (ref 0.57–1)
DEPRECATED RDW RBC AUTO: 45.3 FL (ref 37–54)
EGFRCR SERPLBLD CKD-EPI 2021: 87.2 ML/MIN/1.73
ERYTHROCYTE [DISTWIDTH] IN BLOOD BY AUTOMATED COUNT: 12.8 % (ref 12.3–15.4)
GEN 5 2HR TROPONIN T REFLEX: 27 NG/L
GLUCOSE SERPL-MCNC: 92 MG/DL (ref 65–99)
HCT VFR BLD AUTO: 38.9 % (ref 34–46.6)
HDLC SERPL-MCNC: 56 MG/DL (ref 40–60)
HGB BLD-MCNC: 13.1 G/DL (ref 12–15.9)
IVRT: 62 MS
LDLC SERPL CALC-MCNC: 71 MG/DL (ref 0–100)
LDLC/HDLC SERPL: 1.2 {RATIO}
LEFT ATRIUM VOLUME INDEX: 48 ML/M2
LV EF 2D ECHO EST: 49 %
MAGNESIUM SERPL-MCNC: 1.9 MG/DL (ref 1.6–2.4)
MAXIMAL PREDICTED HEART RATE: 142 BPM
MCH RBC QN AUTO: 32.8 PG (ref 26.6–33)
MCHC RBC AUTO-ENTMCNC: 33.7 G/DL (ref 31.5–35.7)
MCV RBC AUTO: 97.5 FL (ref 79–97)
PERCENT MAX PREDICTED HR: 58.45 %
PHOSPHATE SERPL-MCNC: 3.5 MG/DL (ref 2.5–4.5)
PLATELET # BLD AUTO: 174 10*3/MM3 (ref 140–450)
PMV BLD AUTO: 10.6 FL (ref 6–12)
POTASSIUM SERPL-SCNC: 4.1 MMOL/L (ref 3.5–5.2)
QT INTERVAL: 440 MS
QT INTERVAL: 484 MS
QTC INTERVAL: 475 MS
QTC INTERVAL: 495 MS
RBC # BLD AUTO: 3.99 10*6/MM3 (ref 3.77–5.28)
SODIUM SERPL-SCNC: 142 MMOL/L (ref 136–145)
STRESS BASELINE BP: NORMAL MMHG
STRESS BASELINE HR: 69 BPM
STRESS O2 SAT REST: 96 %
STRESS PERCENT HR: 69 %
STRESS POST ESTIMATED WORKLOAD: 1 METS
STRESS POST EXERCISE DUR MIN: 4 MIN
STRESS POST EXERCISE DUR SEC: 0 SEC
STRESS POST O2 SAT PEAK: 98 %
STRESS POST PEAK BP: NORMAL MMHG
STRESS POST PEAK HR: 83 BPM
STRESS TARGET HR: 121 BPM
TRIGL SERPL-MCNC: 134 MG/DL (ref 0–150)
TROPONIN T DELTA: 2 NG/L
VLDLC SERPL-MCNC: 23 MG/DL (ref 5–40)
WBC NRBC COR # BLD AUTO: 5.19 10*3/MM3 (ref 3.4–10.8)

## 2024-04-18 PROCEDURE — G0378 HOSPITAL OBSERVATION PER HR: HCPCS

## 2024-04-18 PROCEDURE — A9555 RB82 RUBIDIUM: HCPCS | Performed by: PEDIATRICS

## 2024-04-18 PROCEDURE — 80061 LIPID PANEL: CPT | Performed by: INTERNAL MEDICINE

## 2024-04-18 PROCEDURE — 84100 ASSAY OF PHOSPHORUS: CPT | Performed by: INTERNAL MEDICINE

## 2024-04-18 PROCEDURE — 96375 TX/PRO/DX INJ NEW DRUG ADDON: CPT

## 2024-04-18 PROCEDURE — 0 RUBIDIUM CHLORIDE: Performed by: PEDIATRICS

## 2024-04-18 PROCEDURE — 93005 ELECTROCARDIOGRAM TRACING: CPT | Performed by: INTERNAL MEDICINE

## 2024-04-18 PROCEDURE — 83735 ASSAY OF MAGNESIUM: CPT | Performed by: INTERNAL MEDICINE

## 2024-04-18 PROCEDURE — 25010000002 SULFUR HEXAFLUORIDE MICROSPH 60.7-25 MG RECONSTITUTED SUSPENSION: Performed by: PHYSICIAN ASSISTANT

## 2024-04-18 PROCEDURE — 82330 ASSAY OF CALCIUM: CPT | Performed by: INTERNAL MEDICINE

## 2024-04-18 PROCEDURE — 25010000002 REGADENOSON 0.4 MG/5ML SOLUTION: Performed by: PEDIATRICS

## 2024-04-18 PROCEDURE — 93010 ELECTROCARDIOGRAM REPORT: CPT | Performed by: INTERNAL MEDICINE

## 2024-04-18 PROCEDURE — 25010000002 SODIUM CHLORIDE 0.9 % WITH KCL 20 MEQ 20-0.9 MEQ/L-% SOLUTION: Performed by: INTERNAL MEDICINE

## 2024-04-18 PROCEDURE — 84484 ASSAY OF TROPONIN QUANT: CPT | Performed by: INTERNAL MEDICINE

## 2024-04-18 PROCEDURE — 78431 MYOCRD IMG PET RST&STRS CT: CPT

## 2024-04-18 PROCEDURE — 93017 CV STRESS TEST TRACING ONLY: CPT

## 2024-04-18 PROCEDURE — 85027 COMPLETE CBC AUTOMATED: CPT | Performed by: INTERNAL MEDICINE

## 2024-04-18 PROCEDURE — 99238 HOSP IP/OBS DSCHRG MGMT 30/<: CPT | Performed by: PEDIATRICS

## 2024-04-18 PROCEDURE — 93306 TTE W/DOPPLER COMPLETE: CPT

## 2024-04-18 PROCEDURE — 80048 BASIC METABOLIC PNL TOTAL CA: CPT | Performed by: INTERNAL MEDICINE

## 2024-04-18 RX ORDER — CAFFEINE CITRATE 20 MG/ML
60 SOLUTION INTRAVENOUS ONCE
Status: COMPLETED | OUTPATIENT
Start: 2024-04-18 | End: 2024-04-18

## 2024-04-18 RX ORDER — REGADENOSON 0.08 MG/ML
0.4 INJECTION, SOLUTION INTRAVENOUS ONCE
Status: COMPLETED | OUTPATIENT
Start: 2024-04-18 | End: 2024-04-18

## 2024-04-18 RX ORDER — PANTOPRAZOLE SODIUM 40 MG/1
40 TABLET, DELAYED RELEASE ORAL
Status: DISCONTINUED | OUTPATIENT
Start: 2024-04-19 | End: 2024-04-18 | Stop reason: HOSPADM

## 2024-04-18 RX ADMIN — CAFFEINE CITRATE 60 MG: 20 INJECTION, SOLUTION INTRAVENOUS at 08:49

## 2024-04-18 RX ADMIN — REGADENOSON 0.4 MG: 0.08 INJECTION, SOLUTION INTRAVENOUS at 08:33

## 2024-04-18 RX ADMIN — RUBIDIUM CHLORIDE RB-82 1 DOSE: 150 INJECTION, SOLUTION INTRAVENOUS at 08:23

## 2024-04-18 RX ADMIN — CARVEDILOL 3.12 MG: 3.12 TABLET, FILM COATED ORAL at 09:03

## 2024-04-18 RX ADMIN — ASPIRIN 81 MG: 81 TABLET, CHEWABLE ORAL at 09:03

## 2024-04-18 RX ADMIN — RUBIDIUM CHLORIDE RB-82 1 DOSE: 150 INJECTION, SOLUTION INTRAVENOUS at 08:34

## 2024-04-18 RX ADMIN — POTASSIUM CHLORIDE AND SODIUM CHLORIDE 75 ML/HR: 900; 150 INJECTION, SOLUTION INTRAVENOUS at 11:16

## 2024-04-18 RX ADMIN — SULFUR HEXAFLUORIDE 2 ML: KIT at 12:04

## 2024-04-18 RX ADMIN — LEVOTHYROXINE SODIUM 25 MCG: 25 TABLET ORAL at 05:28

## 2024-04-18 RX ADMIN — PANTOPRAZOLE SODIUM 40 MG: 40 INJECTION, POWDER, FOR SOLUTION INTRAVENOUS at 09:03

## 2024-04-18 RX ADMIN — FLUOXETINE 20 MG: 20 CAPSULE ORAL at 09:03

## 2024-04-18 NOTE — CASE MANAGEMENT/SOCIAL WORK
Continued Stay Note  [facility]     Patient Name: Alicia Young  MRN: [id number]  Today's Date: 4/18/2024    Admit Date: 4/17/2024    Plan: home   Discharge Plan       Row Name 04/18/24 1202       Plan    Plan home    Patient/Family in Agreement with Plan yes    Plan Comments I met with this patient regarding her discharge home today. She is in agreement with this plan. Her daughter can transport. She denies having any further discharge planning needs at this time.    Final Discharge Disposition Code 01 - home or self-care                   Discharge Codes    No documentation.                 Expected Discharge Date and Time       Expected Discharge Date Expected Discharge Time    Apr 18, 2024               Randi Woodward RN

## 2024-04-18 NOTE — PLAN OF CARE
Goal Outcome Evaluation:              Outcome Evaluation: Patient had denied pain/nausea since arrival to the floor, NPO at this time as ordered, pt voices no needs or concerns at this time, NAD noted at this time,.

## 2024-04-18 NOTE — CONSULTS
Cardiology Consult - [facility]    Alicia Young     S205/1 1945  [address]  [facility] [address]         Admission Date:  4/17/2024    Consultation Date:  04/18/24        PCP:  Shamir Morales MD  Referring MD: Dr. Pompa  Consulting MD: Dr. Zee  Primary Cardiologist: Dr. Hardin        CC: Nausea/vomiting    Reason for Consult: Chest pain and patient with known CAD          Allergies:  is allergic to lipitor [atorvastatin], ezetimibe, and statins.    Medications Prior to Admission   Medication Sig Dispense Refill Last Dose    aspirin 81 MG chewable tablet Chew 1 tablet.   4/17/2024 at 0800    carvedilol (COREG) 3.125 MG tablet Take 1 tablet by mouth 2 (Two) Times a Day. 180 tablet 3 4/17/2024 at 0800    FLUoxetine (PROzac) 20 MG capsule Take 1 capsule by mouth Daily.   4/17/2024 at 0800    levothyroxine (SYNTHROID, LEVOTHROID) 25 MCG tablet levothyroxine 25 mcg tablet   TAKE 1 TABLET BY MOUTH EVERY DAY   4/17/2024 at 0600    pantoprazole (PROTONIX) 40 MG injection 10 mL. TAKES PO   Patient Taking Differently    spironolactone (ALDACTONE) 25 MG tablet Take 1 tablet by mouth Daily. 30 tablet 11 4/17/2024 at 0800    Ascorbic Acid (VITAMIN C PO) Take  by mouth.   Unknown    B Complex-C (SUPER B COMPLEX PO) Take  by mouth.   Unknown    Evolocumab (Repatha) 140 MG/ML solution prefilled syringe Inject 1 mL under the skin into the appropriate area as directed Every 30 (Thirty) Days. Resume after follow-up with Dr. Arroyo 2.1 mL  Unknown    Multiple Vitamins-Minerals (PRESERVISION AREDS PO) Take 1 tablet by mouth Daily.   Unknown       sodium chloride 0.9 % with KCl 20 mEq, 75 mL/hr, Last Rate: 75 mL/hr (04/17/24 2112)              HPI:  Alicia Young is a 78-year-old CF with PMHx CAD/CABG 2011, [facility],, history of systolic CHF, hypothyroidism, GERD, HTN, HLP, macular degeneration, OA, depression.  She has been in her usual state of health until yesterday morning with sudden  onset nausea vomiting while eating breakfast with a friend.  She had associated diaphoresis, chills.  She went to the [facility] but was referred to the ER.  Upon arrival to ED, blood pressure and heart rate WNL, afebrile with temp 97.7.  WBC 12.3, electrolytes within normal limits.  She was admitted to hospitalist services for observation.  CT demonstrates diffuse colonic wall thickening suspicious for colitis . initial HST 17 with flat rise to 23.  Cardiology has been asked to consult with patient.    At time of consultation, BP remained stable at 146/60, HR 66 NSR.  EKG demonstrates no acute findings.  HST remains flat.  She does mention no current chest pain but has had exertional chest pain over the last several months.  Again, this is not what brought her to the ED.  She has just returned from cardiac PET test this a.m.  Denies chest pain or angina since admission.  Reports that with previous cardiac events, she has pain in shoulders and jaw.  She denies dyspnea, orthopnea, dizziness, palpitations.            Social History     Socioeconomic History    Marital status:    Tobacco Use    Smoking status: Former     Current packs/day: 0.00     Types: Cigarettes     Quit date: 1988     Years since quittin.3     Passive exposure: Past    Smokeless tobacco: Never   Vaping Use    Vaping status: Never Used   Substance and Sexual Activity    Alcohol use: No    Drug use: No    Sexual activity: Defer     History reviewed. No pertinent family history.  Past Surgical History:   Procedure Laterality Date    CARDIAC CATHETERIZATION      CARDIAC CATHETERIZATION N/A 2019    Procedure: Left Heart Cath (88695);  Surgeon: Ronny Hardin MD;  Location:  [facility] CATH INVASIVE LOCATION;  Service: Cardiovascular    CHOLECYSTECTOMY      COLONOSCOPY      CORONARY ARTERY BYPASS GRAFT      TOTAL KNEE ARTHROPLASTY Left 2020    Procedure: TOTAL KNEE ARTHROPLASTY LEFT;  Surgeon: Heladio Arroyo MD;  Location: [facility]  "OR;  Service: Orthopedics;  Laterality: Left;     ROS: Pertinent items are noted in HPI, all other systems reviewed and negative     Objective     height is 157.5 cm (62\") and weight is 71.6 kg (157 lb 12.8 oz). Her oral temperature is 97.7 °F (36.5 °C). Her blood pressure is 146/60 and her pulse is 66. Her respiration is 18 and oxygen saturation is 96%.    Intake/Output Summary (Last 24 hours) at 4/18/2024 0822  Last data filed at 4/18/2024 0308  Gross per 24 hour   Intake 1000 ml   Output 400 ml   Net 600 ml     Intake/Output                   04/17/24 0701 - 04/18/24 0700     [id number] Total              Intake    IV Piggyback  1000  -- 1000    Total Intake 1000 -- 1000       Output    Urine  --  400 400    Total Output -- 400 400               04/17/24  1920 04/17/24 2031   Weight: 73 kg (160 lb 15 oz) 71.6 kg (157 lb 12.8 oz)          Physical Exam:  General Appearance:    Alert, cooperative, in no acute distress   Head:    Normocephalic, without obvious abnormality, atraumatic   Eyes:            Lids and lashes normal, conjunctivae and sclerae normal, no   icterus, no pallor, corneas clear, PERRLA   Ears:    Ears appear intact with no abnormalities noted   Throat:   No oral lesions, no thrush, oral mucosa moist   Neck:   No adenopathy, supple, trachea midline, no thyromegaly, no carotid bruit, no JVD   Back:     No kyphosis present, no scoliosis present, no skin lesions,    erythema or scars, no tenderness to percussion or                   palpation, range of motion normal   Lungs:     Clear to auscultation,respirations regular, even and               unlabored    Heart:    Regular rhythm and normal rate, normal S1 and S2, II/VI      murmur, no gallop, no rub, no click   Abdomen:     Normal bowel sounds, no masses, no organomegaly, soft     nontender, nondistended, no guarding, no rebound   tenderness   Extremities:   Moves all extremities well,  no cyanosis, no redness, no edema   Pulses:   Pulses " palpable and equal bilaterally   Skin:   No bleeding, bruising or rash   Lymph nodes:   No palpable adenopathy   Neurologic:   Cranial nerves 2 - 12 grossly intact, sensation intact, DTR     present and equal bilaterally          Results Review:  I personally reviewed the patient's clinical results.  Results from last 7 days   Lab Units 04/18/24  0714   WBC 10*3/mm3 5.19   HEMOGLOBIN g/dL 13.1   HEMATOCRIT % 38.9   PLATELETS 10*3/mm3 174     Results from last 7 days   Lab Units 04/18/24  0714 04/17/24  1354 04/17/24  1333   SODIUM mmol/L 142  --  136   POTASSIUM mmol/L 4.1  --  3.8   CHLORIDE mmol/L 110*  --  98   CO2 mmol/L 25.0  --  20.0*   BUN mg/dL 6*  --  10   CREATININE mg/dL 0.71   < > 0.82   CALCIUM mg/dL 8.4*  --  9.7   BILIRUBIN mg/dL  --   --  0.7   ALK PHOS U/L  --   --  77   ALT (SGPT) U/L  --   --  26   AST (SGOT) U/L  --   --  34*   GLUCOSE mg/dL 92  --  194*    < > = values in this interval not displayed.           Results from last 7 days   Lab Units 04/18/24  0714   CHOLESTEROL mg/dL 150   TRIGLYCERIDES mg/dL 134   HDL CHOL mg/dL 56   LDL CHOL mg/dL 71     Results from last 7 days   Lab Units 04/17/24  1333   PROBNP pg/mL 1,423.0             Radiology:  Imaging Results (Last 72 Hours)       Procedure Component Value Units Date/Time    CT Head Without Contrast [[id number]] Collected: 04/17/24 1519     Updated: 04/17/24 1527    Narrative:      CT HEAD WO CONTRAST    Date of Exam: 4/17/2024 2:47 PM EDT    Indication: near syncope.    Comparison: None available.    Technique: Axial CT images were obtained of the head without contrast administration.  Automated exposure control and iterative construction methods were used.      Findings:  There is no evidence of hemorrhage. There is no mass effect or midline shift. Mild diffuse brain atrophy with periventricular hypodensities compatible with chronic small vessel ischemia    There is no extracerebral collection.    Ventricles are normal in size and  configuration for patient's stated age.     Posterior fossa is within normal limits.    Calvarium and skull base appear intact.  Visualized sinuses show no air fluid levels. Visualized orbits are unremarkable.        Impression:      Impression:          Electronically Signed: Vaughn Kelley MD    4/17/2024 3:24 PM EDT    Workstation ID: [id number]    CT Angiogram Chest [[id number]] Collected: 04/17/24 1457     Updated: 04/17/24 1522    Narrative:      CT ANGIOGRAM CHEST, CT ABDOMEN PELVIS W CONTRAST    Date of Exam: 4/17/2024 2:47 PM EDT    Indication: pe.    Comparison: None available.    Technique: CTA of the chest was performed after the uneventful intravenous administration of 90 mL Isovue-370. Reconstructed coronal and sagittal images were also obtained. In addition, a 3-D volume rendered image was created for interpretation.   Automated exposure control and iterative reconstruction methods were used.      Findings:  CT chest: Pulmonary arteries: Adequate opacification of the pulmonary arteries. No evidence of acute pulmonary embolism.    Lungs and Pleura: The lungs are clear. No nodule. No consolidation. No pleural fluid.    Mediastinum/Felipa: No mediastinal or hilar lymphadenopathy.    Lymph nodes: No axillary or supraclavicular adenopathy.    Cardiovascular: The heart appears within normal limits in size with surgical changes. The pericardium is normal. The aorta and its arch branch vessels are unremarkable.      Bones and Soft Tissue: No suspicious osseous lesion.      CT abdomen/pelvis: There is a small hiatal hernia    Liver:     Biliary/Gallbladder: Cholecystectomy changes      Spleen:Spleen is normal in size and CT density.    Pancreas:   Pancreas shows homogeneous density. There is no evidence of pancreatic mass or peripancreatic fluid.      Kidneys: Kidneys are normal in size. There are no stones or hydronephrosis. There is a small simple cortical right renal cyst      Adrenals: Adrenal glands are  unremarkable.      Retroperitoneal/Lymph Nodes/Vasculature: No retroperitoneal adenopathy is identified by size criteria.      Gastrointestinal/Mesentery: The bowel loops are non-dilated. There is mild diffuse thickening of the colon wall. The colon is decompressed limiting the assessment. The appendix appears within normal limits. No evidence of obstruction. No free air.      Bladder: The bladder is unremarkable    No acute abnormalities in the pelvis        Bony Structures:  Visualized bony structures are consistent with the patient's age.        Impression:      Impression:    1. No evidence of pulmonary embolism    2. No acute pulmonary process    3. Small hiatal hernia and hepatic steatosis    4. Mild diffuse colonic wall thickening suspicious for colitis. Clinical correlation recommended      Electronically Signed: Vaughn Kelley MD    4/17/2024 3:19 PM EDT    Workstation ID: [id number]    CT Abdomen Pelvis With Contrast [[id number]] Collected: 04/17/24 1457     Updated: 04/17/24 1522    Narrative:      CT ANGIOGRAM CHEST, CT ABDOMEN PELVIS W CONTRAST    Date of Exam: 4/17/2024 2:47 PM EDT    Indication: pe.    Comparison: None available.    Technique: CTA of the chest was performed after the uneventful intravenous administration of 90 mL Isovue-370. Reconstructed coronal and sagittal images were also obtained. In addition, a 3-D volume rendered image was created for interpretation.   Automated exposure control and iterative reconstruction methods were used.      Findings:  CT chest: Pulmonary arteries: Adequate opacification of the pulmonary arteries. No evidence of acute pulmonary embolism.    Lungs and Pleura: The lungs are clear. No nodule. No consolidation. No pleural fluid.    Mediastinum/Felipa: No mediastinal or hilar lymphadenopathy.    Lymph nodes: No axillary or supraclavicular adenopathy.    Cardiovascular: The heart appears within normal limits in size with surgical changes. The pericardium is normal.  The aorta and its arch branch vessels are unremarkable.      Bones and Soft Tissue: No suspicious osseous lesion.      CT abdomen/pelvis: There is a small hiatal hernia    Liver:     Biliary/Gallbladder: Cholecystectomy changes      Spleen:Spleen is normal in size and CT density.    Pancreas:   Pancreas shows homogeneous density. There is no evidence of pancreatic mass or peripancreatic fluid.      Kidneys: Kidneys are normal in size. There are no stones or hydronephrosis. There is a small simple cortical right renal cyst      Adrenals: Adrenal glands are unremarkable.      Retroperitoneal/Lymph Nodes/Vasculature: No retroperitoneal adenopathy is identified by size criteria.      Gastrointestinal/Mesentery: The bowel loops are non-dilated. There is mild diffuse thickening of the colon wall. The colon is decompressed limiting the assessment. The appendix appears within normal limits. No evidence of obstruction. No free air.      Bladder: The bladder is unremarkable    No acute abnormalities in the pelvis        Bony Structures:  Visualized bony structures are consistent with the patient's age.        Impression:      Impression:    1. No evidence of pulmonary embolism    2. No acute pulmonary process    3. Small hiatal hernia and hepatic steatosis    4. Mild diffuse colonic wall thickening suspicious for colitis. Clinical correlation recommended      Electronically Signed: Vaughn Kelley MD    4/17/2024 3:19 PM EDT    Workstation ID: [id number]    XR Chest 1 View [[id number]] Collected: 04/17/24 1358     Updated: 04/17/24 1402    Narrative:      XR CHEST 1 VW    Date of Exam: 4/17/2024 1:57 PM EDT    Indication: SOA triage protocol    Comparison: Chest radiograph 2/15/2020.    Findings:  Sternotomy and CABG. Similar findings of benign healed/calcified granulomatous disease. Cardiomediastinal silhouette is unchanged. No focal consolidation or overt pulmonary edema. No pleural effusion or pneumothorax. Osseous structures  are unchanged.      Impression:      Impression:  No evidence of acute cardiopulmonary disease.       Electronically Signed: Bernardino Lay MD    4/17/2024 1:59 PM EDT    Workstation ID: [id number]              Tele: NSR    Assessment:  -78-year-old CF with known CAD, HTN, HLP presents to [facility] ED with sudden onset nausea vomiting while eating breakfast.  CT abdomen demonstrates diffuse colonic wall thickening suspicious for colitis.  Initial WBC elevated, now WNL.  -Mildly elevated but flat troponin.  No acute EKG changes..  -CAD/remote CABG 2011  -History of CHF, EF 45% by echo 2020 with mild AR, moderate MR/MS  -HTN  -HLP  -Hypothyroidism  -GERD  -Depression          Plan:  -Admitted to hospitalist services for observation  -Serial troponins remain flat, again no acute EKG changes.  Although patient reports chest pain, this was not her presenting symptom.  -Echo and nuclear MPS ordered by admitting team.    -Cardiac PET reviewed; imaging demonstrates infero/latereral scar with leelee infarct which matches previous cardiac infarct/revascularization.  This is a low-intermediate study.  Again, patient did not present with unstable angina or cardiac symptoms.  We would not pursue further ischemic evaluation based on test results.  -Will review murmur.  -Okay for discharge home from our standpoint.  Keep follow up as previously scheduled.              I discussed the patient's findings and my recommendations with the patient, any present family members, and the nursing staff.  Heladio Zee MD saw and examined patient, verified hx and PE, read all radiographic studies, reviewed labs and micro data, and formulated dx, plan for treatment and all medical decision making.      Jemima Pino PA-C, working with Heladio Zee MD  04/18/24 08:22 EDT

## 2024-04-18 NOTE — CASE MANAGEMENT/SOCIAL WORK
Discharge Planning Assessment  [facility]     Patient Name: Alicia Young  MRN: [id number]  Today's Date: 4/18/2024    Admit Date: 4/17/2024    Plan: home   Discharge Needs Assessment       Row Name 04/18/24 0803       Living Environment    People in Home alone    Current Living Arrangements home    Duration at Residence 1 floor    Primary Care Provided by self       Transition Planning    Patient/Family Anticipates Transition to home       Discharge Needs Assessment    Readmission Within the Last 30 Days no previous admission in last 30 days    Equipment Currently Used at Home cane, straight    Concerns to be Addressed basic needs;discharge planning                   Discharge Plan       Row Name 04/18/24 0804       Plan    Plan home    Patient/Family in Agreement with Plan yes    Plan Comments I met with this patient at the bedside. She lives alone in a 1 floor house in [place] [facility]. She is independent with activities of daily living and mobility with the use of a straight cane. She anticipates returning home after this hospitalization, and her daughter can transport. Case management will continue to follow her plan of care and assist with any discharge planning needs.    Final Discharge Disposition Code 01 - home or self-care                  Continued Care and Services - Admitted Since 4/17/2024    No active coordination exists for this encounter.          Demographic Summary       Row Name 04/18/24 0802       General Information    General Information Comments I confirmed that Shamir Morales is Ms Young' PCP and she has [employer] Medicare                   Functional Status       Row Name 04/18/24 0803       Functional Status, IADL    Medications independent    Meal Preparation independent    Housekeeping independent    Laundry independent    Shopping independent                   Psychosocial    No documentation.                  Abuse/Neglect    No documentation.                  Legal    No  documentation.                  Substance Abuse    No documentation.                  Patient Forms    No documentation.                     Randi Woodward RN

## 2024-04-18 NOTE — DISCHARGE SUMMARY
[facility] Medicine Services  DISCHARGE SUMMARY    Patient Name: Alicia Young  : 1945  MRN: [id number]    Date of Admission: 2024  1:03 PM  Date of Discharge:  2024    Primary Care Physician: Shamir Morales MD    Consults       Date and Time Order Name Status Description    2024  8:30 PM Inpatient Cardiology Consult Completed             Hospital Course     Presenting Problem: vomiting    Active Hospital Problems    Diagnosis  POA    HTN (hypertension) [I10]  Yes    Hyperlipidemia [E78.5]  Yes      Resolved Hospital Problems    Diagnosis Date Resolved POA    **Unstable angina [I20.0] 2024 Yes    Nausea & vomiting [R11.2] 2024 Yes          Hospital Course:  Alicia Young is a 78 y.o. female with h/o systolic CHF/EF 45%, VHD, CAD s/p CABG in  with abrupt onset of n/v.  Patient was admitted for further evaluation for concern for possible unstable angina.  Labs are unremarkable.  CT scan with mild diffuse colitis-but pt not having any abd pain or diarrhea.  Overnight her symptoms resolved.  PET stress test scan with low intermediate probability.  Spoke with Dr. Hardin, no need for intervention.  They are okay with her discharging home today.  Patient able to tolerate her lunch without any difficulty.       Discharge Follow Up Recommendations for outpatient labs/diagnostics:   F/u with PCP    Day of Discharge     HPI:   Pt doing well.  No further N/V.  No chest pain, SOB, diarrhea.      Vital Signs:   Temp:  [97.7 °F (36.5 °C)-98.6 °F (37 °C)] 97.7 °F (36.5 °C)  Heart Rate:  [58-71] 66  Resp:  [18-20] 18  BP: (115-180)/(60-89) 146/60      Physical Exam:  Constitutional: No acute distress, awake, alert  HENT: NCAT, mucous membranes moist  Respiratory: Clear to auscultation bilaterally, respiratory effort normal   Cardiovascular: RRR, no murmurs, rubs, or gallops  Gastrointestinal: Positive bowel sounds, soft, nontender,  nondistended  Musculoskeletal: No bilateral ankle edema  Psychiatric: Appropriate affect, cooperative  Neurologic: Oriented x 3, strength symmetric in all extremities, Cranial Nerves grossly intact to confrontation, speech clear  Skin: No rashes      Pertinent  and/or Most Recent Results     LAB RESULTS:      Lab 04/18/24  0714 04/17/24  1333   WBC 5.19 12.38*   HEMOGLOBIN 13.1 14.8   HEMATOCRIT 38.9 44.3   PLATELETS 174 217   NEUTROS ABS  --  9.94*   IMMATURE GRANS (ABS)  --  0.10*   LYMPHS ABS  --  1.40   MONOS ABS  --  0.82   EOS ABS  --  0.08   MCV 97.5* 96.3         Lab 04/18/24  0714 04/17/24  1354 04/17/24  1333   SODIUM 142  --  136   POTASSIUM 4.1  --  3.8   CHLORIDE 110*  --  98   CO2 25.0  --  20.0*   ANION GAP 7.0  --  18.0*   BUN 6*  --  10   CREATININE 0.71 0.70 0.82   EGFR 87.2  --  73.3   GLUCOSE 92  --  194*   CALCIUM 8.4*  --  9.7   IONIZED CALCIUM 1.31  --   --    MAGNESIUM 1.9  --   --    PHOSPHORUS 3.5  --   --    HEMOGLOBIN A1C  --   --  5.60         Lab 04/17/24  1333   TOTAL PROTEIN 7.3   ALBUMIN 4.2   GLOBULIN 3.1   ALT (SGPT) 26   AST (SGOT) 34*   BILIRUBIN 0.7   ALK PHOS 77   LIPASE 40         Lab 04/18/24  0002 04/17/24  2157 04/17/24  1638 04/17/24  1333   PROBNP  --   --   --  1,423.0   HSTROP T 27* 25* 23* 17*         Lab 04/18/24  0714   CHOLESTEROL 150   LDL CHOL 71   HDL CHOL 56   TRIGLYCERIDES 134             Brief Urine Lab Results  (Last result in the past 365 days)        Color   Clarity   Blood   Leuk Est   Nitrite   Protein   CREAT   Urine HCG        04/17/24 1534 Yellow   Clear   Negative   Negative   Negative   Negative                 Microbiology Results (last 10 days)       Procedure Component Value - Date/Time    COVID PRE-OP / PRE-PROCEDURE SCREENING ORDER (NO ISOLATION) - Swab, Nasopharynx [[id number]]  (Normal) Collected: 04/17/24 1348    Lab Status: Final result Specimen: Swab from Nasopharynx Updated: 04/17/24 1443    Narrative:      The following orders were created  for panel order COVID PRE-OP / PRE-PROCEDURE SCREENING ORDER (NO ISOLATION) - Swab, Nasopharynx.  Procedure                               Abnormality         Status                     ---------                               -----------         ------                     COVID-19, FLU A/B, RSV P...[[id number]]  Normal              Final result                 Please view results for these tests on the individual orders.    COVID-19, FLU A/B, RSV PCR 1 HR TAT - Swab, Nasopharynx [[id number]]  (Normal) Collected: 04/17/24 1348    Lab Status: Final result Specimen: Swab from Nasopharynx Updated: 04/17/24 1443     COVID19 Not Detected     Influenza A PCR Not Detected     Influenza B PCR Not Detected     RSV, PCR Not Detected    Narrative:      Fact sheet for providers: https://www.fda.gov/media/477572/download    Fact sheet for patients: https://www.fda.gov/media/216624/download    Test performed by PCR.            Stress Test With Pet Myocardial Perfusion    Result Date: 4/18/2024    The patient denied chest pain, pressure or tightness. She experienced slight SOA after Lexiscan administration. Headache reported prior to exam and slightly worse afterward that was relieved with IV caffeine.   SR with T wave changes noted. PAC and PVCs in pretest.   GI artifact is present.   REST EF = 42% STRESS EF = 43%.   Moderate sized inferolateral rest and stress defect with leelee-infarct ischemia noted   Low/intermediate risk study     CT Head Without Contrast    Result Date: 4/17/2024  CT HEAD WO CONTRAST Date of Exam: 4/17/2024 2:47 PM EDT Indication: near syncope. Comparison: None available. Technique: Axial CT images were obtained of the head without contrast administration.  Automated exposure control and iterative construction methods were used. Findings: There is no evidence of hemorrhage. There is no mass effect or midline shift. Mild diffuse brain atrophy with periventricular hypodensities compatible with chronic small vessel  ischemia There is no extracerebral collection. Ventricles are normal in size and configuration for patient's stated age. Posterior fossa is within normal limits. Calvarium and skull base appear intact.  Visualized sinuses show no air fluid levels. Visualized orbits are unremarkable.     Impression: Electronically Signed: Vaughn Kelley MD  4/17/2024 3:24 PM EDT  Workstation ID: [id number]    CT Angiogram Chest    Result Date: 4/17/2024  CT ANGIOGRAM CHEST, CT ABDOMEN PELVIS W CONTRAST Date of Exam: 4/17/2024 2:47 PM EDT Indication: pe. Comparison: None available. Technique: CTA of the chest was performed after the uneventful intravenous administration of 90 mL Isovue-370. Reconstructed coronal and sagittal images were also obtained. In addition, a 3-D volume rendered image was created for interpretation. Automated exposure control and iterative reconstruction methods were used. Findings: CT chest: Pulmonary arteries: Adequate opacification of the pulmonary arteries. No evidence of acute pulmonary embolism. Lungs and Pleura: The lungs are clear. No nodule. No consolidation. No pleural fluid. Mediastinum/Felipa: No mediastinal or hilar lymphadenopathy. Lymph nodes: No axillary or supraclavicular adenopathy. Cardiovascular: The heart appears within normal limits in size with surgical changes. The pericardium is normal. The aorta and its arch branch vessels are unremarkable.  Bones and Soft Tissue: No suspicious osseous lesion. CT abdomen/pelvis: There is a small hiatal hernia Liver: Biliary/Gallbladder: Cholecystectomy changes Spleen:Spleen is normal in size and CT density. Pancreas: Pancreas shows homogeneous density. There is no evidence of pancreatic mass or peripancreatic fluid. Kidneys: Kidneys are normal in size. There are no stones or hydronephrosis. There is a small simple cortical right renal cyst Adrenals: Adrenal glands are unremarkable. Retroperitoneal/Lymph Nodes/Vasculature: No retroperitoneal adenopathy is  identified by size criteria. Gastrointestinal/Mesentery: The bowel loops are non-dilated. There is mild diffuse thickening of the colon wall. The colon is decompressed limiting the assessment. The appendix appears within normal limits. No evidence of obstruction. No free air. Bladder: The bladder is unremarkable No acute abnormalities in the pelvis   Bony Structures:  Visualized bony structures are consistent with the patient's age.     Impression: 1. No evidence of pulmonary embolism 2. No acute pulmonary process 3. Small hiatal hernia and hepatic steatosis 4. Mild diffuse colonic wall thickening suspicious for colitis. Clinical correlation recommended Electronically Signed: Vaughn Kelley MD  4/17/2024 3:19 PM EDT  Workstation ID: [id number]    CT Abdomen Pelvis With Contrast    Result Date: 4/17/2024  CT ANGIOGRAM CHEST, CT ABDOMEN PELVIS W CONTRAST Date of Exam: 4/17/2024 2:47 PM EDT Indication: pe. Comparison: None available. Technique: CTA of the chest was performed after the uneventful intravenous administration of 90 mL Isovue-370. Reconstructed coronal and sagittal images were also obtained. In addition, a 3-D volume rendered image was created for interpretation. Automated exposure control and iterative reconstruction methods were used. Findings: CT chest: Pulmonary arteries: Adequate opacification of the pulmonary arteries. No evidence of acute pulmonary embolism. Lungs and Pleura: The lungs are clear. No nodule. No consolidation. No pleural fluid. Mediastinum/Felipa: No mediastinal or hilar lymphadenopathy. Lymph nodes: No axillary or supraclavicular adenopathy. Cardiovascular: The heart appears within normal limits in size with surgical changes. The pericardium is normal. The aorta and its arch branch vessels are unremarkable.  Bones and Soft Tissue: No suspicious osseous lesion. CT abdomen/pelvis: There is a small hiatal hernia Liver: Biliary/Gallbladder: Cholecystectomy changes Spleen:Spleen is normal in  size and CT density. Pancreas: Pancreas shows homogeneous density. There is no evidence of pancreatic mass or peripancreatic fluid. Kidneys: Kidneys are normal in size. There are no stones or hydronephrosis. There is a small simple cortical right renal cyst Adrenals: Adrenal glands are unremarkable. Retroperitoneal/Lymph Nodes/Vasculature: No retroperitoneal adenopathy is identified by size criteria. Gastrointestinal/Mesentery: The bowel loops are non-dilated. There is mild diffuse thickening of the colon wall. The colon is decompressed limiting the assessment. The appendix appears within normal limits. No evidence of obstruction. No free air. Bladder: The bladder is unremarkable No acute abnormalities in the pelvis   Bony Structures:  Visualized bony structures are consistent with the patient's age.     Impression: 1. No evidence of pulmonary embolism 2. No acute pulmonary process 3. Small hiatal hernia and hepatic steatosis 4. Mild diffuse colonic wall thickening suspicious for colitis. Clinical correlation recommended Electronically Signed: Vaughn Kelley MD  4/17/2024 3:19 PM EDT  Workstation ID: [id number]    XR Chest 1 View    Result Date: 4/17/2024  XR CHEST 1 VW Date of Exam: 4/17/2024 1:57 PM EDT Indication: SOA triage protocol Comparison: Chest radiograph 2/15/2020. Findings: Sternotomy and CABG. Similar findings of benign healed/calcified granulomatous disease. Cardiomediastinal silhouette is unchanged. No focal consolidation or overt pulmonary edema. No pleural effusion or pneumothorax. Osseous structures are unchanged.     Impression: No evidence of acute cardiopulmonary disease. Electronically Signed: Bernardino Lay MD  4/17/2024 1:59 PM EDT  Workstation ID: [id number]             Results for orders placed during the hospital encounter of 09/29/20    Adult Transthoracic Echo Complete W/ Cont if Necessary Per Protocol    Interpretation Summary  · The left atrial cavity is dilated.  · Mild aortic valve  regurgitation is present.  · Moderate mitral valve regurgitation is present.  · Moderate mitral valve stenosis is present.  · Estimated right ventricular systolic pressure from tricuspid regurgitation is moderately elevated (45-55 mmHg).  · Estimated left ventricular EF = 45% Left ventricular systolic function is low normal.      Plan for Follow-up of Pending Labs/Results:     Discharge Details        Discharge Medications        Continue These Medications        Instructions Start Date   aspirin 81 MG chewable tablet   1 tablet, Oral      carvedilol 3.125 MG tablet  Commonly known as: COREG   3.125 mg, Oral, 2 Times Daily      FLUoxetine 20 MG capsule  Commonly known as: PROzac   20 mg, Oral, Daily      levothyroxine 25 MCG tablet  Commonly known as: SYNTHROID, LEVOTHROID   levothyroxine 25 mcg tablet   TAKE 1 TABLET BY MOUTH EVERY DAY      multivitamin with minerals tablet tablet   1 tablet, Oral, Daily      pantoprazole 40 MG injection  Commonly known as: PROTONIX   40 mg, TAKES PO      Repatha solution prefilled syringe injection  Generic drug: Evolocumab   140 mg, Subcutaneous, Every 30 Days, Resume after follow-up with Dr. Arroyo      spironolactone 25 MG tablet  Commonly known as: ALDACTONE   25 mg, Oral, Daily      SUPER B COMPLEX PO   Oral      VITAMIN C PO   Oral               Allergies   Allergen Reactions    Lipitor [Atorvastatin] Myalgia    Ezetimibe Myalgia    Statins Myalgia         Discharge Disposition:  Home or Self Care    Diet:  Hospital:  Diet Order   Procedures    Diet: Cardiac; Healthy Heart (2-3 Na+); Fluid Consistency: Thin (IDDSI 0)            Activity:  Activity Instructions       Activity as Tolerated              Restrictions or Other Recommendations:  As tolerated       CODE STATUS:    Code Status and Medical Interventions:   Ordered at: 04/17/24 7238     Level Of Support Discussed With:    Patient     Code Status (Patient has no pulse and is not breathing):    CPR (Attempt to  Resuscitate)     Medical Interventions (Patient has pulse or is breathing):    Full Support       No future appointments.    Additional Instructions for the Follow-ups that You Need to Schedule       Discharge Follow-up with PCP   As directed       Currently Documented PCP:    Shamir Morales MD    PCP Phone Number:    [phone]     Follow Up Details: call for hospital follow up appointment in 1-2 weeks                      Mindy Barrera MD  04/18/24      Time Spent on Discharge:  I spent  28  minutes on this discharge activity which included: face-to-face encounter with the patient, reviewing the data in the system, coordination of the care with the nursing staff as well as consultants, documentation, and entering orders.

## 2024-04-18 NOTE — PLAN OF CARE
Goal Outcome Evaluation:  Plan of Care Reviewed With: patient        Progress: improving  Outcome Evaluation: VSS. RA. Alert and oriented x4. No complaints of pain or nausea. Pt tolerating diet. Ambulating with assistance. Pt resting comfortably. No further complaints at this time. Pt to be discharged. Fall precautions in place.

## 2024-04-19 LAB
QT INTERVAL: 458 MS
QTC INTERVAL: 487 MS

## 2024-04-23 LAB
QT INTERVAL: 484 MS
QTC INTERVAL: 495 MS

## (undated) DEVICE — DEV COMP RAD PRELUDESYNC 24CM

## (undated) DEVICE — CVR HNDL LIGHT RIGID

## (undated) DEVICE — CATH DIAG EXPO .045 AL1 5F 100CM

## (undated) DEVICE — MODEL AT P65, P/N 701554-001KIT CONTENTS: HAND CONTROLLER, 3-WAY HIGH-PRESSURE STOPCOCK WITH ROTATING END AND PREMIUM HIGH-PRESSURE TUBING: Brand: ANGIOTOUCH® KIT

## (undated) DEVICE — CMT BONE SIMPLEX/P FULL DOSE 10/PK: Type: IMPLANTABLE DEVICE | Status: NON-FUNCTIONAL

## (undated) DEVICE — DEV CONTRL TISS STRATAFIX SYMM PDS PLUS VIL CT-1 45CM: Type: IMPLANTABLE DEVICE | Site: KNEE | Status: NON-FUNCTIONAL

## (undated) DEVICE — BNDG ELAS W/CLIP 6IN 10YD LF STRL

## (undated) DEVICE — PK KN TOTL 10

## (undated) DEVICE — ANGIOGRAPHIC CATHETER: Brand: EXPO™

## (undated) DEVICE — DRSNG PAD ABD 8X10IN STRL

## (undated) DEVICE — RIMMED SPEED PIN 30MM STERILE

## (undated) DEVICE — CATH DIAG EXPO M/ PK 5F FL4/FR4 PIG

## (undated) DEVICE — MODEL BT2000 P/N 700287-012KIT CONTENTS: MANIFOLD WITH SALINE AND CONTRAST PORTS, SALINE TUBING WITH SPIKE AND HAND SYRINGE, TRANSDUCER: Brand: BT2000 AUTOMATED MANIFOLD KIT

## (undated) DEVICE — SYR LUERLOK 20CC BX/50

## (undated) DEVICE — GLV SURG SENSICARE W/ALOE PF LF 9 STRL

## (undated) DEVICE — STRYKER PERFORMANCE SERIES SAGITTAL BLADE: Brand: STRYKER PERFORMANCE SERIES

## (undated) DEVICE — CANNULA,ADULT,SOFT-TOUCH,7'TUBE,UC: Brand: PENDING

## (undated) DEVICE — ANTIBACTERIAL UNDYED BRAIDED (POLYGLACTIN 910), SYNTHETIC ABSORBABLE SUTURE: Brand: COATED VICRYL

## (undated) DEVICE — SYS SKIN CLS DERMABOND PRINEO W/22CM MESH TP

## (undated) DEVICE — NON RIMMED SPEED PIN 65MM STERILE

## (undated) DEVICE — PUMP PAIN AUTOFUSER AUTO SELCT NOBOLUS 1TO14ML/HR 550ML DISP

## (undated) DEVICE — RADIFOCUS GLIDEWIRE: Brand: GLIDEWIRE

## (undated) DEVICE — UNDERCAST PADDING: Brand: DEROYAL

## (undated) DEVICE — CATH DIAG EXPO .045 FL3  5F 100CM

## (undated) DEVICE — CEMENT MIXING SYSTEM WITH MIS FEMORAL BREAKAWAY NOZZLE: Brand: REVOLUTION

## (undated) DEVICE — PK CATH CARD 10

## (undated) DEVICE — NDL HYPO ECLPS SFTY 18G 1 1/2IN

## (undated) DEVICE — GLV SURG PREMIERPRO MIC LTX PF SZ8.5 BRN

## (undated) DEVICE — DEV CONTRL TISS STRATAFIX SPIRAL MNCRYL UD 3/0 PLS 60CM: Type: IMPLANTABLE DEVICE | Site: KNEE | Status: NON-FUNCTIONAL

## (undated) DEVICE — GW INQWIRE FC PTFE STD J/1.5 .035 260

## (undated) DEVICE — Device

## (undated) DEVICE — INTRO SHEATH PRELUDE IDEAL SPRNG COIL 021 6F 23X80CM